# Patient Record
Sex: FEMALE | Race: WHITE | Employment: UNEMPLOYED
[De-identification: names, ages, dates, MRNs, and addresses within clinical notes are randomized per-mention and may not be internally consistent; named-entity substitution may affect disease eponyms.]

---
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
## 2022-01-10 ENCOUNTER — OFFICE VISIT (OUTPATIENT)
Dept: ORTHOPEDIC SURGERY | Age: 22
End: 2022-01-10

## 2022-01-10 VITALS — HEIGHT: 62 IN | BODY MASS INDEX: 23 KG/M2 | WEIGHT: 125 LBS | RESPIRATION RATE: 12 BRPM

## 2022-01-10 DIAGNOSIS — S92.901D CLOSED FRACTURE OF RIGHT FOOT WITH ROUTINE HEALING: ICD-10-CM

## 2022-01-10 DIAGNOSIS — S92.301D MULTIPLE CLOSED FRACTURES OF METATARSAL BONE OF RIGHT FOOT WITH ROUTINE HEALING, SUBSEQUENT ENCOUNTER: Primary | ICD-10-CM

## 2022-01-10 DIAGNOSIS — S92.902D CLOSED FRACTURE OF LEFT FOOT WITH ROUTINE HEALING: ICD-10-CM

## 2022-01-10 DIAGNOSIS — Z91.199 NONCOMPLIANCE: ICD-10-CM

## 2022-01-10 PROCEDURE — 99024 POSTOP FOLLOW-UP VISIT: CPT | Performed by: ORTHOPAEDIC SURGERY

## 2022-01-10 NOTE — PROGRESS NOTES
[facility]  [id number] [address] [place] [address] [address] [address]  Dept: [phone]    Ambulatory Orthopedic Postoperative Visit     Preoperative Diagnosis:   1. Left displaced intra-articular calcaneus fracture  2. Right midfoot/Lisfranc injury (fracture of the base of the second through fourth metatarsals, avulsion fracture of the medial cuneiform, bone contusions of the first metatarsal base and medial cuneiform)  3. Body mass index is 22.86 kg/m².     Postoperative Diagnosis:   1. Same as above     Procedures Performed:  (12/16/2021)  1. Left calcaneus fracture open reduction internal fixation, using small incision technique  2. Right foot open treatment of tarsometatarsal dislocation with internal fixation (first and second TMT joint)  3. Right foot open treatment of metatarsal fractures (second metatarsal fracture)  4. Right foot open treatment of tarsal dislocation (intercuneiform joint)  5. Right foot stress x-rays under fluoroscopy      SUBJECTIVE:     The patient returns post op from the above stated procedure. Reports doing well overall, reports improved pain, denies wound drainage/issues, fevers/chills/night sweats, calf swelling/pain, chest pain, shortness of breath.           OBJECTIVE:  Resp 12   Ht 5' 2\" (1.575 m)   Wt 125 lb (56.7 kg)   LMP  (LMP Unknown)   BMI 22.86 kg/m²    NAD, resting comfortably  RLE:   Incisions clean/dry/intact, no erythema/dehiscence/drainage  Sensation to light touch grossly intact throughout  Warm and well perfused  Grossly neurovascularly intact distally  No signs of infection  No calf swelling/tenderness      LLE:   Incisions clean/dry/intact, no erythema/dehiscence/drainage  Sensation to light touch grossly intact throughout  Warm and well perfused  Grossly neurovascularly intact distally  No signs of infection  No calf swelling/tenderness      RADIOLOGY: 1/10/2022 No new radiology images today. Prior images reviewed for reference. ASSESSMENT AND PLAN:     4 weeks s/p above, doing well overall. Her postoperative course has been complicated by not adhering to the recommended DVT prophylaxis regimen. She has a history of a left displaced intra-articular calcaneus fracture, and right midfoot injury with findings concerning for a Lisfranc injury (fractures at the base of the second through fourth metatarsals, avulsion fracture of the medial cuneiform, bony contusions of the first metatarsal base and medial cuneiform), sustained on 12/9/2021.      Notably, she has a complex past medical history.  She has a history of acute psychosis and a jump from a height. [x]  Sutures/staples were removed and steri-strips applied          Precautions:  nonweight bearing x 10 weeks anticipated on the Bilateral lower extremity             -             [x]? Physical Therapy/Home exercises    --nonweightbearing plus range of motion, we discussed that she will remove her cam boots 2-3 times per day for gentle range of motion     Immobilization:      []? Splint/Cast               [x]? CAM boot    --we discussed that she will sleep in the boots bilaterally                []?  Comfortable shoe    []? Other:                DVT ppx:   [x]? Early mobilization              [x]? Medication as prescribed (Eliquis)        --6 weeks of Eliquis 2.5 mg p.o. q. twice daily ordered today; we again discussed the risks of DVT/pulmonary embolus, including the fact that this can be fatal, and understanding was expressed by the patient (2 family members present as well)             []?  No chemical ppx needed; mechanical only             -     Pain control:  Medication as prescribed (dosing and quantity) indicated for acute postoperative pain control             -     Special concerns:       []?            [x]?   Avoid strenuous activity/pain provoking maneuvers and high-impact repetitive exercises      All questions were answered and the patient agrees with the above plan. The patient will return to clinic in 2 weeks with left calcaneus x-rays and right foot x-rays, simulated weightbearing bilaterally         No follow-ups on file. No orders of the defined types were placed in this encounter. No orders of the defined types were placed in this encounter. Danny Cleary MD  Orthopedic Surgery        Please excuse any typos/errors, as this note was created with the assistance of voice recognition software. While intending to generate a document that actually reflects the content of the visit, the document can still have some errors including those of syntax and sound-a-like substitutions which may escape proof reading. In such instances, actual meaning can be extrapolated by context.

## 2022-01-26 DIAGNOSIS — S92.902D CLOSED FRACTURE OF LEFT FOOT WITH ROUTINE HEALING: ICD-10-CM

## 2022-01-26 DIAGNOSIS — S92.301D MULTIPLE CLOSED FRACTURES OF METATARSAL BONE OF RIGHT FOOT WITH ROUTINE HEALING, SUBSEQUENT ENCOUNTER: Primary | ICD-10-CM

## 2022-02-07 ENCOUNTER — OFFICE VISIT (OUTPATIENT)
Dept: ORTHOPEDIC SURGERY | Age: 22
End: 2022-02-07

## 2022-02-07 VITALS — WEIGHT: 125 LBS | BODY MASS INDEX: 23 KG/M2 | HEIGHT: 62 IN | RESPIRATION RATE: 12 BRPM

## 2022-02-07 DIAGNOSIS — S92.301D MULTIPLE CLOSED FRACTURES OF METATARSAL BONE OF RIGHT FOOT WITH ROUTINE HEALING, SUBSEQUENT ENCOUNTER: ICD-10-CM

## 2022-02-07 DIAGNOSIS — S92.301D MULTIPLE CLOSED FRACTURES OF METATARSAL BONE OF RIGHT FOOT WITH ROUTINE HEALING, SUBSEQUENT ENCOUNTER: Primary | ICD-10-CM

## 2022-02-07 DIAGNOSIS — S92.902D CLOSED FRACTURE OF LEFT FOOT WITH ROUTINE HEALING: Primary | ICD-10-CM

## 2022-02-07 DIAGNOSIS — S92.901D CLOSED FRACTURE OF RIGHT FOOT WITH ROUTINE HEALING: ICD-10-CM

## 2022-02-07 PROCEDURE — 99024 POSTOP FOLLOW-UP VISIT: CPT | Performed by: ORTHOPAEDIC SURGERY

## 2022-02-07 NOTE — PROGRESS NOTES
tenderness      RADIOLOGY:   2/7/2022 FINDINGS:  Three views (AP, Oblique, Lateral) of the right foot and two views (Axial and Lateral) of the left calcaneus were obtained in the office today and reviewed, revealing well-seated intact hardware across the left calcaneus fracture, and well-seated intact hardware across the right midfoot. Interval healing without interval displacement. Limited studies. IMPRESSION: Status post left calcaneus fracture ORIF and right midfoot ORIF. Electronically signed by Andre Foster MD      ASSESSMENT AND PLAN:     7.5 weeks s/p above, doing well overall. Her postoperative course has been complicated by not adhering to the recommended DVT prophylaxis regimen. She has a history of a left displaced intra-articular calcaneus fracture, and right midfoot injury with findings concerning for a Lisfranc injury (fractures at the base of the second through fourth metatarsals, avulsion fracture of the medial cuneiform, bony contusions of the first metatarsal base and medial cuneiform), sustained on 12/9/2021.      Notably, she has a complex past medical history.  She has a history of acute psychosis and a jump from a height. Precautions:  nonweight bearing x 10 weeks anticipated on the Bilateral lower extremity             -We will advance the patient's weightbearing status on 2/24/2022, and the importance of strictly adhering to the recommendations was highlighted. The patient will begin weightbearing at 50% partial weightbearing for 2 weeks, progress to 75% partial weightbearing for 2 weeks, then progress to weightbearing as tolerated for 2 weeks. Weightbearing will only be performed while protected in the CAM boot. An educational handout was also provided explaining/reinforcing these details. [x]? Physical Therapy/Home exercises         Immobilization:      []? Splint/Cast               [x]?   CAM boot                 []?  Comfortable shoe []?  Other:                DVT ppx:   [x]? Early mobilization              [x]? Medication as prescribed (Eliquis)        --5 more weeks of Eliquis 2.5 mg p.o. q. twice daily ordered today; we have discussed the risks of DVT/pulmonary embolus     []? No chemical ppx needed; mechanical only             -     Pain control:  Medication as prescribed (dosing and quantity) indicated for acute postoperative pain control             -     Special concerns:       []?            [x]? Avoid strenuous activity/pain provoking maneuvers and high-impact repetitive exercises      All questions were answered and the patient agrees with the above plan. The patient will return to clinic in 4.5 weeks with repeat right foot and left calcaneus x-rays, simulated weightbearing         No follow-ups on file. No orders of the defined types were placed in this encounter. No orders of the defined types were placed in this encounter. Manda Theodore MD  Orthopedic Surgery        Please excuse any typos/errors, as this note was created with the assistance of voice recognition software. While intending to generate a document that actually reflects the content of the visit, the document can still have some errors including those of syntax and sound-a-like substitutions which may escape proof reading. In such instances, actual meaning can be extrapolated by context.

## 2022-03-10 DIAGNOSIS — S92.902D CLOSED FRACTURE OF LEFT FOOT WITH ROUTINE HEALING: ICD-10-CM

## 2022-03-10 DIAGNOSIS — S92.301D MULTIPLE CLOSED FRACTURES OF METATARSAL BONE OF RIGHT FOOT WITH ROUTINE HEALING, SUBSEQUENT ENCOUNTER: Primary | ICD-10-CM

## 2022-03-14 ENCOUNTER — OFFICE VISIT (OUTPATIENT)
Dept: ORTHOPEDIC SURGERY | Age: 22
End: 2022-03-14

## 2022-03-14 VITALS — BODY MASS INDEX: 23 KG/M2 | HEIGHT: 62 IN | WEIGHT: 125 LBS

## 2022-03-14 DIAGNOSIS — Z91.199 NONCOMPLIANCE: ICD-10-CM

## 2022-03-14 DIAGNOSIS — S92.301D MULTIPLE CLOSED FRACTURES OF METATARSAL BONE OF RIGHT FOOT WITH ROUTINE HEALING, SUBSEQUENT ENCOUNTER: Primary | ICD-10-CM

## 2022-03-14 PROCEDURE — 99024 POSTOP FOLLOW-UP VISIT: CPT | Performed by: ORTHOPAEDIC SURGERY

## 2022-03-14 NOTE — PROGRESS NOTES
[place] [address]  Dept: [phone]    Ambulatory Orthopedic Postoperative Visit     Preoperative Diagnosis:   1. Left displaced intra-articular calcaneus fracture  2. Right midfoot/Lisfranc injury (fracture of the base of the second through fourth metatarsals, avulsion fracture of the medial cuneiform, bone contusions of the first metatarsal base and medial cuneiform)  3. Body mass index is 22.86 kg/m².     Postoperative Diagnosis:   1. Same as above     Procedures Performed:  (12/16/2021)  1. Left calcaneus fracture open reduction internal fixation, using small incision technique  2. Right foot open treatment of tarsometatarsal dislocation with internal fixation (first and second TMT joint)  3. Right foot open treatment of metatarsal fractures (second metatarsal fracture)  4. Right foot open treatment of tarsal dislocation (intercuneiform joint)  5. Right foot stress x-rays under fluoroscopy      SUBJECTIVE:     The patient returns post op from the above stated procedure. Reports doing well overall, reports improved pain, denies wound drainage/issues, fevers/chills/night sweats, calf swelling/pain, chest pain, shortness of breath. Notably, a member of the office staff is present for the entire patient interaction, Rupert Castellani, [place]. She is here today with 2 family members, and they report that she has been fully weightbearing without her boots.        OBJECTIVE:  Ht 5' 2\" (1.575 m)   Wt 125 lb (56.7 kg)   HC 16 cm (6.3\")   BMI 22.86 kg/m²    NAD, resting comfortably  RLE:   Incisions clean/dry/intact, no erythema/dehiscence/drainage  Sensation to light touch grossly intact throughout  Warm and well perfused  Grossly neurovascularly intact distally  No signs of infection  No calf swelling/tenderness  No significant periincisional tenderness      LLE:   Incisions clean/dry/intact, no erythema/dehiscence/drainage  Sensation to light touch grossly intact throughout  Warm and well perfused  Grossly neurovascularly intact distally  No signs of infection  No calf swelling/tenderness  No significant periincisional tenderness  -Good subtalar joint range of motion, painless  -Prominence of the posterior aspect of the left heel, possibly related to hardware, minimal tenderness      RADIOLOGY:   3/14/2022 FINDINGS:  Three views (AP, Oblique, Lateral) of the right foot and two views (Axial and Lateral) of the left calcaneus were obtained in the office today and reviewed, revealing well-seated intact hardware across the left calcaneus fracture, and well-seated intact hardware across the right midfoot. Interval healing without interval displacement. Limited studies. IMPRESSION: Status post left calcaneus fracture ORIF and right midfoot ORIF. Electronically signed by Anna Galan MD      ASSESSMENT AND PLAN:     12 weeks s/p above, doing well overall. Her postoperative course has been complicated by not adhering to the recommended DVT prophylaxis regimen, as well as not strictly adhering to the weightbearing precaution. The patient has not been adhering to the recommended weightbearing restriction, and we have discussed this, as well as the importance of this. I have encouraged the patient to closely follow the recommendations. We discussed the risks of arthritis, and future surgery. She has a history of a left displaced intra-articular calcaneus fracture, and right midfoot injury with findings concerning for a Lisfranc injury (fractures at the base of the second through fourth metatarsals, avulsion fracture of the medial cuneiform, bony contusions of the first metatarsal base and medial cuneiform), sustained on 12/9/2021.      Notably, she has a complex past medical history.  She has a history of acute psychosis and a jump from a height.             Precautions:     -Advancing the patient's weightbearing status (begin/began on 2/24/2022), and the importance of strictly adhering to the recommendations has been highlighted. The patient will begin weightbearing at 50% partial weightbearing for 2 weeks, progress to 75% partial weightbearing for 2 weeks, then progress to weightbearing as tolerated for 2 weeks if comfortable. Weightbearing will only be performed while protected in the CAM boot. After this 6-week progression has been performed, the patient may then wean out of the cam boot over the next 2 weeks as tolerated, unless instructed otherwise. Another educational handout was also provided explaining/reinforcing these details. [x]? Physical Therapy/Home exercises         Immobilization:      []? Splint/Cast               [x]? CAM boot                 []?  Comfortable shoe    []? Other:                DVT ppx:   [x]? Early mobilization              []?  Medication as prescribed (Eliquis)           [x]? No chemical ppx needed; mechanical only             -     Pain control:  Medication as prescribed (dosing and quantity) indicated for acute postoperative pain control             -     Special concerns:       []?            [x]? Avoid strenuous activity/pain provoking maneuvers and high-impact repetitive exercises      All questions were answered and the patient agrees with the above plan. The patient will return to clinic in 3 months with repeat right foot and left calcaneus x-rays         No follow-ups on file. No orders of the defined types were placed in this encounter. No orders of the defined types were placed in this encounter. Juvenal Garcia MD  Orthopedic Surgery        Please excuse any typos/errors, as this note was created with the assistance of voice recognition software.  While intending to generate a document that actually reflects the content of the visit, the document can still have some errors including those of syntax and sound-a-like substitutions which may escape proof reading. In such instances, actual meaning can be extrapolated by context.

## 2022-03-29 ENCOUNTER — NURSE TRIAGE (OUTPATIENT)
Dept: OTHER | Facility: CLINIC | Age: 22
End: 2022-03-29

## 2022-03-29 ENCOUNTER — APPOINTMENT (OUTPATIENT)
Dept: GENERAL RADIOLOGY | Age: 22
End: 2022-03-29
Payer: COMMERCIAL

## 2022-03-29 ENCOUNTER — HOSPITAL ENCOUNTER (EMERGENCY)
Age: 22
Discharge: HOME | End: 2022-03-29
Attending: EMERGENCY MEDICINE
Payer: COMMERCIAL

## 2022-03-29 VITALS
OXYGEN SATURATION: 95 % | WEIGHT: 120 LBS | RESPIRATION RATE: 18 BRPM | BODY MASS INDEX: 22.08 KG/M2 | DIASTOLIC BLOOD PRESSURE: 66 MMHG | TEMPERATURE: 99.7 F | HEIGHT: 62 IN | SYSTOLIC BLOOD PRESSURE: 126 MMHG | HEART RATE: 112 BPM

## 2022-03-29 DIAGNOSIS — R11.2 NAUSEA AND VOMITING, INTRACTABILITY OF VOMITING NOT SPECIFIED, UNSPECIFIED VOMITING TYPE: Primary | ICD-10-CM

## 2022-03-29 LAB
ABSOLUTE EOS #: 0 K/UL (ref 0–0.4)
ABSOLUTE LYMPH #: 2.5 K/UL (ref 1.2–5.2)
ABSOLUTE MONO #: 0.9 K/UL (ref 0.1–1.3)
ALBUMIN SERPL-MCNC: 5.6 G/DL (ref 3.5–5.2)
ALP BLD-CCNC: 83 U/L (ref 35–104)
ALT SERPL-CCNC: 8 U/L (ref 5–33)
ANION GAP SERPL CALCULATED.3IONS-SCNC: 17 MMOL/L (ref 9–17)
AST SERPL-CCNC: 11 U/L
BACTERIA: NORMAL
BASOPHILS # BLD: 1 % (ref 0–2)
BASOPHILS ABSOLUTE: 0.1 K/UL (ref 0–0.2)
BILIRUB SERPL-MCNC: 0.67 MG/DL (ref 0.3–1.2)
BILIRUBIN URINE: NEGATIVE
BUN BLDV-MCNC: 14 MG/DL (ref 6–20)
CALCIUM SERPL-MCNC: 10.1 MG/DL (ref 8.6–10.4)
CASTS UA: NORMAL /LPF
CHLORIDE BLD-SCNC: 96 MMOL/L (ref 98–107)
CO2: 27 MMOL/L (ref 20–31)
COLOR: YELLOW
CREAT SERPL-MCNC: 0.59 MG/DL (ref 0.5–0.9)
D-DIMER QUANTITATIVE: 0.42 MG/L FEU (ref 0–0.59)
EOSINOPHILS RELATIVE PERCENT: 0 % (ref 0–4)
EPITHELIAL CELLS UA: NORMAL /HPF
GFR AFRICAN AMERICAN: >60 ML/MIN
GFR NON-AFRICAN AMERICAN: >60 ML/MIN
GFR SERPL CREATININE-BSD FRML MDRD: ABNORMAL ML/MIN/{1.73_M2}
GLUCOSE BLD-MCNC: 98 MG/DL (ref 70–99)
GLUCOSE URINE: NEGATIVE
HCG QUALITATIVE: NEGATIVE
HCT VFR BLD CALC: 42 % (ref 36–46)
HEMOGLOBIN: 14.3 G/DL (ref 12–16)
KETONES, URINE: ABNORMAL
LACTIC ACID: 1.1 MMOL/L (ref 0.5–2.2)
LEUKOCYTE ESTERASE, URINE: ABNORMAL
LIPASE: 34 U/L (ref 13–60)
LYMPHOCYTES # BLD: 22 % (ref 25–45)
MAGNESIUM: 2.3 MG/DL (ref 1.6–2.6)
MCH RBC QN AUTO: 29 PG (ref 26–34)
MCHC RBC AUTO-ENTMCNC: 34.1 G/DL (ref 31–37)
MCV RBC AUTO: 85 FL (ref 80–100)
MONOCYTES # BLD: 8 % (ref 2–8)
NITRITE, URINE: NEGATIVE
PDW BLD-RTO: 14 % (ref 11.5–14.9)
PH UA: 6 (ref 5–8)
PLATELET # BLD: 348 K/UL (ref 150–450)
PMV BLD AUTO: 7.7 FL (ref 6–12)
POTASSIUM SERPL-SCNC: 3.5 MMOL/L (ref 3.7–5.3)
PROTEIN UA: ABNORMAL
RBC # BLD: 4.94 M/UL (ref 4–5.2)
RBC UA: NORMAL /HPF
SEG NEUTROPHILS: 69 % (ref 34–64)
SEGMENTED NEUTROPHILS ABSOLUTE COUNT: 7.7 K/UL (ref 1.3–9.1)
SODIUM BLD-SCNC: 140 MMOL/L (ref 135–144)
SPECIFIC GRAVITY UA: 1.03 (ref 1–1.03)
TOTAL PROTEIN: 9.1 G/DL (ref 6.4–8.3)
TURBIDITY: CLEAR
URINE HGB: NEGATIVE
UROBILINOGEN, URINE: NORMAL
WBC # BLD: 11.1 K/UL (ref 4.5–13.5)
WBC UA: NORMAL /HPF

## 2022-03-29 PROCEDURE — 84703 CHORIONIC GONADOTROPIN ASSAY: CPT

## 2022-03-29 PROCEDURE — 83605 ASSAY OF LACTIC ACID: CPT

## 2022-03-29 PROCEDURE — 96374 THER/PROPH/DIAG INJ IV PUSH: CPT

## 2022-03-29 PROCEDURE — 36415 COLL VENOUS BLD VENIPUNCTURE: CPT

## 2022-03-29 PROCEDURE — 93005 ELECTROCARDIOGRAM TRACING: CPT | Performed by: STUDENT IN AN ORGANIZED HEALTH CARE EDUCATION/TRAINING PROGRAM

## 2022-03-29 PROCEDURE — 83735 ASSAY OF MAGNESIUM: CPT

## 2022-03-29 PROCEDURE — 83690 ASSAY OF LIPASE: CPT

## 2022-03-29 PROCEDURE — 85379 FIBRIN DEGRADATION QUANT: CPT

## 2022-03-29 PROCEDURE — 80053 COMPREHEN METABOLIC PANEL: CPT

## 2022-03-29 PROCEDURE — 81001 URINALYSIS AUTO W/SCOPE: CPT

## 2022-03-29 PROCEDURE — 71045 X-RAY EXAM CHEST 1 VIEW: CPT

## 2022-03-29 PROCEDURE — 2580000003 HC RX 258: Performed by: STUDENT IN AN ORGANIZED HEALTH CARE EDUCATION/TRAINING PROGRAM

## 2022-03-29 PROCEDURE — 85025 COMPLETE CBC W/AUTO DIFF WBC: CPT

## 2022-03-29 PROCEDURE — 6360000002 HC RX W HCPCS: Performed by: STUDENT IN AN ORGANIZED HEALTH CARE EDUCATION/TRAINING PROGRAM

## 2022-03-29 PROCEDURE — 99284 EMERGENCY DEPT VISIT MOD MDM: CPT

## 2022-03-29 RX ORDER — 0.9 % SODIUM CHLORIDE 0.9 %
1000 INTRAVENOUS SOLUTION INTRAVENOUS ONCE
Status: COMPLETED | OUTPATIENT
Start: 2022-03-29 | End: 2022-03-29

## 2022-03-29 RX ORDER — ONDANSETRON 4 MG/1
4 TABLET, ORALLY DISINTEGRATING ORAL EVERY 8 HOURS PRN
Qty: 10 TABLET | Refills: 0 | Status: SHIPPED | OUTPATIENT
Start: 2022-03-29 | End: 2022-04-08

## 2022-03-29 RX ORDER — ONDANSETRON 2 MG/ML
4 INJECTION INTRAMUSCULAR; INTRAVENOUS ONCE
Status: COMPLETED | OUTPATIENT
Start: 2022-03-29 | End: 2022-03-29

## 2022-03-29 RX ADMIN — SODIUM CHLORIDE 1000 ML: 9 INJECTION, SOLUTION INTRAVENOUS at 16:28

## 2022-03-29 RX ADMIN — ONDANSETRON 4 MG: 2 INJECTION INTRAMUSCULAR; INTRAVENOUS at 16:28

## 2022-03-29 ASSESSMENT — ENCOUNTER SYMPTOMS
NAUSEA: 1
SHORTNESS OF BREATH: 0
TROUBLE SWALLOWING: 0
PHOTOPHOBIA: 0
DIARRHEA: 0
SINUS PAIN: 0
CONSTIPATION: 0
EYE REDNESS: 0
VOMITING: 1
COLOR CHANGE: 0
COUGH: 0
BACK PAIN: 0
ABDOMINAL PAIN: 1
CHEST TIGHTNESS: 0
SINUS PRESSURE: 0
SORE THROAT: 0

## 2022-03-29 ASSESSMENT — PAIN SCALES - GENERAL: PAINLEVEL_OUTOF10: 8

## 2022-03-29 ASSESSMENT — PAIN DESCRIPTION - PAIN TYPE: TYPE: ACUTE PAIN

## 2022-03-29 ASSESSMENT — PAIN - FUNCTIONAL ASSESSMENT: PAIN_FUNCTIONAL_ASSESSMENT: 0-10

## 2022-03-29 ASSESSMENT — PAIN DESCRIPTION - LOCATION: LOCATION: ABDOMEN

## 2022-03-29 NOTE — ED PROVIDER NOTES
[facility] ED  Emergency Department Encounter  Emergency Medicine Resident     Pt Name: Abby Valladares  MRN: [id number]  [place] 2000  Date of evaluation: 3/29/22  PCP:  LYNNE Culver CNP    CHIEF COMPLAINT       Chief Complaint   Patient presents with    Abdominal Pain    Emesis       HISTORY OFPRESENT ILLNESS  (Location/Symptom, Timing/Onset, Context/Setting, Quality, Duration, Modifying Factors,Severity.)      Abby Valladares is a 23 y. o.yo female who presents with complaints of nausea and vomiting. Patient has very flat affect a difficult to obtain history from. She does state that for 2 weeks she has been throwing up and has mild abdominal pain. She states that she constantly feels nauseated nothing makes it better or worse. Patient was seen at urgent care earlier today states they were unable to do anything for her. Patient denies any concerns for pregnancy but is unsure if she is on birth control or not. Patient also states she has chest discomfort, denies any shortness of breath or difficulty breathing. Patient denies any urinary symptoms or vaginal discharge. Of note patient has bilateral walking boots on, states that she broke both of her feet in December    PAST MEDICAL / SURGICAL / SOCIAL / FAMILY HISTORY      has a past medical history of ADHD (attention deficit hyperactivity disorder), Anxiety, and Depression. has a past surgical history that includes Adenoidectomy; Tonsillectomy (2003); and Foot fracture surgery (Bilateral, 12/16/2021). Social History     Socioeconomic History    Marital status: Single     Spouse name: Not on file    Number of children: Not on file    Years of education: Not on file    Highest education level: Not on file   Occupational History    Not on file   Tobacco Use    Smoking status: Former Smoker    Smokeless tobacco: Never Used   Vaping Use    Vaping Use: Never used   Substance and Sexual Activity    Alcohol use:  Yes 2.5 MG TABS tablet Take 1 tablet by mouth 2 times daily 1/10/22 2/21/22  Estephania Roach MD   risperiDONE (RISPERDAL) 0.5 MG tablet Take 5 tablets by mouth 2 times daily 12/22/21   Momo Johnson MD   apixaban (ELIQUIS) 2.5 MG TABS tablet Take 1 tablet by mouth 2 times daily 12/15/21   Robson Alvarenga MD   ibuprofen (ADVIL;MOTRIN) 400 MG tablet Take 1 tablet by mouth every 6 hours as needed (Pain moderate (4-7), Pain severe (8-10)) 12/15/21   Robson Alvarenga MD   traZODone (DESYREL) 50 MG tablet Take 1 tablet by mouth nightly as needed for Sleep 12/15/21   Robson Alvarenga MD       REVIEW OFSYSTEMS    (2-9 systems for level 4, 10 or more for level 5)      Review of Systems   Constitutional: Negative for chills, diaphoresis, fatigue and fever. HENT: Negative for congestion, sinus pressure, sinus pain, sore throat and trouble swallowing. Eyes: Negative for photophobia, redness and visual disturbance. Respiratory: Negative for cough, chest tightness and shortness of breath. Cardiovascular: Positive for chest pain. Negative for palpitations and leg swelling. Gastrointestinal: Positive for abdominal pain, nausea and vomiting. Negative for constipation and diarrhea. Genitourinary: Negative for difficulty urinating, dysuria, flank pain, urgency, vaginal bleeding, vaginal discharge and vaginal pain. Musculoskeletal: Positive for gait problem. Negative for back pain, neck pain and neck stiffness. Skin: Negative for color change, pallor and rash. Neurological: Negative for dizziness, tremors, speech difficulty, weakness, light-headedness and headaches. PHYSICAL EXAM   (up to 7 for level 4, 8 or more forlevel 5)      INITIAL VITALS:   ED Triage Vitals [03/29/22 1506]   BP Temp Temp Source Pulse Resp SpO2 Height Weight   126/66 99.7 °F (37.6 °C) Oral 112 18 95 % 5' 2\" (1.575 m) 120 lb (54.4 kg)       Physical Exam  Constitutional:       General: She is not in acute distress. Appearance: She is not ill-appearing. HENT:      Head: Normocephalic and atraumatic. Right Ear: External ear normal.      Left Ear: External ear normal.      Nose: Nose normal. No rhinorrhea. Eyes:      Extraocular Movements: Extraocular movements intact. Pupils: Pupils are equal, round, and reactive to light. Cardiovascular:      Rate and Rhythm: Regular rhythm. Tachycardia present. Pulses: Normal pulses. Heart sounds: No murmur heard. Pulmonary:      Effort: Pulmonary effort is normal.      Breath sounds: Normal breath sounds. Abdominal:      General: Abdomen is flat. Bowel sounds are normal.      Palpations: Abdomen is soft. There is no shifting dullness or fluid wave. Tenderness: There is generalized abdominal tenderness. There is no guarding or rebound. Negative signs include Carmichael's sign. Musculoskeletal:         General: No swelling. Cervical back: Normal range of motion and neck supple. Comments: Bilateral walking boot in place    Skin:     General: Skin is warm and dry. Neurological:      General: No focal deficit present. Mental Status: She is alert and oriented to person, place, and time. Psychiatric:         Mood and Affect: Affect is flat. Behavior: Behavior is withdrawn.          DIFFERENTIAL  DIAGNOSIS     PLAN (LABS / IMAGING / EKG):  Orders Placed This Encounter   Procedures    XR CHEST PORTABLE    CBC with Auto Differential    Comprehensive Metabolic Panel w/ Reflex to MG    Lipase    Lactic Acid    HCG Qualitative, Serum    Urinalysis with Reflex to Culture    D-Dimer, Quantitative    Magnesium    Microscopic Urinalysis    EKG 12 Lead       MEDICATIONS ORDERED:  Orders Placed This Encounter   Medications    0.9 % sodium chloride bolus    ondansetron (ZOFRAN) injection 4 mg    ondansetron (ZOFRAN ODT) 4 MG disintegrating tablet     Sig: Place 1 tablet under the tongue every 8 hours as needed for Nausea     Dispense: 10 tablet     Refill:  0       DDX: Gastroenteritis, cyclic vomiting, urinary tract infection, pregnancy, psychosomatic, low concern for a PE but will rule out with a D-dimer given bilateral ankle fracture, GERD    Initial MDM/Plan: 23 y.o. female who presents with complaints of nausea and vomiting. Patient has extremely flat affect on exam, difficult to obtain history from. Aside from patient's mood physical exam is unremarkable. Abdomen is soft, subjective tenderness patient does not grimace on deep palpation. We will plan for lab work as well as chest x-ray EKG and a D-dimer. DIAGNOSTIC RESULTS / EMERGENCYDEPARTMENT COURSE / MDM     LABS:  Labs Reviewed   CBC WITH AUTO DIFFERENTIAL - Abnormal; Notable for the following components:       Result Value    Seg Neutrophils 69 (*)     Lymphocytes 22 (*)     All other components within normal limits   COMPREHENSIVE METABOLIC PANEL W/ REFLEX TO MG FOR LOW K - Abnormal; Notable for the following components:    Potassium 3.5 (*)     Chloride 96 (*)     Total Protein 9.1 (*)     Albumin 5.6 (*)     All other components within normal limits   URINALYSIS WITH REFLEX TO CULTURE - Abnormal; Notable for the following components:    Ketones, Urine 4+ (*)     Protein, UA 1+ (*)     Leukocyte Esterase, Urine TRACE (*)     All other components within normal limits   LIPASE   LACTIC ACID   HCG, SERUM, QUALITATIVE   D-DIMER, QUANTITATIVE   MAGNESIUM   MICROSCOPIC URINALYSIS         RADIOLOGY:  XR CHEST PORTABLE    Result Date: 3/29/2022  EXAMINATION: ONE XRAY VIEW OF THE CHEST 3/29/2022 4:03 pm COMPARISON: 12/09/2021 HISTORY: Reason for Exam: chest pain FINDINGS: The lungs are without acute focal process. No effusion or pneumothorax. The cardiomediastinal silhouette is normal.  The osseous structures are intact without acute process. Negative chest.         EKG  Normal sinus rhythm, ventricular rate 79 bpm, .   Normal EKG    All EKG's are interpreted by the Emergency Department Physicianwho either signs or Co-signs this chart in the absence of a cardiologist.    EMERGENCY DEPARTMENT COURSE:  ED Course as of 03/29/22 1837   Tue Mar 29, 2022   1626 EKG unremarkable  [CD]   1640 CXR wnl  [CD]   1704 D-Dimer, Quant: 0.42 [CD]   1704 Labs all wnl including d-dimer and LA [CD]   6663 Awaiting UA  [CD]   1817 Per nursing staff urinalysis was obtained and sent to lab. [CD]      ED Course User Index  [CD] Andrew Aguiar DO          PROCEDURES:  None    CONSULTS:  None    CRITICAL CARE:  Please see attending documentation    FINAL IMPRESSION      1.  Nausea and vomiting, intractability of vomiting not specified, unspecified vomiting type          DISPOSITION / PLAN     DISPOSITION    Discharge home    PATIENT REFERRED TO:  LYNNE Baldwin - CNP  [address] [place] [address]  [phone]    Call in 2 days  For ED follow up    [facility] ED  [place] [address]  [phone]  Go to   If symptoms worsen      DISCHARGE MEDICATIONS:  New Prescriptions    ONDANSETRON (ZOFRAN ODT) 4 MG DISINTEGRATING TABLET    Place 1 tablet under the tongue every 8 hours as needed for Nausea       Andrew Aguiar DO  Emergency Medicine Resident    (Please note that portions of this note were completed with a voice recognition program.Efforts were made to edit the dictations but occasionally words are mis-transcribed.)        Andrew Aguiar DO  Resident  03/29/22 4825

## 2022-03-29 NOTE — ED PROVIDER NOTES
[address] ED  eMERGENCY dEPARTMENT eNCOUnter   Attending Attestation     Pt Name: Naif Lim  MRN: [id number]  [place] 2000  Date of evaluation: 3/29/22    History, EXAM, MDM:    Naif Lim is a 23 y.o. female who presents with Abdominal Pain and Emesis    Pt presenting with c/o n/v about 10x per day. Symptoms for a month. Also having cp. She is in bilateral walking boots from a left calcaneous open reduction internal fixation and right foot fx open reduction internal fixation in December of 2021. EKG shows a sinus rhythm. HR is 70, , QRS 82, , no MIMI, No STD, No TWI, the axis is normal.  cxr shows no acute abnormalities. UA does not appear infected. D.dimer is not elevated, doubt PE. No renal failure. Mild hypokalemia at 3.5. No lipase elevation. No lactic acid elevation. Pt provided symptomatic treatment. Recommended close follow up with her pcp, return to ED if symptoms worsen, and warning precautions provided. Vitals:   Vitals:    03/29/22 1506   BP: 126/66   Pulse: 112   Resp: 18   Temp: 99.7 °F (37.6 °C)   TempSrc: Oral   SpO2: 95%   Weight: 120 lb (54.4 kg)   Height: 5' 2\" (1.575 m)     I performed a history and physical examination of the patient and discussed management with the resident. I reviewed the residents note and agree with the documented findings and plan of care. Any areas of disagreement are noted on the chart. I was personally present for the key portions of any procedures. I have documented in the chart those procedures where I was not present during the key portions. I have personally reviewed all images and agree with the resident's interpretation. I have reviewed the emergency nurses triage note. I agree with the chief complaint, past medical history, past surgical history, allergies, medications, social and family history as documented unless otherwise noted below.  Documentation of the HPI, Physical Exam and Medical Decision Making performed by medical students or scribes is based on my personal performance of the HPI, PE and MDM. For Phys Assistant/ Nurse Practitioner cases/documentation I have had a face to face evaluation of this patient and have completed at least one if not all key elements of the E/M (history, physical exam, and MDM). Additional findings are as noted.     Yessenia Victor MD  Attending Emergency  Physician                Yessenia Victor MD  03/29/22 1806

## 2022-03-29 NOTE — TELEPHONE ENCOUNTER
Received call from Cherie Sena at [facility] with nap- [employer]. Subjective: Caller states \"I have been throwing up non stop for 2 weeks. I have stomach pains and my hands go numb\"     Current Symptoms: vomiting, abdominal pain-all over    Patient was slurring her words and repeating herself. Onset: 2 week ago;    Associated Symptoms: NA    Pain Severity: 9/10; sharp; waxing and waning    Temperature: \"a little bit yeah\"    What has been tried: NA    LMP: 3 months ago Pregnant: No    Recommended disposition: Go to ED Now    Care advice provided, patient verbalizes understanding; denies any other questions or concerns; instructed to call back for any new or worsening symptoms. Patient/caller agrees to proceed to . Emergency Department     Attention Provider: Thank you for allowing me to participate in the care of your patient. The patient was connected to triage in response to information provided to the ECC/PSC. Please do not respond through this encounter as the response is not directed to a shared pool.       Reason for Disposition   Vomiting red blood or black (coffee ground) material    Protocols used: VOMITING-ADULT-[place]

## 2022-03-29 NOTE — ED TRIAGE NOTES
Mode of arrival (squad #, walk in, police, etc) : walk in        Chief complaint(s):abd pain with N/V        Arrival Note (brief scenario, treatment PTA, etc). : onset 1 month, sent by Dr for eval. Noting makes better/worse, pt denies any medication changes. Pt does have walking boots on both feet due to bilat broken feet. C= \"Have you ever felt that you should Cut down on your drinking? \"  no  A= \"Have people Annoyed you by criticizing your drinking? \" no  G= \"Have you ever felt bad or Guilty about your drinking? \"  no  E= \"Have you ever had a drink as an Eye-opener first thing in the morning to steady your nerves or to help a hangover? \"  no     Deferred []      Reason for deferring: na    *If yes to two or more: probable alcohol abuse. *

## 2022-03-30 LAB
EKG ATRIAL RATE: 79 BPM
EKG P AXIS: 19 DEGREES
EKG P-R INTERVAL: 126 MS
EKG Q-T INTERVAL: 382 MS
EKG QRS DURATION: 82 MS
EKG QTC CALCULATION (BAZETT): 438 MS
EKG R AXIS: 57 DEGREES
EKG T AXIS: 51 DEGREES
EKG VENTRICULAR RATE: 79 BPM

## 2022-03-30 PROCEDURE — 93010 ELECTROCARDIOGRAM REPORT: CPT | Performed by: INTERNAL MEDICINE

## 2022-04-05 RX ORDER — RISPERIDONE 2 MG/1
2 TABLET, FILM COATED ORAL 2 TIMES DAILY
Status: ON HOLD | COMMUNITY
Start: 2022-03-01 | End: 2022-04-14 | Stop reason: HOSPADM

## 2022-04-05 RX ORDER — HYDROXYZINE HYDROCHLORIDE 25 MG/1
25 TABLET, FILM COATED ORAL EVERY 8 HOURS PRN
Status: ON HOLD | COMMUNITY
Start: 2022-03-03 | End: 2022-04-14 | Stop reason: HOSPADM

## 2022-04-05 RX ORDER — ESCITALOPRAM OXALATE 5 MG/1
1 TABLET ORAL DAILY
COMMUNITY
Start: 2022-03-01 | End: 2022-04-08

## 2022-04-08 ENCOUNTER — OFFICE VISIT (OUTPATIENT)
Dept: INTERNAL MEDICINE CLINIC | Age: 22
End: 2022-04-08
Payer: COMMERCIAL

## 2022-04-08 ENCOUNTER — HOSPITAL ENCOUNTER (INPATIENT)
Age: 22
LOS: 6 days | Discharge: HOME | DRG: 885 | End: 2022-04-14
Attending: STUDENT IN AN ORGANIZED HEALTH CARE EDUCATION/TRAINING PROGRAM | Admitting: PSYCHIATRY & NEUROLOGY
Payer: COMMERCIAL

## 2022-04-08 VITALS
OXYGEN SATURATION: 98 % | SYSTOLIC BLOOD PRESSURE: 100 MMHG | DIASTOLIC BLOOD PRESSURE: 58 MMHG | HEIGHT: 62 IN | HEART RATE: 84 BPM | BODY MASS INDEX: 24.62 KG/M2 | WEIGHT: 133.8 LBS

## 2022-04-08 DIAGNOSIS — R45.851 SUICIDAL IDEATION: Primary | ICD-10-CM

## 2022-04-08 DIAGNOSIS — T14.91XA SUICIDE ATTEMPT (HCC): Primary | ICD-10-CM

## 2022-04-08 DIAGNOSIS — R11.2 NAUSEA AND VOMITING, INTRACTABILITY OF VOMITING NOT SPECIFIED, UNSPECIFIED VOMITING TYPE: ICD-10-CM

## 2022-04-08 DIAGNOSIS — F19.10 POLYSUBSTANCE ABUSE (HCC): ICD-10-CM

## 2022-04-08 DIAGNOSIS — F41.1 GENERALIZED ANXIETY DISORDER: ICD-10-CM

## 2022-04-08 PROBLEM — F33.3 MAJOR DEPRESSIVE DISORDER, RECURRENT, SEVERE WITH PSYCHOTIC FEATURES (HCC): Status: ACTIVE | Noted: 2022-04-08

## 2022-04-08 LAB
ABSOLUTE EOS #: 0.4 K/UL (ref 0–0.4)
ABSOLUTE LYMPH #: 3.1 K/UL (ref 1.2–5.2)
ABSOLUTE MONO #: 0.7 K/UL (ref 0.1–1.3)
ACETAMINOPHEN LEVEL: <5 UG/ML (ref 10–30)
ALBUMIN SERPL-MCNC: 4.6 G/DL (ref 3.5–5.2)
ALP BLD-CCNC: 78 U/L (ref 35–104)
ALT SERPL-CCNC: 9 U/L (ref 5–33)
ANION GAP SERPL CALCULATED.3IONS-SCNC: 12 MMOL/L (ref 9–17)
AST SERPL-CCNC: 11 U/L
BACTERIA: NORMAL
BASOPHILS # BLD: 1 % (ref 0–2)
BASOPHILS ABSOLUTE: 0.1 K/UL (ref 0–0.2)
BILIRUB SERPL-MCNC: 0.19 MG/DL (ref 0.3–1.2)
BILIRUBIN URINE: NEGATIVE
BUN BLDV-MCNC: 6 MG/DL (ref 6–20)
CALCIUM SERPL-MCNC: 9.2 MG/DL (ref 8.6–10.4)
CASTS UA: NORMAL /LPF
CHLORIDE BLD-SCNC: 101 MMOL/L (ref 98–107)
CO2: 25 MMOL/L (ref 20–31)
COLOR: YELLOW
CREAT SERPL-MCNC: 0.51 MG/DL (ref 0.5–0.9)
EOSINOPHILS RELATIVE PERCENT: 4 % (ref 0–4)
EPITHELIAL CELLS UA: NORMAL /HPF
ETHANOL PERCENT: <0.01 %
ETHANOL: <10 MG/DL
GFR AFRICAN AMERICAN: >60 ML/MIN
GFR NON-AFRICAN AMERICAN: >60 ML/MIN
GFR SERPL CREATININE-BSD FRML MDRD: ABNORMAL ML/MIN/{1.73_M2}
GLUCOSE BLD-MCNC: 74 MG/DL (ref 70–99)
GLUCOSE URINE: NEGATIVE
HCG QUALITATIVE: NEGATIVE
HCT VFR BLD CALC: 38 % (ref 36–46)
HEMOGLOBIN: 12.9 G/DL (ref 12–16)
KETONES, URINE: NEGATIVE
LEUKOCYTE ESTERASE, URINE: NEGATIVE
LIPASE: 23 U/L (ref 13–60)
LYMPHOCYTES # BLD: 35 % (ref 25–45)
MCH RBC QN AUTO: 29.4 PG (ref 26–34)
MCHC RBC AUTO-ENTMCNC: 34 G/DL (ref 31–37)
MCV RBC AUTO: 86.4 FL (ref 80–100)
MONOCYTES # BLD: 8 % (ref 2–8)
NITRITE, URINE: NEGATIVE
PDW BLD-RTO: 13.6 % (ref 11.5–14.9)
PH UA: 7 (ref 5–8)
PLATELET # BLD: 297 K/UL (ref 150–450)
PMV BLD AUTO: 7.4 FL (ref 6–12)
POTASSIUM SERPL-SCNC: 4.1 MMOL/L (ref 3.7–5.3)
PROTEIN UA: NEGATIVE
RBC # BLD: 4.4 M/UL (ref 4–5.2)
RBC UA: NORMAL /HPF
SALICYLATE LEVEL: <1 MG/DL (ref 3–10)
SARS-COV-2, RAPID: NOT DETECTED
SEG NEUTROPHILS: 52 % (ref 34–64)
SEGMENTED NEUTROPHILS ABSOLUTE COUNT: 4.6 K/UL (ref 1.3–9.1)
SODIUM BLD-SCNC: 138 MMOL/L (ref 135–144)
SPECIFIC GRAVITY UA: 1 (ref 1–1.03)
SPECIMEN DESCRIPTION: NORMAL
TOTAL PROTEIN: 7.1 G/DL (ref 6.4–8.3)
TURBIDITY: CLEAR
URINE HGB: NEGATIVE
UROBILINOGEN, URINE: NORMAL
WBC # BLD: 8.8 K/UL (ref 4.5–13.5)
WBC UA: NORMAL /HPF

## 2022-04-08 PROCEDURE — 83690 ASSAY OF LIPASE: CPT

## 2022-04-08 PROCEDURE — 1111F DSCHRG MED/CURRENT MED MERGE: CPT | Performed by: INTERNAL MEDICINE

## 2022-04-08 PROCEDURE — 87635 SARS-COV-2 COVID-19 AMP PRB: CPT

## 2022-04-08 PROCEDURE — 84703 CHORIONIC GONADOTROPIN ASSAY: CPT

## 2022-04-08 PROCEDURE — 99214 OFFICE O/P EST MOD 30 MIN: CPT | Performed by: INTERNAL MEDICINE

## 2022-04-08 PROCEDURE — 6370000000 HC RX 637 (ALT 250 FOR IP): Performed by: PSYCHIATRY & NEUROLOGY

## 2022-04-08 PROCEDURE — 99223 1ST HOSP IP/OBS HIGH 75: CPT | Performed by: PSYCHIATRY & NEUROLOGY

## 2022-04-08 PROCEDURE — 85025 COMPLETE CBC W/AUTO DIFF WBC: CPT

## 2022-04-08 PROCEDURE — 81001 URINALYSIS AUTO W/SCOPE: CPT

## 2022-04-08 PROCEDURE — 99285 EMERGENCY DEPT VISIT HI MDM: CPT

## 2022-04-08 PROCEDURE — 80053 COMPREHEN METABOLIC PANEL: CPT

## 2022-04-08 PROCEDURE — 36415 COLL VENOUS BLD VENIPUNCTURE: CPT

## 2022-04-08 PROCEDURE — 1240000000 HC EMOTIONAL WELLNESS R&B

## 2022-04-08 PROCEDURE — 2580000003 HC RX 258: Performed by: STUDENT IN AN ORGANIZED HEALTH CARE EDUCATION/TRAINING PROGRAM

## 2022-04-08 PROCEDURE — G0480 DRUG TEST DEF 1-7 CLASSES: HCPCS

## 2022-04-08 PROCEDURE — 80179 DRUG ASSAY SALICYLATE: CPT

## 2022-04-08 PROCEDURE — 80143 DRUG ASSAY ACETAMINOPHEN: CPT

## 2022-04-08 PROCEDURE — APPSS60 APP SPLIT SHARED TIME 46-60 MINUTES: Performed by: NURSE PRACTITIONER

## 2022-04-08 RX ORDER — HALOPERIDOL 5 MG/ML
5 INJECTION INTRAMUSCULAR EVERY 6 HOURS PRN
Status: DISCONTINUED | OUTPATIENT
Start: 2022-04-08 | End: 2022-04-14 | Stop reason: HOSPADM

## 2022-04-08 RX ORDER — MAGNESIUM HYDROXIDE/ALUMINUM HYDROXICE/SIMETHICONE 120; 1200; 1200 MG/30ML; MG/30ML; MG/30ML
30 SUSPENSION ORAL EVERY 6 HOURS PRN
Status: DISCONTINUED | OUTPATIENT
Start: 2022-04-08 | End: 2022-04-14 | Stop reason: HOSPADM

## 2022-04-08 RX ORDER — HYDROXYZINE 50 MG/1
50 TABLET, FILM COATED ORAL 3 TIMES DAILY PRN
Status: DISCONTINUED | OUTPATIENT
Start: 2022-04-08 | End: 2022-04-14 | Stop reason: HOSPADM

## 2022-04-08 RX ORDER — DIPHENHYDRAMINE HYDROCHLORIDE 50 MG/ML
50 INJECTION INTRAMUSCULAR; INTRAVENOUS EVERY 6 HOURS PRN
Status: DISCONTINUED | OUTPATIENT
Start: 2022-04-08 | End: 2022-04-14 | Stop reason: HOSPADM

## 2022-04-08 RX ORDER — TRAZODONE HYDROCHLORIDE 50 MG/1
50 TABLET ORAL NIGHTLY PRN
Status: DISCONTINUED | OUTPATIENT
Start: 2022-04-08 | End: 2022-04-14 | Stop reason: HOSPADM

## 2022-04-08 RX ORDER — IBUPROFEN 400 MG/1
400 TABLET ORAL EVERY 6 HOURS PRN
Status: DISCONTINUED | OUTPATIENT
Start: 2022-04-08 | End: 2022-04-14 | Stop reason: HOSPADM

## 2022-04-08 RX ORDER — POLYETHYLENE GLYCOL 3350 17 G/17G
17 POWDER, FOR SOLUTION ORAL DAILY PRN
Status: DISCONTINUED | OUTPATIENT
Start: 2022-04-08 | End: 2022-04-14 | Stop reason: HOSPADM

## 2022-04-08 RX ORDER — LORAZEPAM 2 MG/ML
2 INJECTION INTRAMUSCULAR EVERY 6 HOURS PRN
Status: DISCONTINUED | OUTPATIENT
Start: 2022-04-08 | End: 2022-04-14 | Stop reason: HOSPADM

## 2022-04-08 RX ORDER — ONDANSETRON 2 MG/ML
4 INJECTION INTRAMUSCULAR; INTRAVENOUS ONCE
Status: DISCONTINUED | OUTPATIENT
Start: 2022-04-08 | End: 2022-04-14 | Stop reason: HOSPADM

## 2022-04-08 RX ORDER — RISPERIDONE 2 MG/1
2 TABLET, FILM COATED ORAL 2 TIMES DAILY
Status: DISCONTINUED | OUTPATIENT
Start: 2022-04-08 | End: 2022-04-08

## 2022-04-08 RX ORDER — ACETAMINOPHEN 325 MG/1
650 TABLET ORAL EVERY 6 HOURS PRN
Status: DISCONTINUED | OUTPATIENT
Start: 2022-04-08 | End: 2022-04-14 | Stop reason: HOSPADM

## 2022-04-08 RX ORDER — 0.9 % SODIUM CHLORIDE 0.9 %
1000 INTRAVENOUS SOLUTION INTRAVENOUS ONCE
Status: COMPLETED | OUTPATIENT
Start: 2022-04-08 | End: 2022-04-08

## 2022-04-08 RX ORDER — PALIPERIDONE 3 MG/1
3 TABLET, EXTENDED RELEASE ORAL DAILY
Status: DISCONTINUED | OUTPATIENT
Start: 2022-04-08 | End: 2022-04-14 | Stop reason: HOSPADM

## 2022-04-08 RX ADMIN — PALIPERIDONE 3 MG: 3 TABLET, EXTENDED RELEASE ORAL at 18:39

## 2022-04-08 RX ADMIN — HYDROXYZINE HYDROCHLORIDE 50 MG: 50 TABLET, FILM COATED ORAL at 20:50

## 2022-04-08 RX ADMIN — NICOTINE POLACRILEX 2 MG: 2 GUM, CHEWING BUCCAL at 16:35

## 2022-04-08 RX ADMIN — SODIUM CHLORIDE 1000 ML: 9 INJECTION, SOLUTION INTRAVENOUS at 12:06

## 2022-04-08 RX ADMIN — TRAZODONE HYDROCHLORIDE 50 MG: 50 TABLET ORAL at 20:50

## 2022-04-08 RX ADMIN — HYDROXYZINE HYDROCHLORIDE 50 MG: 50 TABLET, FILM COATED ORAL at 17:15

## 2022-04-08 ASSESSMENT — SLEEP AND FATIGUE QUESTIONNAIRES
DO YOU HAVE DIFFICULTY SLEEPING: NO
DO YOU USE A SLEEP AID: NO
AVERAGE NUMBER OF SLEEP HOURS: 8

## 2022-04-08 ASSESSMENT — ENCOUNTER SYMPTOMS
VOMITING: 1
COLOR CHANGE: 0
NAUSEA: 1
RHINORRHEA: 0
COUGH: 0
PHOTOPHOBIA: 0
FACIAL SWELLING: 0
EYE ITCHING: 0
ABDOMINAL PAIN: 1
SHORTNESS OF BREATH: 0
DIARRHEA: 0

## 2022-04-08 ASSESSMENT — LIFESTYLE VARIABLES
HISTORY_ALCOHOL_USE: NO
HISTORY_ALCOHOL_USE: NO

## 2022-04-08 ASSESSMENT — PAIN SCALES - GENERAL
PAINLEVEL_OUTOF10: 0
PAINLEVEL_OUTOF10: 4

## 2022-04-08 ASSESSMENT — PAIN DESCRIPTION - DESCRIPTORS: DESCRIPTORS: SHOOTING
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
## 2022-04-08 NOTE — PROGRESS NOTES
Medication History completed:    New medications: none    Medications discontinued: trazodone, ondansetron, ibuprofen, escitalopram    Changes to dosing: none    Stated allergies: NKDA    Other pertinent information: Medications confirmed with Tami. The patient reports that she is not taking the escitalopram which was prescribed at the beginning of March.      Thank you,  Annita Funk, PharmD, BCPS  [phone]
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
## 2022-04-08 NOTE — PROGRESS NOTES
Visit Information    Have you changed or started any medications since your last visit including any over-the-counter medicines, vitamins, or herbal medicines? no   Are you having any side effects from any of your medications? -  no  Have you stopped taking any of your medications? Is so, why? -  no    Have you seen any other physician or provider since your last visit? Yes - Records Obtained  Have you had any other diagnostic tests since your last visit? Yes - Records Obtained  Have you been seen in the emergency room and/or had an admission to a hospital since we last saw you? Yes - Records Obtained  Have you had your routine dental cleaning in the past 6 months? no    Have you activated your [employer] account? If not, what are your barriers?  Yes     Patient Care Team:  Yoselin Reilly MD as PCP - General (Internal Medicine)  [facility], APRN - CNP as PCP - [facility] Provider    Medical History Review  Past Medical, Family, and Social History reviewed and does contribute to the patient presenting condition    Health Maintenance   Topic Date Due    Hepatitis C screen  Never done    COVID-19 Vaccine (1) Never done    HPV vaccine (1 - 2-dose series) Never done    HIV screen  Never done    Chlamydia screen  Never done    Depression Monitoring  02/12/2022    Flu vaccine (Season Ended) 09/01/2022    DTaP/Tdap/Td vaccine (7 - Td or Tdap) 11/27/2023    Hepatitis B vaccine  Completed    Hib vaccine  Completed    Varicella vaccine  Completed    Pneumococcal 0-64 years Vaccine  Completed    Hepatitis A vaccine  Aged Out    Meningococcal (ACWY) vaccine  Aged Out

## 2022-04-08 NOTE — BH NOTE
Patient given tobacco quitline number 24625268354 at this time, refusing to call at this time, states \" I just dont want to quit now\"- patient given information as to the dangers of long term tobacco use. Continue to reinforce the importance of tobacco cessation.

## 2022-04-08 NOTE — PROGRESS NOTES
Behavioral Services  Medicare Certification Upon Admission    I certify that this patient's inpatient psychiatric hospital admission is medically necessary for:    [x] (1) Treatment which could reasonably be expected to improve this patient's condition,       [x] (2) Or for diagnostic study;     AND     [x](2) The inpatient psychiatric services are provided while the individual is under the care of a physician and are included in the individualized plan of care.     Estimated length of stay/service 3-9 days    Plan for post-hospital care -outpatient care    Electronically signed by Dylan Narvaez MD on 4/8/2022 at 6:06 PM

## 2022-04-08 NOTE — ED NOTES
Dr Madelin Roberson to admit under acute psychosis and voluntary faxed to [facility]/\[facility]\""

## 2022-04-08 NOTE — BH NOTE
[facility]  Admission Note     Admission Type:   Admission Type: Voluntary    Reason for admission:  Reason for Admission: Acute Psychosis with suicidal plan    PATIENT STRENGTHS:  Strengths: No significant Physical Illness,Positive Support    Patient Strengths and Limitations:  Limitations: Difficult relationships / poor social skills,Difficulty problem solving/relies on others to help solve problems,Inappropriate/potentially harmful leisure interests,Apathetic / unmotivated,Multiple barriers to leisure interests    Addictive Behavior:   Addictive Behavior  In the past 3 months, have you felt or has someone told you that you have a problem with:  : None  Do you have a history of Chemical Use?: No  Do you have a history of Alcohol Use?: No  Do you have a history of Street Drug Abuse?: No  Histroy of Prescripton Drug Abuse?: No    Medical Problems:   Past Medical History:   Diagnosis Date    ADHD (attention deficit hyperactivity disorder)     Anxiety     Depression        Status EXAM:  Status and Exam  Normal: No  Facial Expression: Flat  Affect: Blunt  Level of Consciousness: Alert  Mood:Normal: No  Mood: Depressed,Anxious  Motor Activity:Normal: Yes  Interview Behavior: Cooperative  Preception: [place] to Person,[place] to Time,[place] to Place,[place] to Situation  Attention:Normal: Yes  Thought Content:Normal: Yes  Hallucinations:  Auditory (Comment) (hearing voices saying \"to kill herself\" and that Joann Mullins is worthless)  Delusions: No  Memory:Normal: Yes  Insight and Judgment: No  Insight and Judgment: Poor Judgment,Poor Insight  Present Suicidal Ideation: Yes  Present Homicidal Ideation: No    Tobacco Screening:  Practical Counseling, on admission, jonatan X, if applicable and completed (first 3 are required if patient doesn't refuse):            ( )  Recognizing danger situations (included triggers and roadblocks)                    ( )  Coping skills (new ways to manage stress, exercise, relaxation techniques, changing routine, distraction)                                                           ( )  Basic information about quitting (benefits of quitting, techniques in how to quit, available resources  ( ) Referral for counseling faxed to [facility]                    ( x) Patient refused counseling  ( ) Patient has not smoked in the last 30 days    Metabolic Screening:    No results found for: LABA1C    No results found for: CHOL  No results found for: TRIG  No results found for: HDL  No components found for: LDLCAL  No results found for: LABVLDL      There is no height or weight on file to calculate BMI. BP Readings from Last 2 Encounters:   04/08/22 104/65   04/08/22 (!) 100/58           Pt admitted with followings belongings:  Dental Appliances: None  Vision - Corrective Lenses: None  Hearing Aid: None  Jewelry: None  Body Piercings Removed: N/A  Clothing: Jacket / coat,Pants,Shirt,Undergarments (Comment) (1 pair underwear)  Were All Patient Medications Collected?: Not Applicable  Other Valuables: Purse,Other (Comment),Money (Comment) (two visa cards, $7.85, [place] ID)     Patient's home medications were verified. Patient oriented to surroundings and program expectations and copy of patient rights given. Received admission packet:  yes. Consents reviewed, signed yes. Refused : no. Patient verbalize understanding:  yes.   Patient education on precautions: yes                   Wm Mason RN
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
## 2022-04-09 PROCEDURE — 1240000000 HC EMOTIONAL WELLNESS R&B

## 2022-04-09 PROCEDURE — 6370000000 HC RX 637 (ALT 250 FOR IP): Performed by: PSYCHIATRY & NEUROLOGY

## 2022-04-09 PROCEDURE — 99232 SBSQ HOSP IP/OBS MODERATE 35: CPT | Performed by: NURSE PRACTITIONER

## 2022-04-09 RX ADMIN — NICOTINE POLACRILEX 2 MG: 2 GUM, CHEWING BUCCAL at 11:28

## 2022-04-09 RX ADMIN — PALIPERIDONE 3 MG: 3 TABLET, EXTENDED RELEASE ORAL at 08:11

## 2022-04-09 RX ADMIN — HYDROXYZINE HYDROCHLORIDE 50 MG: 50 TABLET, FILM COATED ORAL at 11:28

## 2022-04-09 NOTE — BH NOTE
LOS:  PT resting in her bed will continue to monitor. SURGICAL AREA AND NEEDED MORE SURGERY TO GET THEM REMOVED    Kidney stone     RIGHT KIDNEY    Lumbar herniated disc 1989    Microhematuria     Obesity     GUTIERREZ on CPAP     Pancreatitis     Pneumonia     Tinnitus     Use of cane as ambulatory aid     Uses brace     BACK BRACE AND LEFT HAND    Wears glasses     READING        Past Surgical History:     Past Surgical History:   Procedure Laterality Date    ABDOMEN SURGERY  11/18/2011    RIGHT COLECTOMY    APPENDECTOMY  2011    with colectomy    BACK SURGERY  2014    LUMBAR LAMINECTOMY    BACK SURGERY  03/01/2014    EVACUATION OF SPINAL HEMATOMA    CATARACT REMOVAL Bilateral 01/2018    CATARACTS WITH IOL PLACED    COLONOSCOPY      Removal of colon polyp    CORONARY ARTERY BYPASS GRAFT  09/28/2015    CABG BYPASS X 3    CYSTOSCOPY Right 08/23/2018    CYSTOSCOPY, INSERTION OCCLUSIVE BALLOON, RIGHT RETROGRADE PYELOGRAM     KNEE SURGERY Right 2007    TENDON REPAIR    NEPHROSTOMY Right 08/23/2018    HOLMIUM - NEPHROLITHOTOMY PERCUTANEOUS    NERVE BLOCK      STEROID INJECTIONS STARTING 2015 - 04/2018    LA CYSTOURETHROSCOPY Right 8/23/2018    CYSTOSCOPY, INSERTION OCCLUSIVE BALLOON, RIGHT RETROGRADE PYELOGRAM performed by Vitor Isaac MD at [facility] 89 TO 2 CM Right 8/23/2018    HOLMIUM - NEPHROLITHOTOMY PERCUTANEOUS, C-ARM, LITHOCLAST,  PT COMING FROM INTERV RADIOLOGY  *SHORT STAY performed by Vitor Isaac MD at [address] [place] [address]        Medications Prior to Admission:     Prior to Admission medications    Medication Sig Start Date End Date Taking?  Authorizing Provider   amLODIPine (NORVASC) 5 MG tablet TAKE 1 TABLET DAILY 7/24/18  Yes Quin Cordova MD   metoprolol succinate (TOPROL XL) 50 MG extended release tablet TAKE 1 TABLET DAILY 7/24/18  Yes Quin Cordova MD   spironolactone (ALDACTONE) 25 MG tablet TAKE 1 TABLET DAILY 7/24/18  Yes Quin Cordova MD montelukast (SINGULAIR) 10 MG tablet TAKE 1 TABLET DAILY 7/24/18  Yes Kelly Junior MD   atorvastatin (LIPITOR) 40 MG tablet TAKE 1 TABLET DAILY 7/24/18  Yes Kelly Junior MD   omeprazole (PRILOSEC) 40 MG delayed release capsule TAKE 1 CAPSULE DAILY 7/24/18  Yes Kelly Junior MD   losartan (COZAAR) 100 MG tablet TAKE 1 TABLET DAILY 7/24/18  Yes Kelly Junior MD   metFORMIN (GLUCOPHAGE) 500 MG tablet Take 2 tablets by mouth 2 times daily 1/29/18  Yes Kelly Junior MD   insulin lispro prot & lispro (HUMALOG MIX 50/50 KWIKPEN) (50-50) 100 UNIT per ML SUPN injection pen 90 u in am,60 at 6pm, 30 night  Patient taking differently: Inject into the skin 3 times daily 90 u in am,60u at 6pm, 30u night 1/29/18  Yes Kelly Junior MD   Respiratory Therapy Supplies MISC Cpap supplies- tubing, mask, filter 1/29/18  Yes Kelly Junior MD   Elastic Bandages & Supports (WRIST BRACE ULTRA-LITE) MISC Use as directed 1/29/18  Yes Kelly Junior MD   sildenafil (VIAGRA) 100 MG tablet Take 1 tablet by mouth as needed for Erectile Dysfunction 5/31/17  Yes Kelly Junior MD   diclofenac (VOLTAREN) 75 MG EC tablet TAKE 1 TABLET TWICE A DAY  Patient taking differently: TAKE 1 TABLET TWICE A DAY. PATIENT TO STOP TAKING 08/17/2018 FOR SURGERY. 7/24/18   Kelly Junior MD   Naproxen Sodium (ALEVE) 220 MG CAPS Take 2 capsules by mouth daily PATIENT TO STOP TAKING 08/17/2018 FOR SURGERY. Historical Provider, MD   aspirin 81 MG tablet Take 81 mg by mouth daily PATIENT TO STOP TAKING 08/17/2018 FOR SURGERY. Historical Provider, MD      Allergies:     Patient has no known allergies. Social History:     Tobacco:    reports that he has never smoked. He has never used smokeless tobacco.  Alcohol:      reports that he does not drink alcohol. Drug Use:  reports that he does not use drugs.     Family History:     Family History   Problem Relation Age of Onset    Diabetes Father     High Blood distress  Mental status: oriented to person, place, and time with normal affect  Head:  normocephalic, atraumatic. Eye: no icterus, redness, pupils equal and reactive, extraocular eye movements intact, conjunctiva clear  Ear: normal external ear, no discharge, hearing intact  Nose:  no drainage noted  Mouth: mucous membranes moist  Neck: supple, no carotid bruits, thyroid not palpable  Lungs: Bilateral equal air entry, clear to ausculation, no wheezing, rales or rhonchi, normal effort  Cardiovascular: normal rate, regular rhythm, no murmur, gallop, rub.   Abdomen: Soft, nontender, nondistended, normal bowel sounds, no hepatomegaly or splenomegaly  Neurologic: There are no new focal motor or sensory deficits, normal muscle tone and bulk, no abnormal sensation, normal speech, cranial nerves II through XII grossly intact  Skin: No gross lesions, rashes, bruising or bleeding on exposed skin area  Extremities:  peripheral pulses palpable, no pedal edema or calf pain with palpation  Psych: normal affect    Investigations:      Laboratory Testing:  Recent Results (from the past 24 hour(s))   POC Glucose Fingerstick    Collection Time: 08/24/18 12:20 PM   Result Value Ref Range    POC Glucose 298 (H) 75 - 110 mg/dL   POC Glucose Fingerstick    Collection Time: 08/24/18  5:17 PM   Result Value Ref Range    POC Glucose 291 (H) 75 - 110 mg/dL   EKG 12 Lead    Collection Time: 08/24/18  6:10 PM   Result Value Ref Range    Ventricular Rate 73 BPM    Atrial Rate 73 BPM    P-R Interval 184 ms    QRS Duration 126 ms    Q-T Interval 418 ms    QTc Calculation (Bazett) 460 ms    P Axis 27 degrees    R Axis -38 degrees    T Axis 65 degrees   POTASSIUM    Collection Time: 08/24/18  6:57 PM   Result Value Ref Range    Potassium 5.7 (H) 3.7 - 5.3 mmol/L   Troponin    Collection Time: 08/24/18  6:57 PM   Result Value Ref Range    Troponin T <0.03 <0.03 ng/mL    Troponin Interp         POC Glucose Fingerstick    Collection Time: 08/24/18 9:21 PM   Result Value Ref Range    POC Glucose 318 (H) 75 - 110 mg/dL   POC Glucose Fingerstick    Collection Time: 08/24/18 10:51 PM   Result Value Ref Range    POC Glucose 289 (H) 75 - 110 mg/dL   POTASSIUM    Collection Time: 08/25/18  1:51 AM   Result Value Ref Range    Potassium 5.6 (H) 3.7 - 5.3 mmol/L   CBC    Collection Time: 08/25/18  6:03 AM   Result Value Ref Range    WBC 14.5 (H) 3.5 - 11.3 k/uL    RBC 3.98 (L) 4.21 - 5.77 m/uL    Hemoglobin 11.9 (L) 13.0 - 17.0 g/dL    Hematocrit 38.0 (L) 40.7 - 50.3 %    MCV 95.5 82.6 - 102.9 fL    MCH 29.9 25.2 - 33.5 pg    MCHC 31.3 28.4 - 34.8 g/dL    RDW 14.0 11.8 - 14.4 %    Platelets 451 779 - 804 k/uL    MPV 10.8 8.1 - 13.5 fL    NRBC Automated 0.0 0.0 per 100 WBC   Basic Metabolic Panel    Collection Time: 08/25/18  6:03 AM   Result Value Ref Range    Glucose 363 (H) 70 - 99 mg/dL    BUN 37 (H) 8 - 23 mg/dL    CREATININE 1.64 (H) 0.70 - 1.20 mg/dL    Bun/Cre Ratio NOT REPORTED 9 - 20    Calcium 8.3 (L) 8.6 - 10.4 mg/dL    Sodium 131 (L) 135 - 144 mmol/L    Potassium 5.8 (H) 3.7 - 5.3 mmol/L    Chloride 101 98 - 107 mmol/L    CO2 15 (L) 20 - 31 mmol/L    Anion Gap 15 9 - 17 mmol/L    GFR Non-African American 41 (L) >60 mL/min    GFR African American 50 (L) >60 mL/min    GFR Comment          GFR Staging NOT REPORTED      Assessment :    Principal Problem:    Urolithiasis  Active Problems:    Obesity    GUTIERREZ (obstructive sleep apnea)    Lumbar herniated disc    Hyperkalemia    CKD (chronic kidney disease), stage II    Uncontrolled type 2 diabetes mellitus with diabetic neuropathic arthropathy, with long-term current use of insulin (HCC)    Diffuse large B-cell lymphoma of intra-abdominal lymph nodes (HCC)    Plan:     Principal Problem:    Urolithiasis, status post PCNL and PCNT removal now. Has antegrade stent in place. Hyperkalemia: Multifactorial; Likely from medications along with urinary retention on right side levy operatively. Expected improvement.  Held losartan and aldactone. Recheck today, insulin and dextrose PRN. Not safe for discharge yet until hyperkalemia resolves    CKD vs JANIE; Baseline creatinine seems to be around 1.4 to 1.7 last 2 years, also was taking NSAID, not sure how much NSAID induced damage. Stop at discharge. Discussed with patient at bedside    Obesity    GUTIERREZ (obstructive sleep apnea)    Lumbar herniated disc    Hyperkalemia    CKD (chronic kidney disease), stage II    Uncontrolled type 2 diabetes mellitus with diabetic neuropathic arthropathy, with long-term current use of insulin ([facility] [address]): Given one dose of lantus 80.  Add lispro 30 units three times daily withmeals    Diffuse large B-cell lymphoma of intra-abdominal lymph nodes (HCC)    Discussed with patient at bedside  Stop IV fluids    Consultations:   Carlita Blood HOSPITALIST      Jean-Paul Vences MD  8/25/2018  8:15 AM    Copy sent to Dr. Sonali Pollard MD

## 2022-04-09 NOTE — PLAN OF CARE
[facility]  Initial Interdisciplinary Treatment Plan NO      Original treatment plan Date & Time: 04/09/2022 0936    Admission Type:  Admission Type: Voluntary    Reason for admission:   Reason for Admission: Acute Psychosis with suicidal plan    Estimated Length of Stay:  5-7days  Estimated Discharge Date: to be determined by physician    PATIENT STRENGTHS:  Patient Strengths:Strengths: No significant Physical Illness,Positive Support  Patient Strengths and Limitations:Limitations: Difficult relationships / poor social skills,Difficulty problem solving/relies on others to help solve problems,Inappropriate/potentially harmful leisure interests,Apathetic / unmotivated,Multiple barriers to leisure interests  Addictive Behavior: Addictive Behavior  In the past 3 months, have you felt or has someone told you that you have a problem with:  : None  Do you have a history of Chemical Use?: No  Do you have a history of Alcohol Use?: No  Do you have a history of Street Drug Abuse?: Yes  Histroy of Prescripton Drug Abuse?: No  Medical Problems:  Past Medical History:   Diagnosis Date    ADHD (attention deficit hyperactivity disorder)     Anxiety     Depression      Status EXAM:Status and Exam  Normal: No  Facial Expression: Flat  Affect: Appropriate  Level of Consciousness: Alert  Mood:Normal: No  Mood: Depressed  Motor Activity:Normal: Yes  Interview Behavior: Cooperative  Preception: [place] to Person,[place] to Time,[place] to Place,[place] to Situation  Attention:Normal: Yes  Thought Processes: Circumstantial  Thought Content:Normal: Yes  Hallucinations:  Auditory (Comment)  Delusions: No  Memory:Normal: Yes  Memory: Poor Recent  Insight and Judgment: No  Insight and Judgment: Poor Judgment,Poor Insight  Present Suicidal Ideation: No  Present Homicidal Ideation: No    EDUCATION:   Learner Progress Toward Treatment Goals: reviewed group plans and strategies for care    Method:group therapy, medication compliance, individualized assessments and care planning    Outcome: needs reinforcement    PATIENT GOALS: to be discussed with patient within 72 hours    PLAN/TREATMENT RECOMMENDATIONS:     continue group therapy , medications compliance, goal setting, individualized assessments and care, continue to monitor pt on unit      SHORT-TERM GOALS:   Time frame for Short-Term Goals: 5-7 days    LONG-TERM GOALS:  Time frame for Long-Term Goals: 6 months  Members Present in Team Meeting: See Signature Sheet    Brandon Rod
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
## 2022-04-09 NOTE — PLAN OF CARE
Problem: Depressive Behavior With or Without Suicide Precautions:  Goal: Able to verbalize and/or display a decrease in depressive symptoms  Description: Able to verbalize and/or display a decrease in depressive symptoms  Note: PT rates depression and anxiety a 5 out of 10. PT denies any current paranoia or hallucinations. PT reports poor sleep prior to admission and is hoping for a good night sleep. PT appears to have rested well throughout the night. Problem: Depressive Behavior With or Without Suicide Precautions:  Goal: Ability to disclose and discuss suicidal ideas will improve  Description: Ability to disclose and discuss suicidal ideas will improve  Note: Pt admits to having thoughts of self harm. Agreeable to seeking out staff should feelings increase. Able to identify positive coping skills and plan for safety. Pt remains a line of sight for safety.

## 2022-04-09 NOTE — PLAN OF CARE
Problem: Pain:  Goal: Pain level will decrease  Description: Pain level will decrease  Outcome: Ongoing  Note: Patient denies pain upon assessment this shift. Will monitor. Problem: Pain:  Goal: Control of acute pain  Description: Control of acute pain  Outcome: Ongoing     Problem: Pain:  Goal: Control of chronic pain  Description: Control of chronic pain  4/9/2022 0601 by Rhoda Knight RN  Outcome: Completed     Problem: Depressive Behavior With or Without Suicide Precautions:  Goal: Able to verbalize acceptance of life and situations over which he or she has no control  Description: Able to verbalize acceptance of life and situations over which he or she has no control  Outcome: Ongoing     Problem: Depressive Behavior With or Without Suicide Precautions:  Goal: Able to verbalize and/or display a decrease in depressive symptoms  Description: Able to verbalize and/or display a decrease in depressive symptoms  4/9/2022 0934 by Peggy Hong RN  Outcome: Ongoing  Note: Patient alert and orient x 4. Speech clear. Patient presents this shift with flat affect and makes fair eye contact. Patient engages in 1:1 talk but answers with 1 to 2 word responses. Patient denies suicidal/homicidal/self harm ideations. Patient verbalizes improvement in auditory hallucinations. Patient is compliant with oral medication and in behavioral control. Patient is out in day room for intervals but isolates to room as well. Social with select peers. Patient reports sleep/appetite are fair. Support and reassurance provided. Safety maintained with every 15 minute checks and as needed.       Problem: Tobacco Use:  Goal: Inpatient tobacco use cessation counseling participation  Description: Inpatient tobacco use cessation counseling participation  Outcome: Ongoing  Note: Patient given tobacco quitline number 27420199229 at this time, refusing to call at this time, states \" I just dont want to quit now\"- patient given information as to the dangers of long term tobacco use. Continue to reinforce the importance of tobacco cessation.        Problem: Depressive Behavior With or Without Suicide Precautions:  Goal: Ability to disclose and discuss suicidal ideas will improve  Description: Ability to disclose and discuss suicidal ideas will improve  4/9/2022 0934 by Suzanne Hidalgo RN  Outcome: Ongoing

## 2022-04-09 NOTE — BH NOTE
Patient is complaining of feeling anxious and requested their 50mg oral of Atarax per orders. Patient is currently sitting in the day area, and showing no signs of distress. Safety checks are maintained every 15 minutes and at irregular intervals. 1159 Medication effective with patient watching television.

## 2022-04-10 PROCEDURE — 6370000000 HC RX 637 (ALT 250 FOR IP): Performed by: NURSE PRACTITIONER

## 2022-04-10 PROCEDURE — 99232 SBSQ HOSP IP/OBS MODERATE 35: CPT | Performed by: NURSE PRACTITIONER

## 2022-04-10 PROCEDURE — 6370000000 HC RX 637 (ALT 250 FOR IP): Performed by: PSYCHIATRY & NEUROLOGY

## 2022-04-10 PROCEDURE — 99223 1ST HOSP IP/OBS HIGH 75: CPT | Performed by: INTERNAL MEDICINE

## 2022-04-10 PROCEDURE — 1240000000 HC EMOTIONAL WELLNESS R&B

## 2022-04-10 RX ORDER — PROPRANOLOL HYDROCHLORIDE 20 MG/1
10 TABLET ORAL 3 TIMES DAILY
Status: DISCONTINUED | OUTPATIENT
Start: 2022-04-10 | End: 2022-04-11

## 2022-04-10 RX ADMIN — TRAZODONE HYDROCHLORIDE 50 MG: 50 TABLET ORAL at 21:09

## 2022-04-10 RX ADMIN — HYDROXYZINE HYDROCHLORIDE 50 MG: 50 TABLET, FILM COATED ORAL at 09:53

## 2022-04-10 RX ADMIN — HYDROXYZINE HYDROCHLORIDE 50 MG: 50 TABLET, FILM COATED ORAL at 13:40

## 2022-04-10 RX ADMIN — NICOTINE POLACRILEX 2 MG: 2 GUM, CHEWING BUCCAL at 19:30

## 2022-04-10 RX ADMIN — HYDROXYZINE HYDROCHLORIDE 50 MG: 50 TABLET, FILM COATED ORAL at 20:16

## 2022-04-10 RX ADMIN — PALIPERIDONE 3 MG: 3 TABLET, EXTENDED RELEASE ORAL at 08:39

## 2022-04-10 RX ADMIN — NICOTINE POLACRILEX 2 MG: 2 GUM, CHEWING BUCCAL at 08:42

## 2022-04-10 RX ADMIN — PROPRANOLOL HYDROCHLORIDE 10 MG: 20 TABLET ORAL at 21:09

## 2022-04-10 RX ADMIN — NICOTINE POLACRILEX 2 MG: 2 GUM, CHEWING BUCCAL at 13:25

## 2022-04-10 NOTE — H&P
[facility] Internal Medicine    CONSULTATION / HISTORY AND PHYSICAL EXAMINATION            Date:   4/10/2022  Patient name:  Linn Rodriguez  Date of admission:  4/8/2022 11:34 AM  MRN:   [id number]  Account:  [de-identified]  YOB: 2000  PCP:    Pietro Fernandez MD  Room:   0236/0236-01  Code Status:    Full Code    Physician Requesting Consult: Chidi Calderon MD    Reason for Consult:  medical management    Chief Complaint:     Chief Complaint   Patient presents with    Emesis    Suicidal    n v      History Obtained From:     Patient medical record nursing staff    History of Present Illness:     53-year-old lady admitted for depression and anxiety prior suicide attempt jump from a high rise bilateral foot fractures now able to walk  Chronic nausea vomiting but no significant weight loss patient denies any street drug abuse no fever chills no rash no joint pain has not had any GI work-up in her outpatient    Past Medical History:     Past Medical History:   Diagnosis Date    ADHD (attention deficit hyperactivity disorder)     Anxiety     Depression         Past Surgical History:     Past Surgical History:   Procedure Laterality Date    ADENOIDECTOMY      FOOT FRACTURE SURGERY Bilateral 12/16/2021    LEFT CALCANEOUS OPEN REDUCTION INTERNAL FIXATION AND RIGHT FOOT FRACTURES OPEN REDUCTION INTERNAL FIXATION performed by Armando Dominique MD at [facility]  2003        Medications Prior to Admission:     Prior to Admission medications    Medication Sig Start Date End Date Taking? Authorizing Provider   risperiDONE (RISPERDAL) 2 MG tablet Take 2 mg by mouth 2 times daily  3/1/22   Historical Provider, MD   hydrOXYzine (ATARAX) 25 MG tablet Take 25 mg by mouth every 8 hours as needed for Anxiety  3/3/22   Historical Provider, MD        Allergies:     Patient has no known allergies. Social History:     Tobacco:    reports that she has quit smoking.  She palpable  Lungs: Bilateral equal air entry, clear to ausculation, no wheezing, rales or rhonchi, normal effort  Cardiovascular: normal rate, regular rhythm, no murmur, gallop, rub. Abdomen: Soft, nontender, nondistended, normal bowel sounds, no hepatomegaly or splenomegaly  Neurologic: There are no new focal motor or sensory deficits, normal muscle tone and bulk, no abnormal sensation, normal speech, cranial nerves II through XII grossly intact  Skin: No gross lesions, rashes, bruising or bleeding on exposed skin area  Extremities:  peripheral pulses palpable, no pedal edema or calf pain with palpation  Psych: Flat affect    Investigations:      Laboratory Testing:  No results found for this or any previous visit (from the past 24 hour(s)). Consultations:   IP CONSULT TO INTERNAL MEDICINE  Assessment :      Primary Problem  Acute psychosis [facility])    Active Hospital Problems    Diagnosis Date Noted    [facility] [F23[facility] 12/09/2021       Plan:     Chronic intermittent nausea vomiting will need outpatient EGD and gastric emptying study  History of suicide attempt with jump from a high rise both foot fractures now healing able to walk without cane or support  Labs and vitals reviewed        Michele Le MD  4/10/2022  12:25 PM    Copy sent to Dr. Darlyn Ch MD    Please note that this chart was generated using voice recognition Dragon dictation software. Although every effort was made to ensure the accuracy of this automated transcription, some errors in transcription may have occurred.

## 2022-04-10 NOTE — PLAN OF CARE
5 [facility]  Day 3 Interdisciplinary Treatment Plan NOTE    Review Date & Time: $/10/2022                            1322    Admission Type:   Admission Type: Voluntary    Reason for admission:  Reason for Admission: Acute Psychosis with suicidal plan  Estimated Length of Stay: 5-7 days  Estimated Discharge Date Update: to be determined by physician    PATIENT STRENGTHS:  Patient Strengths Strengths: No significant Physical Illness,Positive Support  Patient Strengths and Limitations:Limitations: Difficult relationships / poor social skills,Difficulty problem solving/relies on others to help solve problems,Inappropriate/potentially harmful leisure interests,Apathetic / unmotivated,Multiple barriers to leisure interests  Addictive Behavior:Addictive Behavior  In the past 3 months, have you felt or has someone told you that you have a problem with:  : None  Do you have a history of Chemical Use?: No  Do you have a history of Alcohol Use?: No  Do you have a history of Street Drug Abuse?: Yes  Histroy of Prescripton Drug Abuse?: No  Medical Problems:  Past Medical History:   Diagnosis Date    ADHD (attention deficit hyperactivity disorder)     Anxiety     Depression        Risk:  Fall RiskTotal: 57  Alex Scale Alex Scale Score: 22  BVC    Change in scores no Changes to plan of Care no    Status EXAM:   Status and Exam  Normal: No  Facial Expression: Flat  Affect: Appropriate  Level of Consciousness: Alert  Mood:Normal: No  Mood: Depressed  Motor Activity:Normal: Yes  Interview Behavior: Cooperative  Preception: [place] to Person,[place] to Time,[place] to Place,[place] to Situation  Attention:Normal: Yes  Thought Processes: Circumstantial  Thought Content:Normal: No  Thought Content: Preoccupations  Hallucinations:  Auditory (Comment)  Delusions: No  Memory:Normal: Yes  Memory: Poor Recent  Insight and Judgment: No  Insight and Judgment: Poor Judgment,Poor Insight  Present Suicidal Ideation: No  Present Homicidal Ideation: No    Daily Assessment Last Entry:   Daily Sleep (WDL): Exceptions to WDL         Patient Currently in Pain: Denies  Daily Nutrition (WDL): Within Defined Limits    Patient Monitoring:  Frequency of Checks: 4 times per hour, close    Psychiatric Symptoms:   Depression Symptoms  Depression Symptoms: Isolative,Loss of interest  Anxiety Symptoms  Anxiety Symptoms: Generalized  Linda Symptoms  Linda Symptoms: No problems reported or observed. Psychosis Symptoms  Delusion Type: No problems reported or observed. Suicide Risk CSSR-S:  1) Within the past month, have you wished you were dead or wished you could go to sleep and not wake up? : Yes  2) Have you actually had any thoughts of killing yourself? : Yes  3) Have you been thinking about how you might kill yourself? : Yes  5) Have you started to work out or worked out the details of how to kill yourself? Do you intend to carry out this plan? : No  6) Have you ever done anything, started to do anything, or prepared to do anything to end your life?: No  Change in Result                no                    Change in Plan of care                  no      EDUCATION:   EDUCATION:   Learner Progress Toward Treatment Goals: Reviewed results and recommendations of this team, Reviewed group plan and strategies, Reviewed signs, symptoms and risk of self harm and violent behavior, Reviewed goals and plan of care    Method:small group, individual verbal education    Outcome:verbalized by patient, but needs reinforcement to obtain goals    PATIENT GOALS:  Short term:Pt declined to meet with team,pt focused on talking on phone.  Pt did not develop a short term goal.  Long term: Pt did not develop a long term goal.    PLAN/TREATMENT RECOMMENDATIONS UPDATE: continue with group therapies, increased socialization, continue planning for after discharge goals, continue with medication compliance    SHORT-TERM GOALS UPDATE:   Time frame for Short-Term Goals: 5-7 days    LONG-TERM GOALS UPDATE:   Time frame for Long-Term Goals: 6 months  Members Present in Team Meeting: See Signature Sheet    Ila Deutsch

## 2022-04-10 NOTE — PLAN OF CARE
Problem: Pain:  Goal: Pain level will decrease  Description: Pain level will decrease  Outcome: Ongoing     Problem: Depressive Behavior With or Without Suicide Precautions:  Goal: Able to verbalize acceptance of life and situations over which he or she has no control  Description: Able to verbalize acceptance of life and situations over which he or she has no control  4/9/2022 2342 by Sofya Buck LPN  Outcome: Ongoing     Problem: Depressive Behavior With or Without Suicide Precautions:  Goal: Able to verbalize and/or display a decrease in depressive symptoms  Description: Able to verbalize and/or display a decrease in depressive symptoms  4/9/2022 2342 by Sofya Buck LPN  Outcome: Ongoing     Problem: Depressive Behavior With or Without Suicide Precautions:  Goal: Ability to disclose and discuss suicidal ideas will improve  Description: Ability to disclose and discuss suicidal ideas will improve  4/9/2022 2342 by Sofya Buck LPN  Outcome: Ongoing  Note: Patient denies feelings of wanting to hurt self or others.

## 2022-04-10 NOTE — PROGRESS NOTES
Daily Progress Note  4/10/2022    Patient Name: Ace Niece: Depression with suicidal ideation and a plan with intent to stab self. Acute psychosis. SUBJECTIVE:     Patient seen face-to-face for follow-up assessment. She reports significant anxiety. States that she feels on edge, her hands are sweating, and her heart is racing. She has been utilizing hydroxyzine without benefit. Discussed risks versus benefits of propranolol as well as side effects. Patient is agreeable to start medication. Reviewed patient's vitals, heart rate is on the higher side of appropriate range. Blood pressure in within normal limits. Patient is paranoid and guarded. Reluctant to share any information as she feels it would keep her from being discharged. She makes poor eye contact with writer. Appears to be internally preoccupied. She is withdrawn and isolative. Remains bizarre and delusional.  Patient lacks insight. She is a significant risk to self. Has poor boundaries and understanding of safety. Would not be safe from self off unit at this time secondary to acute psychosis. She does report feeling confused. She has difficulty focusing and appears distracted. Denies perceptual disturbances, but does seem internally preoccupied. Patient reporting improvement in suicidal ideation, but there is concern that she is minimizing these thoughts in order to be discharged. She requires continued inpatient hospitalization for safety and stability.     Compliant with scheduled medications: [x] Yes    [] No     Received emergency medications in past 24 hrs: [] Yes   [x] No    Appetite:  [x] Normal/Adequate/Unchanged  [] Increased  [] Decreased      Sleep:       [] Normal/Adequate/Unchanged  [] Fair  [x] Poor      Group Attendance on Unit:   [] Yes  [] Selectively    [x] No    Medication Side Effects: Denies         Mental Status Exam  Level of consciousness: Alert and awake   Appearance: Appropriate attire for setting, standing, with poor grooming and hygiene, wearing bilateral boots secondary to foot fracture  Behavior/Motor: Approachable, restless  Attitude toward examiner: Cooperative, distracted, poor eye contact  Speech: Increased latency, normal volume, good articulation  Mood: Patient reports \"very anxious\"  Affect: Congruent, aloof, bizarre  Thought processes: illogical , disorganized  Thought content: Denies homicidal ideation  Suicidal Ideation: Reports improving suicidal ideation, unable to contract for safety off unit. Delusions: Paranoia  Perceptual Disturbance: Patient appears to be responding to internal stimuli. Denies auditory hallucinations. Denies visual hallucinations. Cognition: Oriented to self,, location, and time, and not situation memory: impaired   Insight & Judgement: Poor to absent    Data   height is 5' 2\" (1.575 m) and weight is 133 lb (60.3 kg). Her oral temperature is 97.6 °F (36.4 °C). Her blood pressure is 120/68 and her pulse is 96. Her respiration is 14 and oxygen saturation is 98%.    Labs:   Admission on 04/08/2022   Component Date Value Ref Range Status    WBC 04/08/2022 8.8  4.5 - 13.5 k/uL Final    RBC 04/08/2022 4.40  4.0 - 5.2 m/uL Final    Hemoglobin 04/08/2022 12.9  12.0 - 16.0 g/dL Final    Hematocrit 04/08/2022 38.0  36 - 46 % Final    MCV 04/08/2022 86.4  80 - 100 fL Final    MCH 04/08/2022 29.4  26 - 34 pg Final    MCHC 04/08/2022 34.0  31 - 37 g/dL Final    RDW 04/08/2022 13.6  11.5 - 14.9 % Final    Platelets 74/17/8471 297  150 - 450 k/uL Final    MPV 04/08/2022 7.4  6.0 - 12.0 fL Final    Seg Neutrophils 04/08/2022 52  34 - 64 % Final    Lymphocytes 04/08/2022 35  25 - 45 % Final    Monocytes 04/08/2022 8  2 - 8 % Final    Eosinophils % 04/08/2022 4  0 - 4 % Final    Basophils 04/08/2022 1  0 - 2 % Final    Segs Absolute 04/08/2022 4.60  1.3 - 9.1 k/uL Final    Absolute Lymph # 04/08/2022 3.10  1.2 - 5.2 k/uL Final    Absolute Mono # 04/08/2022 0.70  0.1 - 1.3 k/uL Final    Absolute Eos # 04/08/2022 0.40  0.0 - 0.4 k/uL Final    Basophils Absolute 04/08/2022 0.10  0.0 - 0.2 k/uL Final    Glucose 04/08/2022 74  70 - 99 mg/dL Final    BUN 04/08/2022 6  6 - 20 mg/dL Final    CREATININE 04/08/2022 0.51  0.50 - 0.90 mg/dL Final    Calcium 04/08/2022 9.2  8.6 - 10.4 mg/dL Final    Sodium 04/08/2022 138  135 - 144 mmol/L Final    Potassium 04/08/2022 4.1  3.7 - 5.3 mmol/L Final    Chloride 04/08/2022 101  98 - 107 mmol/L Final    CO2 04/08/2022 25  20 - 31 mmol/L Final    Anion Gap 04/08/2022 12  9 - 17 mmol/L Final    Alkaline Phosphatase 04/08/2022 78  35 - 104 U/L Final    ALT 04/08/2022 9  5 - 33 U/L Final    AST 04/08/2022 11  <32 U/L Final    Total Bilirubin 04/08/2022 0.19* 0.3 - 1.2 mg/dL Final    Total Protein 04/08/2022 7.1  6.4 - 8.3 g/dL Final    Albumin 04/08/2022 4.6  3.5 - 5.2 g/dL Final    GFR Non- 04/08/2022 >60  >60 mL/min Final    GFR  04/08/2022 >60  >60 mL/min Final    GFR Comment 04/08/2022        Final    Comment: Average GFR for 20-28 years old:   116 mL/min/1.73sq m  Chronic Kidney Disease:   <60 mL/min/1.73sq m  Kidney failure:   <15 mL/min/1.73sq m              eGFR calculated using average adult body mass.  Additional eGFR calculator available at:        P[employer]            Lipase 04/08/2022 23  13 - 60 U/L Final    Color, UA 04/08/2022 Yellow  Yellow Final    Turbidity UA 04/08/2022 Clear  Clear Final    Glucose, Ur 04/08/2022 NEGATIVE  NEGATIVE Final    Bilirubin Urine 04/08/2022 NEGATIVE  NEGATIVE Final    Ketones, Urine 04/08/2022 NEGATIVE  NEGATIVE Final    Specific Gravity, UA 04/08/2022 1.003  1.000 - 1.030 Final    Urine Hgb 04/08/2022 NEGATIVE  NEGATIVE Final    pH, UA 04/08/2022 7.0  5.0 - 8.0 Final    Protein, UA 04/08/2022 NEGATIVE  NEGATIVE Final    Urobilinogen, Urine 04/08/2022 Normal  Normal Final    Nitrite, Urine 04/08/2022 NEGATIVE  NEGATIVE Final    Leukocyte Esterase, Urine 04/08/2022 NEGATIVE  NEGATIVE Final    WBC, UA 04/08/2022 0 TO 2  /HPF Final    RBC, UA 04/08/2022 0 TO 2  /HPF Final    Casts UA 04/08/2022 0 TO 2  /LPF Final    Epithelial Cells UA 04/08/2022 0 TO 2  /HPF Final    Bacteria, UA 04/08/2022 None  None Final    hCG Qual 04/08/2022 NEGATIVE  NEGATIVE Final    Comment: Specimens with hCG levels near the threshold of the test (25 mIU/mL) may give a negative or   indeterminate result. In such cases, another test should be performed with a new specimen   in 48-72 hours. If early pregnancy is suspected clinically in this setting, correlation   with quantitative serum b-hCG level is suggested.  Ethanol 04/08/2022 <10  <10 mg/dL Final    Ethanol percent 04/08/2022 <0.010  % Final    Acetaminophen Level 04/08/2022 <5* 10 - 30 ug/mL Final    Salicylate Lvl 32/57/8202 <1* 3 - 10 mg/dL Final    Specimen Description 04/08/2022 . NASOPHARYNGEAL SWAB   Final    SARS-CoV-2, Rapid 04/08/2022 Not Detected  Not Detected Final    Comment:       Rapid NAAT:  The specimen is NEGATIVE for SARS-CoV-2, the novel coronavirus associated with   COVID-19. The ID NOW COVID-19 assay is designed to detect the virus that causes COVID-19 in patients   with signs and symptoms of infection who are suspected of COVID-19. An individual without symptoms of COVID-19 and who is not shedding SARS-CoV-2 virus would   expect to have a negative (not detected) result in this assay. Negative results should be treated as presumptive and, if inconsistent with clinical signs   and symptoms or necessary for patient management,  should be tested with an alternative molecular assay. Negative results do not preclude   SARS-CoV-2 infection and   should not be used as the sole basis for patient management decisions.          Fact sheet for Healthcare Providers: Migdalia  Fact sheet for Patients: Shavon.es          Methodology: Isothermal Nucleic Acid Amplification           Reviewed patient's current plan of care and vital signs with nursing staff. Labs reviewed: [x] Yes  Last EKG in EMR reviewed: [x] Yes    Medications  Current Facility-Administered Medications: propranolol (INDERAL) tablet 10 mg, 10 mg, Oral, TID  ondansetron (ZOFRAN) injection 4 mg, 4 mg, IntraVENous, Once  haloperidol lactate (HALDOL) injection 5 mg, 5 mg, IntraMUSCular, Q6H PRN **AND** LORazepam (ATIVAN) injection 2 mg, 2 mg, IntraMUSCular, Q6H PRN **AND** diphenhydrAMINE (BENADRYL) injection 50 mg, 50 mg, IntraMUSCular, Q6H PRN  acetaminophen (TYLENOL) tablet 650 mg, 650 mg, Oral, Q6H PRN  ibuprofen (ADVIL;MOTRIN) tablet 400 mg, 400 mg, Oral, Q6H PRN  hydrOXYzine (ATARAX) tablet 50 mg, 50 mg, Oral, TID PRN  traZODone (DESYREL) tablet 50 mg, 50 mg, Oral, Nightly PRN  polyethylene glycol (GLYCOLAX) packet 17 g, 17 g, Oral, Daily PRN  aluminum & magnesium hydroxide-simethicone (MAALOX) 200-200-20 MG/5ML suspension 30 mL, 30 mL, Oral, Q6H PRN  nicotine polacrilex (NICORETTE) gum 2 mg, 2 mg, Oral, Q2H PRN  paliperidone (INVEGA) extended release tablet 3 mg, 3 mg, Oral, Daily    ASSESSMENT  Acute psychosis (HCC)    Recreational/illicit substance use. No UDS obtained. PLAN  Patient symptoms are: Remain unstable  Medication changes: Start propranolol 10 mg po TID  Initial loading dose of Invega Sustenna 234 mg IM injection administered on 4/9/2022  Encourage participation in groups and milieu. Attempt to develop insight  Psycho-education conducted. Supportive Therapy conducted. Probable discharge has yet to be determined      Electronically signed by LYNNE Arndt CNP on 4/10/2022 at 3:05 PM    **This report has been created using voice recognition software. It may contain minor errors which are inherent in voice recognition technology. **

## 2022-04-10 NOTE — GROUP NOTE
Group Therapy Note    Date: 4/9/2022    Group Start Time: 1000  Group End Time: 1300  Group Topic: Psychotherapy    [facility]    Aparna Moreau    Brief 1:1      [address] [place] [address] C/D    Ro[id number] and 11/17    PT participated in 1:1 individual brief therapy and engages in conversation regarding discharge and safety planning, and short- and long-term goals. PT able to identify all goals as well as starting to plan for discharge.     Patient's Goal:  To actively participate in 1:1 meeting and to start discharge and safety planning as well as given a journal.    Discipline Responsible: /Counselor      Signature:  GANGA Chew, LSW

## 2022-04-10 NOTE — GROUP NOTE
Group Therapy Note    Date: 4/10/2022    Group Start Time: 1400  Group End Time: 5822  Group Topic: Cognitive Skills    ROLA Krueger        Group Therapy Note    Attendees: 8/16         Patient's Goal:  To increase social interaction and to practice problem solving,  and communication skills. Notes: Pt particpated fully in group. Pt was able to practice problem solving, and communication skills         Status After Intervention:  Improved     Participation Level:  Active Listener, sharing, appropriate     Participation Quality: Appropriate, Sharing, Attentive, supportive        Speech:  Normal        Thought Process/Content: Logical, linear r/t task at intervals, some thought blocking     Affective Functioning: Congruent     Mood: Euthymic     Level of consciousness:  Alert, and Attentive        Response to Learning:  Able to express current knowledge, able to verbalize/acknowledge new learning, able to verbalize insight, and Progressing to goal        Endings: None Reported     Modes of Intervention: Education, Support, Socialization, Exploration, Clarifying and Problem-solving        Discipline Responsible: Psychoeducational Specialist        Signature:  ROLA Vu

## 2022-04-10 NOTE — PLAN OF CARE
Problem: Pain:  Goal: Control of acute pain  Description: Control of acute pain  4/10/2022 1752 by Narendra Riley RN  Outcome: Ongoing  Patient denies any pain. Problem: Depressive Behavior With or Without Suicide Precautions:  Goal: Able to verbalize acceptance of life and situations over which he or she has no control  Description: Able to verbalize acceptance of life and situations over which he or she has no control  4/10/2022 1752 by Narendra Riley RN  Outcome: Ongoing  Patient was able to discuss different situations that may arise that cause depressive symptoms. These life stressors are tough and the patient feels they have no control over them. Patient verbalized ways to deal with these stressors. Problem: Depressive Behavior With or Without Suicide Precautions:  Goal: Able to verbalize and/or display a decrease in depressive symptoms  Description: Able to verbalize and/or display a decrease in depressive symptoms  4/10/2022 1752 by Narendra Riley RN  Outcome: Ongoing  Patient states that she feels slightly depressed today. Patient states that after sleep she feels much better though. Patient is out in dayroom socializing and not quite as isolative. Improvement in symptoms shown. Q15 minute checks in place for patient safety. Problem: Depressive Behavior With or Without Suicide Precautions:  Goal: Ability to disclose and discuss suicidal ideas will improve  Description: Ability to disclose and discuss suicidal ideas will improve  4/10/2022 1752 by Narendra Riley RN  Outcome: Ongoing  Patient denies any suicidal ideas at this time. Patient was recently suicidal but states that these thoughts have improved. Patient contracts for safety. If thoughts of suicidal arise, patient will contact a staff member immediately. Q15 minute checks in place for patient safety. Patient remains safe at this time.      Problem: Tobacco Use:  Goal: Inpatient tobacco use cessation counseling participation  Description: Inpatient tobacco use cessation counseling participation  4/10/2022 1752 by Deborah Howard RN  Outcome: Ongoing  Patient denies any tobacco cessation at this time.

## 2022-04-11 PROCEDURE — 99232 SBSQ HOSP IP/OBS MODERATE 35: CPT | Performed by: PSYCHIATRY & NEUROLOGY

## 2022-04-11 PROCEDURE — 1240000000 HC EMOTIONAL WELLNESS R&B

## 2022-04-11 PROCEDURE — APPSS30 APP SPLIT SHARED TIME 16-30 MINUTES

## 2022-04-11 PROCEDURE — 6370000000 HC RX 637 (ALT 250 FOR IP): Performed by: PSYCHIATRY & NEUROLOGY

## 2022-04-11 RX ORDER — CHLORPROMAZINE HYDROCHLORIDE 25 MG/1
25 TABLET, FILM COATED ORAL 3 TIMES DAILY PRN
Status: DISCONTINUED | OUTPATIENT
Start: 2022-04-11 | End: 2022-04-14 | Stop reason: HOSPADM

## 2022-04-11 RX ADMIN — TRAZODONE HYDROCHLORIDE 50 MG: 50 TABLET ORAL at 21:56

## 2022-04-11 RX ADMIN — HYDROXYZINE HYDROCHLORIDE 50 MG: 50 TABLET, FILM COATED ORAL at 21:55

## 2022-04-11 RX ADMIN — NICOTINE POLACRILEX 2 MG: 2 GUM, CHEWING BUCCAL at 14:09

## 2022-04-11 RX ADMIN — NICOTINE POLACRILEX 2 MG: 2 GUM, CHEWING BUCCAL at 17:22

## 2022-04-11 RX ADMIN — PALIPERIDONE 3 MG: 3 TABLET, EXTENDED RELEASE ORAL at 08:22

## 2022-04-11 RX ADMIN — NICOTINE POLACRILEX 2 MG: 2 GUM, CHEWING BUCCAL at 10:39

## 2022-04-11 RX ADMIN — HYDROXYZINE HYDROCHLORIDE 50 MG: 50 TABLET, FILM COATED ORAL at 11:41

## 2022-04-11 RX ADMIN — NICOTINE POLACRILEX 2 MG: 2 GUM, CHEWING BUCCAL at 08:23

## 2022-04-11 ASSESSMENT — PAIN SCALES - GENERAL: PAINLEVEL_OUTOF10: 0

## 2022-04-11 NOTE — PLAN OF CARE
Problem: Pain:  Goal: Control of acute pain  Description: Control of acute pain  Outcome: Ongoing  Note: Patient denies pain upon assessment. Will monitor. Problem: Depressive Behavior With or Without Suicide Precautions:  Goal: Able to verbalize acceptance of life and situations over which he or she has no control  Description: Able to verbalize acceptance of life and situations over which he or she has no control  4/11/2022 1012 by Praful Curiel RN  Outcome: Ongoing     Problem: Depressive Behavior With or Without Suicide Precautions:  Goal: Able to verbalize and/or display a decrease in depressive symptoms  Description: Able to verbalize and/or display a decrease in depressive symptoms  4/11/2022 1012 by Praful Curiel RN  Outcome: Ongoing  Note: Patient alert and orient x 4. Speech clear. Patient presents this shift with flat affect and maintains good eye contact during 1;1 talk. Patient answers appropriately but responds with 1-2 words. Patient denies suicidal/homicidal/self harm ideations. Patient verbalizes continues to have auditory hallucinations but they are improving/only occasional. Patient is compliant with medications and in behavioral control. Patient is out in day room for long intervals, attends groups, and is social with select peers. Patient reports sleep/appetite are good. Patient encouraged to use healthy coping skills to mange life/situations over which she has no control. Support provided. Safety maintained with every 15 minute checks and as needed.       Problem: Depressive Behavior With or Without Suicide Precautions:  Goal: Ability to disclose and discuss suicidal ideas will improve  Description: Ability to disclose and discuss suicidal ideas will improve  Outcome: Ongoing     Problem: Tobacco Use:  Goal: Inpatient tobacco use cessation counseling participation  Description: Inpatient tobacco use cessation counseling participation  4/11/2022 1012 by Praful Curiel RN  Outcome: Ongoing  Note: Patient accepting of nicotine gum per doctor order to manage cravings. Will monitor.

## 2022-04-11 NOTE — GROUP NOTE
Group Therapy Note    Date: 4/11/2022    Group Start Time: 0920  Group End Time: 1000  Group Topic: Group Therapy    [facility]    Benjamin Cabot, RN        Group Therapy Note    Attendees: 8         Patient's Goal:  Pt refused to state her goals    Notes:      Status After Intervention:  Unchanged    Participation Level: Minimal    Participation Quality: Resistant      Speech:  hesitant      Thought Process/Content: Linear      Affective Functioning: Blunted and Flat      Mood: depressed      Level of consciousness:  Alert and Inattentive      Response to Learning: Resistant      Endings: None Reported    Modes of Intervention: Problem-solving      Discipline Responsible: Registered Nurse      Signature:  Benjamin Cabot, RN

## 2022-04-11 NOTE — BH NOTE
PRN follow up:   Patient verbalizes decrease in anxiety. Patient sitting out in day room. No sign of distress observed. Safety maintained. Will monitor.

## 2022-04-11 NOTE — GROUP NOTE
Group Therapy Note    Date: 4/11/2022    Group Start Time: 1430  Group End Time: 0702  Group Topic: Cognitive Skills    ROLA Jaffe        Group Therapy Note    Attendees: 7/14         Patient's Goal:  To increase social interaction and to practice self expression, expressing feelings and exploring positive coping skills, and positive thinking,resources, activities, and communication skills. Notes: Pt particpated fully in group. Pt was able to practice self expression, expressing feelings and exploring positive coping skills, and positive thinking,resources, activities, and communication skills through creative expression . Pt shared individually and also engaged in group discussion with peers and RT. Pt was supportive of peers, accepting of support from peers and able to relate to some of peers' ideas and experiences. Status After Intervention:  Improved     Participation Level:  Active Listener, sharing, appropriate     Participation Quality: Appropriate, Sharing, Attentive, supportive        Speech:  Normal        Thought Process/Content: Logical, linear, somewhat concrete     Affective Functioning: Congruent     Mood: Euthymic     Level of consciousness:  Alert, and Attentive        Response to Learning:  Able to express current knowledge, able to verbalize/acknowledge new learning, able to verbalize insight, and Progressing to goal        Endings: None Reported     Modes of Intervention: Education, Support, Socialization, Exploration, Clarifying and Problem-solving        Discipline Responsible: Psychoeducational Specialist        Signature:  ROLA Painting

## 2022-04-11 NOTE — GROUP NOTE
Group Therapy Note    Date: 4/11/2022    Group Start Time: 1100  Group End Time: 1150  Group Topic: Cognitive Skills    [facility]    ROLA Claros        Group Therapy Note    Attendees: 7/18         Patient's Goal:  To increase social interaction and to practice decision making,  and communication skills. Notes: Pt particpated fully in group. Pt was able to practice decision making, and communication skills -pt needed prompts x2 d/t preoccupied/staring into space briefly. Pt redirected back to task easily        Status After Intervention:  Improved     Participation Level:  Active Listener, sharing, appropriate     Participation Quality: Appropriate, Sharing, Attentive, supportive        Speech:  Normal        Thought Process/Content: Logical, linear - 2 brief intervals of staring into space/off task, redirected to task with minimal prompt     Affective Functioning: Blunted, brightens     Mood: Euthymic     Level of consciousness:  Alert, and Attentive        Response to Learning:  Able to express current knowledge, able to verbalize/acknowledge new learning, and Progressing to goal        Endings: None Reported     Modes of Intervention: Education, Support, Socialization, Exploration, Clarifying and Problem-solving        Discipline Responsible: Psychoeducational Specialist        Signature:  ROLA Hung

## 2022-04-11 NOTE — GROUP NOTE
Group Therapy Note    Date: 4/11/2022    Group Start Time: 1000  Group End Time: 1030  Group Topic: Psychotherapy    DELLA Allen        Group Therapy Note         Patient refused to attend psychotherapy group after encouragement from staff. 1:1 talk time offered but refused. Signature:   Dionicio Allen

## 2022-04-11 NOTE — BH NOTE
Patient refused scheduled propranolol stating \" I just want to take the Invega. It's working for me. \" Patient provided with medication education. Patient verbalized understanding but continued to decline medication at this time. Will monitor. Safety maintained.

## 2022-04-11 NOTE — PLAN OF CARE
Problem: Depressive Behavior With or Without Suicide Precautions:  Goal: Able to verbalize acceptance of life and situations over which he or she has no control  Description: Able to verbalize acceptance of life and situations over which he or she has no control  4/10/2022 2256 by Diane Quinn RN  Outcome: Ongoing     Problem: Depressive Behavior With or Without Suicide Precautions:  Goal: Able to verbalize and/or display a decrease in depressive symptoms  Description: Able to verbalize and/or display a decrease in depressive symptoms  4/10/2022 2256 by Diane Quinn RN  Outcome: Ongoing  Note: Patient is flat in appearance. She is medication compliant. She is selectively social in day room and uses phone to call supports. Patient is heard yelling out in room, patient reports that she \"just wants the voices go to away\". Patient reports anxiety and requested as needed atarax throughout the day to help with symptoms. She denies suicidal ideation and thoughts of self harm. Staff maintains Q 15 minute safety checks. Problem: Tobacco Use:  Goal: Inpatient tobacco use cessation counseling participation  Description: Inpatient tobacco use cessation counseling participation  4/10/2022 2256 by Diane Quinn RN  Outcome: Ongoing  Note: Patient given tobacco quitline number 28086144340 at this time, refusing to call at this time, states \" I just dont want to quit now\"- patient given information as to the dangers of long term tobacco use. Continue to reinforce the importance of tobacco cessation.

## 2022-04-11 NOTE — PROGRESS NOTES
Daily Progress Note  4/11/2022    Patient Name: Zonia Hahn: Depression with suicidal ideation and a plan with intent to stab self. Acute psychosis. SUBJECTIVE:     Patient seen face-to-face for follow-up assessment. Patient has been compliant with scheduled medications at this time and has not required emergency medications in the past 24 hours. Patient has been requesting as needed Atarax on the unit for anxiety reports that she does not want to try propanolol any longer and would like it discontinued. Patient states her anxiety is being adequately managed with as needed medication. Patient states that she is continuing to experience anxiety and depression however they have improved some today. Patient reports improvement in suicidal ideation however is unable to contract for safety outside of the hospital at this time. Patient continues to endorse auditory hallucinations and visual hallucinations. Patient states that they are distressing at times however have been improving. Patient denies medication side effects. Appetite:  [x] Normal/Adequate/Unchanged  [] Increased  [] Decreased      Sleep:       [x] Normal/Adequate/Unchanged  [] Fair  [] Poor      Group Attendance on Unit:   [] Yes  [x] Selectively    [] No    Medication Side Effects: Denies         Mental Status Exam  Level of consciousness: Alert and awake   Appearance: Appropriate attire for setting, standing, with poor grooming and hygiene, wearing bilateral boots secondary to foot fracture  Behavior/Motor: Approachable, restless  Attitude toward examiner: Cooperative, fair attention, good eye contact  Speech:  normal volume, rate, good articulation  Mood: Patient reports \"okay\"  Affect: Congruent, anxious at times  Thought processes: Coherent, goal-directed  Thought content: Denies homicidal ideation  Suicidal Ideation: Reports improving suicidal ideation, unable to contract for safety off unit.   Delusions: Paranoia  Perceptual Disturbance: Patient does not appear to be responding to internal stimuli. Denies auditory hallucinations. Denies visual hallucinations. Cognition: Oriented to self,, location, and time, and situation  memory: impaired   Insight & Judgement: Fair    Data   height is 5' 2\" (1.575 m) and weight is 133 lb (60.3 kg). Her temperature is 97.2 °F (36.2 °C). Her blood pressure is 96/58 (abnormal) and her pulse is 78. Her respiration is 17 and oxygen saturation is 99%.    Labs:   Admission on 04/08/2022   Component Date Value Ref Range Status    WBC 04/08/2022 8.8  4.5 - 13.5 k/uL Final    RBC 04/08/2022 4.40  4.0 - 5.2 m/uL Final    Hemoglobin 04/08/2022 12.9  12.0 - 16.0 g/dL Final    Hematocrit 04/08/2022 38.0  36 - 46 % Final    MCV 04/08/2022 86.4  80 - 100 fL Final    MCH 04/08/2022 29.4  26 - 34 pg Final    MCHC 04/08/2022 34.0  31 - 37 g/dL Final    RDW 04/08/2022 13.6  11.5 - 14.9 % Final    Platelets 25/64/5320 297  150 - 450 k/uL Final    MPV 04/08/2022 7.4  6.0 - 12.0 fL Final    Seg Neutrophils 04/08/2022 52  34 - 64 % Final    Lymphocytes 04/08/2022 35  25 - 45 % Final    Monocytes 04/08/2022 8  2 - 8 % Final    Eosinophils % 04/08/2022 4  0 - 4 % Final    Basophils 04/08/2022 1  0 - 2 % Final    Segs Absolute 04/08/2022 4.60  1.3 - 9.1 k/uL Final    Absolute Lymph # 04/08/2022 3.10  1.2 - 5.2 k/uL Final    Absolute Mono # 04/08/2022 0.70  0.1 - 1.3 k/uL Final    Absolute Eos # 04/08/2022 0.40  0.0 - 0.4 k/uL Final    Basophils Absolute 04/08/2022 0.10  0.0 - 0.2 k/uL Final    Glucose 04/08/2022 74  70 - 99 mg/dL Final    BUN 04/08/2022 6  6 - 20 mg/dL Final    CREATININE 04/08/2022 0.51  0.50 - 0.90 mg/dL Final    Calcium 04/08/2022 9.2  8.6 - 10.4 mg/dL Final    Sodium 04/08/2022 138  135 - 144 mmol/L Final    Potassium 04/08/2022 4.1  3.7 - 5.3 mmol/L Final    Chloride 04/08/2022 101  98 - 107 mmol/L Final    CO2 04/08/2022 25  20 - 31 mmol/L Final    Anion Gap 04/08/2022 12  9 - 17 mmol/L Final    Alkaline Phosphatase 04/08/2022 78  35 - 104 U/L Final    ALT 04/08/2022 9  5 - 33 U/L Final    AST 04/08/2022 11  <32 U/L Final    Total Bilirubin 04/08/2022 0.19* 0.3 - 1.2 mg/dL Final    Total Protein 04/08/2022 7.1  6.4 - 8.3 g/dL Final    Albumin 04/08/2022 4.6  3.5 - 5.2 g/dL Final    GFR Non- 04/08/2022 >60  >60 mL/min Final    GFR  04/08/2022 >60  >60 mL/min Final    GFR Comment 04/08/2022        Final    Comment: Average GFR for 20-28 years old:   116 mL/min/1.73sq m  Chronic Kidney Disease:   <60 mL/min/1.73sq m  Kidney failure:   <15 mL/min/1.73sq m              eGFR calculated using average adult body mass. Additional eGFR calculator available at:        [employer]            Lipase 04/08/2022 23  13 - 60 U/L Final    Color, UA 04/08/2022 Yellow  Yellow Final    Turbidity UA 04/08/2022 Clear  Clear Final    Glucose, Ur 04/08/2022 NEGATIVE  NEGATIVE Final    Bilirubin Urine 04/08/2022 NEGATIVE  NEGATIVE Final    Ketones, Urine 04/08/2022 NEGATIVE  NEGATIVE Final    Specific Gravity, UA 04/08/2022 1.003  1.000 - 1.030 Final    Urine Hgb 04/08/2022 NEGATIVE  NEGATIVE Final    pH, UA 04/08/2022 7.0  5.0 - 8.0 Final    Protein, UA 04/08/2022 NEGATIVE  NEGATIVE Final    Urobilinogen, Urine 04/08/2022 Normal  Normal Final    Nitrite, Urine 04/08/2022 NEGATIVE  NEGATIVE Final    Leukocyte Esterase, Urine 04/08/2022 NEGATIVE  NEGATIVE Final    WBC, UA 04/08/2022 0 TO 2  /HPF Final    RBC, UA 04/08/2022 0 TO 2  /HPF Final    Casts UA 04/08/2022 0 TO 2  /LPF Final    Epithelial Cells UA 04/08/2022 0 TO 2  /HPF Final    Bacteria, UA 04/08/2022 None  None Final    hCG Qual 04/08/2022 NEGATIVE  NEGATIVE Final    Comment: Specimens with hCG levels near the threshold of the test (25 mIU/mL) may give a negative or   indeterminate result.   In such cases, another test should be performed with a new specimen   in 48-72 hours. If early pregnancy is suspected clinically in this setting, correlation   with quantitative serum b-hCG level is suggested.  Ethanol 04/08/2022 <10  <10 mg/dL Final    Ethanol percent 04/08/2022 <0.010  % Final    Acetaminophen Level 04/08/2022 <5* 10 - 30 ug/mL Final    Salicylate Lvl 47/41/8105 <1* 3 - 10 mg/dL Final    Specimen Description 04/08/2022 . NASOPHARYNGEAL SWAB   Final    SARS-CoV-2, Rapid 04/08/2022 Not Detected  Not Detected Final    Comment:       Rapid NAAT:  The specimen is NEGATIVE for SARS-CoV-2, the novel coronavirus associated with   COVID-19. The ID NOW COVID-19 assay is designed to detect the virus that causes COVID-19 in patients   with signs and symptoms of infection who are suspected of COVID-19. An individual without symptoms of COVID-19 and who is not shedding SARS-CoV-2 virus would   expect to have a negative (not detected) result in this assay. Negative results should be treated as presumptive and, if inconsistent with clinical signs   and symptoms or necessary for patient management,  should be tested with an alternative molecular assay. Negative results do not preclude   SARS-CoV-2 infection and   should not be used as the sole basis for patient management decisions. Fact sheet for Healthcare Providers: [employer]ati  Fact sheet for Patients: Migdalia          Methodology: Isothermal Nucleic Acid Amplification           Reviewed patient's current plan of care and vital signs with nursing staff.     Labs reviewed: [x] Yes  Last EKG in EMR reviewed: [x] Yes    Medications  Current Facility-Administered Medications: propranolol (INDERAL) tablet 10 mg, 10 mg, Oral, TID  ondansetron (ZOFRAN) injection 4 mg, 4 mg, IntraVENous, Once  haloperidol lactate (HALDOL) injection 5 mg, 5 mg, IntraMUSCular, Q6H PRN **AND** LORazepam (ATIVAN) injection 2 mg, 2 mg, IntraMUSCular, Q6H PRN **AND** diphenhydrAMINE (BENADRYL) injection 50 mg, 50 mg, IntraMUSCular, Q6H PRN  acetaminophen (TYLENOL) tablet 650 mg, 650 mg, Oral, Q6H PRN  ibuprofen (ADVIL;MOTRIN) tablet 400 mg, 400 mg, Oral, Q6H PRN  hydrOXYzine (ATARAX) tablet 50 mg, 50 mg, Oral, TID PRN  traZODone (DESYREL) tablet 50 mg, 50 mg, Oral, Nightly PRN  polyethylene glycol (GLYCOLAX) packet 17 g, 17 g, Oral, Daily PRN  aluminum & magnesium hydroxide-simethicone (MAALOX) 200-200-20 MG/5ML suspension 30 mL, 30 mL, Oral, Q6H PRN  nicotine polacrilex (NICORETTE) gum 2 mg, 2 mg, Oral, Q2H PRN  paliperidone (INVEGA) extended release tablet 3 mg, 3 mg, Oral, Daily    ASSESSMENT  Acute psychosis ([address]    Recreational/illicit substance use. No UDS obtained. HANDOFF  Patient symptoms are:  Modest improvement  Medications as determined by attending physician     Encourage participation in groups and milieu. Probable discharge is to be determined by MD    Electronically signed by LYNNE Trejo CNP on 4/11/2022 at 3:58 PM    **This report has been created using voice recognition software. It may contain minor errors which are inherent in voice recognition technology. **    I independently saw and evaluated the patient. I reviewed the nurse practioner's documentation above. Any additional comments or changes to the    documentation are stated below otherwise agree with assessment. The patient was seen face-to-face. Ceci Hyde is reporting high levels of anxiety.  The patient does not find hydroxyzine effective.  We will start her on chlorpromazine on a as needed basis.  She continues to minimize a suicide attempt and minimizes symptoms. Ceci Hyde is discharged focused.  I have encouraged her to continue taking her medications which seem to be helping. PLAN  Propranolol dc'd  PRN Chlorpromazine ordered   Attempt to develop insight  Psycho-education conducted. Supportive Therapy conducted.   Probable discharge is 3-5

## 2022-04-11 NOTE — BH NOTE
PRN Note:  Patient verbalized feeling anxious. Patient offered 1:1 talk from staff, quiet time in room, and oral PRN Atarax 50 mg. Patient accepting of 1:1 talk with staff and oral PRN Atarax 50 mg to manage anxiety. Support provided. Safety maintained.

## 2022-04-12 PROCEDURE — 99232 SBSQ HOSP IP/OBS MODERATE 35: CPT | Performed by: PSYCHIATRY & NEUROLOGY

## 2022-04-12 PROCEDURE — 99232 SBSQ HOSP IP/OBS MODERATE 35: CPT | Performed by: INTERNAL MEDICINE

## 2022-04-12 PROCEDURE — 6370000000 HC RX 637 (ALT 250 FOR IP): Performed by: PSYCHIATRY & NEUROLOGY

## 2022-04-12 PROCEDURE — 1240000000 HC EMOTIONAL WELLNESS R&B

## 2022-04-12 PROCEDURE — APPSS30 APP SPLIT SHARED TIME 16-30 MINUTES

## 2022-04-12 RX ADMIN — NICOTINE POLACRILEX 2 MG: 2 GUM, CHEWING BUCCAL at 08:15

## 2022-04-12 RX ADMIN — PALIPERIDONE 3 MG: 3 TABLET, EXTENDED RELEASE ORAL at 08:14

## 2022-04-12 RX ADMIN — NICOTINE POLACRILEX 2 MG: 2 GUM, CHEWING BUCCAL at 12:12

## 2022-04-12 RX ADMIN — IBUPROFEN 400 MG: 400 TABLET ORAL at 11:24

## 2022-04-12 RX ADMIN — HYDROXYZINE HYDROCHLORIDE 50 MG: 50 TABLET, FILM COATED ORAL at 22:06

## 2022-04-12 RX ADMIN — TRAZODONE HYDROCHLORIDE 50 MG: 50 TABLET ORAL at 22:06

## 2022-04-12 RX ADMIN — HYDROXYZINE HYDROCHLORIDE 50 MG: 50 TABLET, FILM COATED ORAL at 08:14

## 2022-04-12 ASSESSMENT — PAIN SCALES - GENERAL: PAINLEVEL_OUTOF10: 4

## 2022-04-12 ASSESSMENT — PAIN DESCRIPTION - LOCATION: LOCATION: LEG

## 2022-04-12 ASSESSMENT — PAIN DESCRIPTION - ORIENTATION: ORIENTATION: RIGHT;LEFT

## 2022-04-12 ASSESSMENT — PAIN DESCRIPTION - PAIN TYPE: TYPE: CHRONIC PAIN

## 2022-04-12 NOTE — BH NOTE
PRN Follow Up Note:  Patient verbalizes decrease in anxiety. Patient sitting out in day room. No sign of distress observed. Will monitor. Safety maintained.

## 2022-04-12 NOTE — PLAN OF CARE
Problem: Depressive Behavior With or Without Suicide Precautions:  Goal: Able to verbalize acceptance of life and situations over which he or she has no control  Description: Able to verbalize acceptance of life and situations over which he or she has no control  4/11/2022 2045 by Gerry Verdugo RN  Outcome: Ongoing     Problem: Depressive Behavior With or Without Suicide Precautions:  Goal: Able to verbalize and/or display a decrease in depressive symptoms  Description: Able to verbalize and/or display a decrease in depressive symptoms  4/11/2022 2045 by Gerry Verdugo RN  Outcome: Ongoing  Note: Patient it out in day room social with select peers. She reports continued anxiety and auditory hallucinations. She denies suicidal ideation and thoughts of self harm. Staff maintains q 15 minute safety checks. Problem: Tobacco Use:  Goal: Inpatient tobacco use cessation counseling participation  Description: Inpatient tobacco use cessation counseling participation  4/11/2022 2045 by Gerry Verdugo RN  Outcome: Ongoing  Note: Patient given tobacco quitline number 94180479094 at this time, refusing to call at this time, states \" I just dont want to quit now\"- patient given information as to the dangers of long term tobacco use. Continue to reinforce the importance of tobacco cessation.

## 2022-04-12 NOTE — PLAN OF CARE
Problem: Pain:  Goal: Control of acute pain  Description: Control of acute pain  Note: Patient verbalized pain upon assessment to bilateral lower extremities. Patient wears walking boots for support and accepting of PRN Tylenol per doctor order to manage pain. Will monitor. Problem: Depressive Behavior With or Without Suicide Precautions:  Goal: Able to verbalize and/or display a decrease in depressive symptoms  Description: Able to verbalize and/or display a decrease in depressive symptoms  Note: Patient alert and orient x 4. Speech clear. Patient presents this shift with flat affect and makes good eye contact. Patient reports improvement in mood and denies hallucinations upon assessment. Patient denies suicidal/homicidal/self harm ideations. Patient is compliant with medication and in behavioral control. Patient is out in day room for long intervals, attends groups, and is social with peers. Patient reports sleep was good this past night, feels rested this morning. Appetite is good. Support provided. Safety maintained with every 15 minute checks and as needed. Problem: Tobacco Use:  Goal: Inpatient tobacco use cessation counseling participation  Description: Inpatient tobacco use cessation counseling participation  Note: Patient given tobacco quitline number 00884953757 at this time, refusing to call at this time, states \" I just dont want to quit now\"- patient given information as to the dangers of long term tobacco use. Continue to reinforce the importance of tobacco cessation. Insertion of Cordis catheter  08/08/2017    Active  AGAINES

## 2022-04-12 NOTE — GROUP NOTE
Group Therapy Note    Date: 4/12/2022    Group Start Time: 0900  Group End Time: 0930  Group Topic: Group Documentation    DELLA Nicole        Group Therapy Note    Attendees: 5/20         Patient's Goal: Talk to the doctor about d/c and manage pain in feet  Notes:  Goal setting group    Status After Intervention:  Improved    Participation Level:  Active Listener and Interactive    Participation Quality: Attentive and Sharing      Speech:  normal      Thought Process/Content: Logical      Affective Functioning: Congruent      Mood: anxious      Level of consciousness:  Alert, Oriented x4 and Attentive      Response to Learning: Able to verbalize current knowledge/experience      Endings: None Reported    Modes of Intervention: Education, Support, Exploration and Problem-solving      Discipline Responsible: [address] [employer]      Signature:  Karan Nicole

## 2022-04-12 NOTE — GROUP NOTE
Group Therapy Note    Date: 4/12/2022    Group Start Time: 1000  Group End Time: 1030  Group Topic: Psychotherapy    [facility]    Gen Tolentino        Group Therapy Note    Attendees: 5/20         Patient's Goal:  PT will demonstrate increased interpersonal interaction and a clear understanding on multiple types of coping skills relating to the here-and-now therapeutic practice. Notes:  Patient is making progress, AEB participating in group discussion, actively listening, and supporting other group members. PT participates in group and encourages others to participate     Status After Intervention:  Improved    Participation Level: Active Listener and Interactive    Participation Quality: Appropriate, Attentive and Sharing      Speech:  normal      Thought Process/Content: Logical      Affective Functioning: Flat      Mood: depressed      Level of consciousness:  Alert, Oriented x4 and Attentive      Response to Learning: Able to verbalize/acknowledge new learning and Progressing to goal      Endings: None Reported    Modes of Intervention: Support, Socialization, Exploration, Clarifying and Problem-solving      Discipline Responsible: /Counselor      Signature:   Gen Tolentino

## 2022-04-12 NOTE — GROUP NOTE
Group Therapy Note    Date: 4/12/2022    Group Start Time: 1500  Group End Time: 3968  Group Topic: Cognitive Skills    DELLA BHI D    ROLA Shi        Group Therapy Note    Attendees: 7/20         Patient's Goal:  To increase social interaction and to practice decision making,  and communication skills. Notes: Pt participated fully in group. Pt was able to practice decision making, and communication skills         Status After Intervention:  Improved     Participation Level:  Active Listener, sharing, appropriate     Participation Quality: Appropriate, Sharing, Attentive, supportive        Speech:  Normal        Thought Process/Content: Logical ,some thought blocking     Affective Functioning: Blunted      Mood: Euthymic, slightly lethargic but able to perform task     Level of consciousness:  Alert, and Attentive        Response to Learning:  Able to express current knowledge, able to verbalize/acknowledge new learning, and Progressing to goal        Endings: None Reported     Modes of Intervention: Education, Support, Socialization, Exploration, Clarifying and Problem-solving        Discipline Responsible: Psychoeducational Specialist        Signature:  ROLA Powers
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
## 2022-04-12 NOTE — H&P
[facility] Internal Medicine    CONSULTATION / HISTORY AND PHYSICAL EXAMINATION            Date:   4/12/2022  Patient name:  Mike Smith  Date of admission:  4/8/2022 11:34 AM  MRN:   [id number]  Account:  [de-identified]  YOB: 2000  PCP:    Mercedes Banegas MD  Room:   0222/9793-01  Code Status:    Full Code    Physician Requesting Consult: Zayda Anthony MD    Reason for Consult:  medical management    Chief Complaint:     Chief Complaint   Patient presents with    Emesis    Suicidal    n v      History Obtained From:     Patient medical record nursing staff    History of Present Illness:     53-year-old lady admitted for depression and anxiety prior suicide attempt jump from a high rise bilateral foot fractures now able to walk  Chronic nausea vomiting but no significant weight loss patient denies any street drug abuse no fever chills no rash no joint pain has not had any GI work-up in her outpatient    Past Medical History:     Past Medical History:   Diagnosis Date    ADHD (attention deficit hyperactivity disorder)     Anxiety     Depression         Past Surgical History:     Past Surgical History:   Procedure Laterality Date    ADENOIDECTOMY      FOOT FRACTURE SURGERY Bilateral 12/16/2021    LEFT CALCANEOUS OPEN REDUCTION INTERNAL FIXATION AND RIGHT FOOT FRACTURES OPEN REDUCTION INTERNAL FIXATION performed by Ethan Rico MD at [facility]  2003        Medications Prior to Admission:     Prior to Admission medications    Medication Sig Start Date End Date Taking? Authorizing Provider   risperiDONE (RISPERDAL) 2 MG tablet Take 2 mg by mouth 2 times daily  3/1/22   Historical Provider, MD   hydrOXYzine (ATARAX) 25 MG tablet Take 25 mg by mouth every 8 hours as needed for Anxiety  3/3/22   Historical Provider, MD        Allergies:     Patient has no known allergies. Social History:     Tobacco:    reports that she has quit smoking.  She has never used smokeless tobacco.  Alcohol:      reports current alcohol use. Drug Use:  reports previous drug use. Family History:     History reviewed. No pertinent family history. Review of Systems:     Positive and Negative as described in HPI. CONSTITUTIONAL:  negative for fevers, chills, sweats, fatigue, weight loss  HEENT:  negative for vision, hearing changes, runny nose, throat pain  RESPIRATORY:  negative for shortness of breath, cough, congestion, wheezing. CARDIOVASCULAR:  negative for chest pain, palpitations. GASTROINTESTINAL: Chronic intermittent nausea vomiting  GENITOURINARY:  negative for difficulty of urination, burning with urination, frequency   INTEGUMENT:  negative for rash, skin lesions, easy bruising   HEMATOLOGIC/LYMPHATIC:  negative for swelling/edema   ALLERGIC/IMMUNOLOGIC:  negative for urticaria , itching  ENDOCRINE:  negative increase in drinking, increase in urination, hot or cold intolerance  MUSCULOSKELETAL:  negative joint pains, muscle aches, swelling of joints  NEUROLOGICAL:  negative for headaches, dizziness, lightheadedness, numbness, pain, tingling extremities    Physical Exam:     /61   Pulse 82   Temp 97.4 °F (36.3 °C) (Oral)   Resp 14   Ht 5' 2\" (1.575 m)   Wt 133 lb (60.3 kg)   SpO2 99%   BMI 24.33 kg/m²   Temp (24hrs), Av.4 °F (36.3 °C), Min:97.3 °F (36.3 °C), Max:97.4 °F (36.3 °C)    No results for input(s): POCGLU in the last 72 hours. No intake or output data in the 24 hours ending 22 1803    General Appearance:  alert, well appearing, and in no acute distress  Mental status: oriented to person, place, and time with normal affect  Head:  normocephalic, atraumatic.   Eye: no icterus, redness, pupils equal and reactive, extraocular eye movements intact, conjunctiva clear  Ear: normal external ear, no discharge, hearing intact  Nose:  no drainage noted  Mouth: mucous membranes moist  Neck: supple, no carotid bruits, thyroid not palpable  Lungs: Bilateral equal air entry, clear to ausculation, no wheezing, rales or rhonchi, normal effort  Cardiovascular: normal rate, regular rhythm, no murmur, gallop, rub. Abdomen: Soft, nontender, nondistended, normal bowel sounds, no hepatomegaly or splenomegaly  Neurologic: There are no new focal motor or sensory deficits, normal muscle tone and bulk, no abnormal sensation, normal speech, cranial nerves II through XII grossly intact  Skin: No gross lesions, rashes, bruising or bleeding on exposed skin area  Extremities:  peripheral pulses palpable, no pedal edema or calf pain with palpation  Psych: Flat affect    Investigations:      Laboratory Testing:  No results found for this or any previous visit (from the past 24 hour(s)). Consultations:   IP CONSULT TO INTERNAL MEDICINE  Assessment :      Primary Problem  Acute psychosis [facility])    Active Hospital Problems    Diagnosis Date Noted    Suicidal ideation [R45.851]     Acute psychosis ([facility] [address] [F23] 12/09/2021       Plan:     Chronic intermittent nausea vomiting will need outpatient EGD and gastric emptying study  History of suicide attempt with jump from a high rise both foot fractures now healing able to walk without cane or support  Labs and vitals reviewed      4/12/22    Vitals:    04/10/22 1952 04/11/22 0800 04/11/22 2015 04/12/22 0730   BP: 132/84 (!) 96/58 (!) 133/93 114/61   Pulse: 88 78 110 82   Resp: 14 17 14 14   Temp: 97.8 °F (36.6 °C) 97.2 °F (36.2 °C) 97.3 °F (36.3 °C) 97.4 °F (36.3 °C)   TempSrc: Temporal  Oral Oral   SpO2:  99%     Weight:       Height:     ·     ·    .     URINE ANALYSIS: No results found for: LABURIN     CBC:  Lab Results   Component Value Date    WBC 8.8 04/08/2022    HGB 12.9 04/08/2022     04/08/2022        BMP:    Lab Results   Component Value Date     04/08/2022    K 4.1 04/08/2022     04/08/2022    CO2 25 04/08/2022    BUN 6 04/08/2022    CREATININE 0.51 04/08/2022    GLUCOSE 74 04/08/2022      LIVER PROFILE:  Lab Results   Component Value Date    ALT 9 04/08/2022    AST 11 04/08/2022    PROT 7.1 04/08/2022    BILITOT 0.19 04/08/2022    LABALBU 4.6 04/08/2022      1.     2. Will monitor   3. stable            Anuja Thomas MD  4/12/2022  6:03 PM    Copy sent to Dr. Tigre Haddad MD    Please note that this chart was generated using voice recognition Dragon dictation software. Although every effort was made to ensure the accuracy of this automated transcription, some errors in transcription may have occurred.

## 2022-04-12 NOTE — PROGRESS NOTES
Daily Progress Note  4/12/2022    Patient Name: Ishmael Sers: Depression with suicidal ideation and a plan with intent to stab self. Acute psychosis. SUBJECTIVE:     Patient seen face-to-face for follow-up assessment. Patient has been compliant with scheduled medications at this time and has not required emergency medications in the past 24 hours. When approached for interview patient presents as bizarre with blunted affect and responding to internal stimuli. Patient's voice is monotone and her behavior towards staff is consistent and minimizing. Patient continues to endorse depression and anxiety and relates this to \"thoughts in my head\". Patient reports that she is currently feeling suicidal and is unsure why. Patient continues to minimize suicide attempt prior to admission. Patient denies auditory and visual hallucinations however reports that she is feeling paranoid and began looking around the room. Patient states that she will not discuss reasons for paranoia with staff. Patient denies medication side effects at this time. Nursing staff reports that patient was stating she needs Percocet earlier in the day. Nursing staff reports patient continues to present as bizarre on unit. Patient is unable to meaningfully contract for safety outside hospital at this time, and would benefit from further inpatient hospitalization for stabilization and safety.         Appetite:  [x] Normal/Adequate/Unchanged  [] Increased  [] Decreased      Sleep:       [x] Normal/Adequate/Unchanged  [] Fair  [] Poor      Group Attendance on Unit:   [x] Yes  [] Selectively    [] No    Medication Side Effects: Denies         Mental Status Exam  Level of consciousness: Alert and awake   Appearance: Appropriate attire for setting, seated in chair, with fair  grooming and hygiene, wearing bilateral boots secondary to foot fracture  Behavior/Motor: Approachable, restless, distant  Attitude toward examiner: Cooperative, fair attention, fair eye contact,  Speech:  normal volume, rate, good articulation  Mood: Patient reports \"okay\"  Affect: Blunted, anxious  Thought processes: Coherent, minimizing  Thought content: Denies homicidal ideation  Suicidal Ideation: Endorses suicidal ideation, unable to contract for safety off unit. Delusions: Paranoia  Perceptual Disturbance: Patient does not appear to be responding to internal stimuli. Denies auditory hallucinations. Denies visual hallucinations. Cognition: Oriented to self,, location, and time, and situation  memory: Intact  Insight & Judgement: Poor    Data   height is 5' 2\" (1.575 m) and weight is 133 lb (60.3 kg). Her oral temperature is 97.4 °F (36.3 °C). Her blood pressure is 114/61 and her pulse is 82. Her respiration is 14 and oxygen saturation is 99%.    Labs:   Admission on 04/08/2022   Component Date Value Ref Range Status    WBC 04/08/2022 8.8  4.5 - 13.5 k/uL Final    RBC 04/08/2022 4.40  4.0 - 5.2 m/uL Final    Hemoglobin 04/08/2022 12.9  12.0 - 16.0 g/dL Final    Hematocrit 04/08/2022 38.0  36 - 46 % Final    MCV 04/08/2022 86.4  80 - 100 fL Final    MCH 04/08/2022 29.4  26 - 34 pg Final    MCHC 04/08/2022 34.0  31 - 37 g/dL Final    RDW 04/08/2022 13.6  11.5 - 14.9 % Final    Platelets 09/14/1626 297  150 - 450 k/uL Final    MPV 04/08/2022 7.4  6.0 - 12.0 fL Final    Seg Neutrophils 04/08/2022 52  34 - 64 % Final    Lymphocytes 04/08/2022 35  25 - 45 % Final    Monocytes 04/08/2022 8  2 - 8 % Final    Eosinophils % 04/08/2022 4  0 - 4 % Final    Basophils 04/08/2022 1  0 - 2 % Final    Segs Absolute 04/08/2022 4.60  1.3 - 9.1 k/uL Final    Absolute Lymph # 04/08/2022 3.10  1.2 - 5.2 k/uL Final    Absolute Mono # 04/08/2022 0.70  0.1 - 1.3 k/uL Final    Absolute Eos # 04/08/2022 0.40  0.0 - 0.4 k/uL Final    Basophils Absolute 04/08/2022 0.10  0.0 - 0.2 k/uL Final    Glucose 04/08/2022 74  70 - 99 mg/dL Final    BUN 04/08/2022 6  6 - 20 mg/dL Final    CREATININE 04/08/2022 0.51  0.50 - 0.90 mg/dL Final    Calcium 04/08/2022 9.2  8.6 - 10.4 mg/dL Final    Sodium 04/08/2022 138  135 - 144 mmol/L Final    Potassium 04/08/2022 4.1  3.7 - 5.3 mmol/L Final    Chloride 04/08/2022 101  98 - 107 mmol/L Final    CO2 04/08/2022 25  20 - 31 mmol/L Final    Anion Gap 04/08/2022 12  9 - 17 mmol/L Final    Alkaline Phosphatase 04/08/2022 78  35 - 104 U/L Final    ALT 04/08/2022 9  5 - 33 U/L Final    AST 04/08/2022 11  <32 U/L Final    Total Bilirubin 04/08/2022 0.19* 0.3 - 1.2 mg/dL Final    Total Protein 04/08/2022 7.1  6.4 - 8.3 g/dL Final    Albumin 04/08/2022 4.6  3.5 - 5.2 g/dL Final    GFR Non- 04/08/2022 >60  >60 mL/min Final    GFR  04/08/2022 >60  >60 mL/min Final    GFR Comment 04/08/2022        Final    Comment: Average GFR for 20-28 years old:   116 mL/min/1.73sq m  Chronic Kidney Disease:   <60 mL/min/1.73sq m  Kidney failure:   <15 mL/min/1.73sq m              eGFR calculated using average adult body mass.  Additional eGFR calculator available at:        [employer]            Lipase 04/08/2022 23  13 - 60 U/L Final    Color, UA 04/08/2022 Yellow  Yellow Final    Turbidity UA 04/08/2022 Clear  Clear Final    Glucose, Ur 04/08/2022 NEGATIVE  NEGATIVE Final    Bilirubin Urine 04/08/2022 NEGATIVE  NEGATIVE Final    Ketones, Urine 04/08/2022 NEGATIVE  NEGATIVE Final    Specific Gravity, UA 04/08/2022 1.003  1.000 - 1.030 Final    Urine Hgb 04/08/2022 NEGATIVE  NEGATIVE Final    pH, UA 04/08/2022 7.0  5.0 - 8.0 Final    Protein, UA 04/08/2022 NEGATIVE  NEGATIVE Final    Urobilinogen, Urine 04/08/2022 Normal  Normal Final    Nitrite, Urine 04/08/2022 NEGATIVE  NEGATIVE Final    Leukocyte Esterase, Urine 04/08/2022 NEGATIVE  NEGATIVE Final    WBC, UA 04/08/2022 0 TO 2  /HPF Final    RBC, UA 04/08/2022 0 TO 2  /HPF Final    Casts UA 04/08/2022 0 TO 2  /LPF Final    Epithelial Cells UA 04/08/2022 0 TO 2  /HPF Final    Bacteria, UA 04/08/2022 None  None Final    hCG Qual 04/08/2022 NEGATIVE  NEGATIVE Final    Comment: Specimens with hCG levels near the threshold of the test (25 mIU/mL) may give a negative or   indeterminate result. In such cases, another test should be performed with a new specimen   in 48-72 hours. If early pregnancy is suspected clinically in this setting, correlation   with quantitative serum b-hCG level is suggested.  Ethanol 04/08/2022 <10  <10 mg/dL Final    Ethanol percent 04/08/2022 <0.010  % Final    Acetaminophen Level 04/08/2022 <5* 10 - 30 ug/mL Final    Salicylate Lvl 97/85/1649 <1* 3 - 10 mg/dL Final    Specimen Description 04/08/2022 . NASOPHARYNGEAL SWAB   Final    SARS-CoV-2, Rapid 04/08/2022 Not Detected  Not Detected Final    Comment:       Rapid NAAT:  The specimen is NEGATIVE for SARS-CoV-2, the novel coronavirus associated with   COVID-19. The ID NOW COVID-19 assay is designed to detect the virus that causes COVID-19 in patients   with signs and symptoms of infection who are suspected of COVID-19. An individual without symptoms of COVID-19 and who is not shedding SARS-CoV-2 virus would   expect to have a negative (not detected) result in this assay. Negative results should be treated as presumptive and, if inconsistent with clinical signs   and symptoms or necessary for patient management,  should be tested with an alternative molecular assay. Negative results do not preclude   SARS-CoV-2 infection and   should not be used as the sole basis for patient management decisions. Fact sheet for Healthcare Providers: [employer]  Fact sheet for Patients: BuildHer.es          Methodology: Isothermal Nucleic Acid Amplification           Reviewed patient's current plan of care and vital signs with nursing staff.     Labs reviewed: [x] Yes  Last EKG in EMR reviewed: [x] Yes    Medications  Current Facility-Administered Medications: paliperidone palmitate ER (INVEGA SUSTENNA) IM injection 156 mg, 156 mg, IntraMUSCular, Once  chlorproMAZINE (THORAZINE) tablet 25 mg, 25 mg, Oral, TID PRN  ondansetron (ZOFRAN) injection 4 mg, 4 mg, IntraVENous, Once  haloperidol lactate (HALDOL) injection 5 mg, 5 mg, IntraMUSCular, Q6H PRN **AND** LORazepam (ATIVAN) injection 2 mg, 2 mg, IntraMUSCular, Q6H PRN **AND** diphenhydrAMINE (BENADRYL) injection 50 mg, 50 mg, IntraMUSCular, Q6H PRN  acetaminophen (TYLENOL) tablet 650 mg, 650 mg, Oral, Q6H PRN  ibuprofen (ADVIL;MOTRIN) tablet 400 mg, 400 mg, Oral, Q6H PRN  hydrOXYzine (ATARAX) tablet 50 mg, 50 mg, Oral, TID PRN  traZODone (DESYREL) tablet 50 mg, 50 mg, Oral, Nightly PRN  polyethylene glycol (GLYCOLAX) packet 17 g, 17 g, Oral, Daily PRN  aluminum & magnesium hydroxide-simethicone (MAALOX) 200-200-20 MG/5ML suspension 30 mL, 30 mL, Oral, Q6H PRN  nicotine polacrilex (NICORETTE) gum 2 mg, 2 mg, Oral, Q2H PRN  paliperidone (INVEGA) extended release tablet 3 mg, 3 mg, Oral, Daily    ASSESSMENT  Acute psychosis ([facility] [address]    Recreational/illicit substance use. No UDS obtained. HANDOFF  Patient symptoms are:  Remains unstable  Medications as determined by attending physician  Encourage participation in groups and milieu. Probable discharge is to be determined by MD    Electronically signed by LYNNE Stevens CNP on 4/12/2022 at 4:17 PM    **This report has been created using voice recognition software. It may contain minor errors which are inherent in voice recognition technology. **    I independently saw and evaluated the patient. I reviewed the  documentation above. Any additional comments or changes to the midlevel provider's documentation are stated below otherwise agree with assessment. The patient is discharged focused. She minimizes all symptoms. Her mood is euthymic.   She is indifferent about her foot injuries and denies any suicidal ideation. The patient firmly refuses a long-acting injection and states she will continue taking her medication and have follow-up care. PLAN  Medications as noted above  Attempt to develop insight  Psycho-education conducted. Supportive Therapy conducted.   Probable discharge is 2-6 days  Follow-up daily while on inpatient unit    Electronically signed by Zaira Grey MD on 4/12/22 at 7:22 PM EDT

## 2022-04-12 NOTE — BH NOTE
PRN Note:  Patient verbalized feeling anxious. Patient offered 1:1 talk, quiet time in room, and oral PRN Atarax 50 mg to mange anxiety. Patient accepting of 1:1 talk with writer and oral PRN Atarax 50 mg. Support provided. Safety maintained.

## 2022-04-12 NOTE — PLAN OF CARE
Problem: Depressive Behavior With or Without Suicide Precautions:  Goal: Able to verbalize and/or display a decrease in depressive symptoms  Description: Able to verbalize and/or display a decrease in depressive symptoms  4/11/2022 2055 by Ezra Street  Outcome: Ongoing     Problem: Depressive Behavior With or Without Suicide Precautions:  Goal: Ability to disclose and discuss suicidal ideas will improve  Description: Ability to disclose and discuss suicidal ideas will improve  4/11/2022 2055 by Ezra Street  Outcome: Ongoing   Patient denies suicidal ideations this evening. Patient also denies any hallucinations and denies depression and anxiety. Patient has been calm, cooperative at this time. Q 15 minutes safety checks maintained.

## 2022-04-13 PROCEDURE — 1240000000 HC EMOTIONAL WELLNESS R&B

## 2022-04-13 PROCEDURE — 6370000000 HC RX 637 (ALT 250 FOR IP): Performed by: PSYCHIATRY & NEUROLOGY

## 2022-04-13 PROCEDURE — 99231 SBSQ HOSP IP/OBS SF/LOW 25: CPT | Performed by: INTERNAL MEDICINE

## 2022-04-13 PROCEDURE — 99232 SBSQ HOSP IP/OBS MODERATE 35: CPT | Performed by: PSYCHIATRY & NEUROLOGY

## 2022-04-13 PROCEDURE — APPSS30 APP SPLIT SHARED TIME 16-30 MINUTES

## 2022-04-13 RX ADMIN — NICOTINE POLACRILEX 2 MG: 2 GUM, CHEWING BUCCAL at 15:30

## 2022-04-13 RX ADMIN — NICOTINE POLACRILEX 2 MG: 2 GUM, CHEWING BUCCAL at 08:52

## 2022-04-13 RX ADMIN — ACETAMINOPHEN 650 MG: 325 TABLET ORAL at 19:08

## 2022-04-13 RX ADMIN — PALIPERIDONE 3 MG: 3 TABLET, EXTENDED RELEASE ORAL at 08:51

## 2022-04-13 RX ADMIN — TRAZODONE HYDROCHLORIDE 50 MG: 50 TABLET ORAL at 20:59

## 2022-04-13 RX ADMIN — NICOTINE POLACRILEX 2 MG: 2 GUM, CHEWING BUCCAL at 19:08

## 2022-04-13 RX ADMIN — HYDROXYZINE HYDROCHLORIDE 50 MG: 50 TABLET, FILM COATED ORAL at 20:59

## 2022-04-13 ASSESSMENT — PAIN SCALES - GENERAL
PAINLEVEL_OUTOF10: 2
PAINLEVEL_OUTOF10: 5

## 2022-04-13 NOTE — PLAN OF CARE
Problem: Pain:  Goal: Control of acute pain  Description: Control of acute pain  4/12/2022 2211 by Cachorro Rodriguez RN  Outcome: Ongoing  Note: Patient denies pain on assessment. Problem: Depressive Behavior With or Without Suicide Precautions:  Goal: Able to verbalize and/or display a decrease in depressive symptoms  Description: Able to verbalize and/or display a decrease in depressive symptoms  4/12/2022 2211 by Cachorro Rodriguez RN  Outcome: Ongoing  Note: Patient is out in day room social with select peers and calling supports on phone. Patient reports anxiety and requested as needed atarax as it has been helpful. Patient also used trazodone for nightly for sleep. Staff maintains Q15 minute safety checks. Problem: Tobacco Use:  Goal: Inpatient tobacco use cessation counseling participation  Description: Inpatient tobacco use cessation counseling participation  4/12/2022 2211 by Cachorro Rodriguez RN  Outcome: Ongoing  Note: Patient given tobacco quitline number 08733300625 at this time, refusing to call at this time, states \" I just dont want to quit now\"- patient given information as to the dangers of long term tobacco use. Continue to reinforce the importance of tobacco cessation.

## 2022-04-13 NOTE — PLAN OF CARE
Problem: Depressive Behavior With or Without Suicide Precautions:  Goal: Ability to disclose and discuss suicidal ideas will improve  Description: Ability to disclose and discuss suicidal ideas will improve  Outcome: Ongoing  Note: Patient denies thoughts of self harm or hallucinations. Out for meals and watching tv in dayroom.   Behavior controlled

## 2022-04-13 NOTE — GROUP NOTE
Group Therapy Note    Date: 4/13/2022    Group Start Time: 1000  Group End Time: 1030  Group Topic: Psychotherapy    [facility]    Carlos Ross        Group Therapy Note    Attendees: 10/23         Patient's Goal:  PT will demonstrate increased interpersonal interaction and a clear understanding on multiple types of coping skills relating to the here-and-now therapeutic practice. Notes:  Patient is making progress, AEB participating in group discussion, actively listening, and supporting other group members. PT participates in group and encourages others to participate     Status After Intervention:  Improved    Participation Level: Active Listener and Interactive    Participation Quality: Appropriate, Attentive and Sharing      Speech:  normal      Thought Process/Content: Logical      Affective Functioning: Flat      Mood: anxious      Level of consciousness:  Alert, Oriented x4 and Attentive      Response to Learning: Able to verbalize/acknowledge new learning and Progressing to goal      Endings: None Reported    Modes of Intervention: Support, Socialization, Exploration, Clarifying and Problem-solving      Discipline Responsible: /Counselor      Signature:   Carlos Ross

## 2022-04-13 NOTE — PROGRESS NOTES
[facility] Internal Medicine    CONSULTATION / HISTORY AND PHYSICAL EXAMINATION            Date:   4/13/2022  Patient name:  Smita Butler  Date of admission:  4/8/2022 11:34 AM  MRN:   [id number]  Account:  [de-identified]  YOB: 2000  PCP:    Alejandra Medina MD  Room:   8917/1784-73  Code Status:    Full Code    Physician Requesting Consult: Lisette Zabala MD    Reason for Consult:  medical management    Chief Complaint:     Chief Complaint   Patient presents with    Emesis    Suicidal    n v      History Obtained From:     Patient medical record nursing staff    History of Present Illness:     63-year-old lady admitted for depression and anxiety prior suicide attempt jump from a high rise bilateral foot fractures now able to walk  Chronic nausea vomiting but no significant weight loss patient denies any street drug abuse no fever chills no rash no joint pain has not had any GI work-up in her outpatient    Past Medical History:     Past Medical History:   Diagnosis Date    ADHD (attention deficit hyperactivity disorder)     Anxiety     Depression         Past Surgical History:     Past Surgical History:   Procedure Laterality Date    ADENOIDECTOMY      FOOT FRACTURE SURGERY Bilateral 12/16/2021    LEFT CALCANEOUS OPEN REDUCTION INTERNAL FIXATION AND RIGHT FOOT FRACTURES OPEN REDUCTION INTERNAL FIXATION performed by Malia Allison MD at [address] [place] [address]  2003        Medications Prior to Admission:     Prior to Admission medications    Medication Sig Start Date End Date Taking? Authorizing Provider   risperiDONE (RISPERDAL) 2 MG tablet Take 2 mg by mouth 2 times daily  3/1/22   Historical Provider, MD   hydrOXYzine (ATARAX) 25 MG tablet Take 25 mg by mouth every 8 hours as needed for Anxiety  3/3/22   Historical Provider, MD        Allergies:     Patient has no known allergies. Social History:     Tobacco:    reports that she has quit smoking.  She has never used smokeless tobacco.  Alcohol:      reports current alcohol use. Drug Use:  reports previous drug use. Family History:     History reviewed. No pertinent family history. Review of Systems:     Positive and Negative as described in HPI. CONSTITUTIONAL:  negative for fevers, chills, sweats, fatigue, weight loss  HEENT:  negative for vision, hearing changes, runny nose, throat pain  RESPIRATORY:  negative for shortness of breath, cough, congestion, wheezing. CARDIOVASCULAR:  negative for chest pain, palpitations. GASTROINTESTINAL: Chronic intermittent nausea vomiting  GENITOURINARY:  negative for difficulty of urination, burning with urination, frequency   INTEGUMENT:  negative for rash, skin lesions, easy bruising   HEMATOLOGIC/LYMPHATIC:  negative for swelling/edema   ALLERGIC/IMMUNOLOGIC:  negative for urticaria , itching  ENDOCRINE:  negative increase in drinking, increase in urination, hot or cold intolerance  MUSCULOSKELETAL:  negative joint pains, muscle aches, swelling of joints  NEUROLOGICAL:  negative for headaches, dizziness, lightheadedness, numbness, pain, tingling extremities    Physical Exam:     /73   Pulse 98   Temp 98.2 °F (36.8 °C)   Resp 14   Ht 5' 2\" (1.575 m)   Wt 133 lb (60.3 kg)   SpO2 99%   BMI 24.33 kg/m²   Temp (24hrs), Av.2 °F (36.8 °C), Min:98.2 °F (36.8 °C), Max:98.2 °F (36.8 °C)    No results for input(s): POCGLU in the last 72 hours. No intake or output data in the 24 hours ending 22 1625    General Appearance:  alert, well appearing, and in no acute distress  Mental status: oriented to person, place, and time with normal affect  Head:  normocephalic, atraumatic.   Eye: no icterus, redness, pupils equal and reactive, extraocular eye movements intact, conjunctiva clear  Ear: normal external ear, no discharge, hearing intact  Nose:  no drainage noted  Mouth: mucous membranes moist  Neck: supple, no carotid bruits, thyroid not palpable  Lungs: Bilateral equal air entry, clear to ausculation, no wheezing, rales or rhonchi, normal effort  Cardiovascular: normal rate, regular rhythm, no murmur, gallop, rub. Abdomen: Soft, nontender, nondistended, normal bowel sounds, no hepatomegaly or splenomegaly  Neurologic: There are no new focal motor or sensory deficits, normal muscle tone and bulk, no abnormal sensation, normal speech, cranial nerves II through XII grossly intact  Skin: No gross lesions, rashes, bruising or bleeding on exposed skin area  Extremities:  peripheral pulses palpable, no pedal edema or calf pain with palpation  Psych: Flat affect    Investigations:      Laboratory Testing:  No results found for this or any previous visit (from the past 24 hour(s)). Consultations:   IP CONSULT TO INTERNAL MEDICINE  Assessment :      Primary Problem  Acute psychosis [facility])    Active Hospital Problems    Diagnosis Date Noted    Suicidal ideation [R45.851]     Acute psychosis ([facility] [place] [address] [F23] 12/09/2021       Plan:     Chronic intermittent nausea vomiting will need outpatient EGD and gastric emptying study  History of suicide attempt with jump from a high rise both foot fractures now healing able to walk without cane or support  Labs and vitals reviewed      4/12/22    Vitals:    04/11/22 0800 04/11/22 2015 04/12/22 0730 04/12/22 2107   BP: (!) 96/58 (!) 133/93 114/61 127/73   Pulse: 78 110 82 98   Resp: 17 14 14 14   Temp: 97.2 °F (36.2 °C) 97.3 °F (36.3 °C) 97.4 °F (36.3 °C) 98.2 °F (36.8 °C)   TempSrc:  Oral Oral    SpO2: 99%      Weight:       Height:         ·    .     URINE ANALYSIS: No results found for: LABURIN     CBC:  Lab Results   Component Value Date    WBC 8.8 04/08/2022    HGB 12.9 04/08/2022     04/08/2022        BMP:    Lab Results   Component Value Date     04/08/2022    K 4.1 04/08/2022     04/08/2022    CO2 25 04/08/2022    BUN 6 04/08/2022    CREATININE 0.51 04/08/2022    GLUCOSE 74 04/08/2022      LIVER PROFILE:  Lab Results   Component Value Date    ALT 9 04/08/2022    AST 11 04/08/2022    PROT 7.1 04/08/2022    BILITOT 0.19 04/08/2022    LABALBU 4.6 04/08/2022      1.     2. Will monitor   3. stable          4/13/22    Will sign off . Thanks . · Please call again , if neeeded . 4. Trena Calderón MD  4/13/2022  4:25 PM    Copy sent to Dr. Dawson Younger MD    Please note that this chart was generated using voice recognition Dragon dictation software. Although every effort was made to ensure the accuracy of this automated transcription, some errors in transcription may have occurred.

## 2022-04-13 NOTE — PROGRESS NOTES
Pharmacy Med Education Group Note    Date: 4/13/22  Start Time: 1430  End Time: 4299    Number Participants in Group:  7    Goal:  Patient will demonstrate an understanding of the medications intended purpose and possible adverse effects  Topic: [place] for Pharmacy Med Ed Group    Discipline Responsible:     OT  AT  [facility].  RT     X Other       Participation Level:     None  Minimal      X Active Listener    X Interactive    Monopolizing         Participation Quality:    X Appropriate  Inappropriate     X       Attentive        Intrusive          Sharing        Resistant          Supportive        Lethargic       Affective:     X Congruent  Incongruent  Blunted  Flat    Constricted  Anxious  Elated  Angry    Labile  Depressed  Other         Cognitive:    X Alert  Oriented PPTP     Concentration   X G  F  P   Attention Span   X G  F  P   Short-Term Memory   X G  F  P   Long-Term Memory  G  F  P   ProblemSolving/  Decision Making  G  F  P   Ability to Process  Information   X G  F  P      Contributing Factors             Delusional             Hallucinating             Flight of Ideas             Other:       Modes of Intervention:    X Education   X Support  Exploration    Clarifying  Problem Solving  Confrontation    Socialization  Limit Setting  Reality Testing    Activity  Movement  Media    Other:            Response to Learning:    X Able to verbalize current knowledge/experience    Able to verbalize/acknowledge new learning    Able to retain information    Capable of insight    Able to change behavior    Progressing to goal    Other:        Comments:     Lissett Stafford RPH,PharmD,  4/13/2022, 4:04 PM

## 2022-04-13 NOTE — FLOWSHEET NOTE
*Patient participated in [facility]     *Patient stayed after class and talked for a little while       04/13/22 1426   Encounter Summary   Services provided to: Patient   Referral/Consult From: [facility]   (4/13/22)   Complexity of Encounter Moderate   Length of Encounter 45 minutes   Spiritual Assessment Completed Yes   Spiritual/Adventist   Type Spiritual support   Assessment Calm; Approachable   Intervention Active listening;Prayer   Outcome Receptive;Engaged in conversation

## 2022-04-13 NOTE — PROGRESS NOTES
Daily Progress Note  4/13/2022    Patient Name: Rodolfo Quigley: Depression with suicidal ideation and a plan with intent to stab self. Acute psychosis. SUBJECTIVE:     Patient seen face-to-face for follow-up assessment. Patient has been compliant with scheduled medications by mouth however refused long-acting injection. Patient has not required emergency medications in the past 24 hours. When approached for interview patient continues to present as flat and monotone. Patient continues to endorse significant depression and anxiety at this time. Patient continues to minimize suicidal ideations prior to admission. Patient reports current improvement in suicidal ideation however is unable to discuss any coping skills and is not able to contract for safety outside the hospital.  When discussing auditory hallucinations patient initially denied. Later in interview patient reported that she is hearing voices currently of negative insults. Patient states she always hears voices and previously has not been telling the truth to staff. Patient denies command auditory hallucinations and visual hallucinations. When discussing medication side effects patient denies. Patient reports that she does not want long-acting injection of Invega and reports Larissa Ditto is not helpful. Patient states she wants Resporal however will refuse long-acting injection of Risperdal as well.         Appetite:  [x] Normal/Adequate/Unchanged  [] Increased  [] Decreased      Sleep:       [x] Normal/Adequate/Unchanged  [] Fair  [] Poor      Group Attendance on Unit:   [x] Yes  [] Selectively    [] No    Medication Side Effects: Denies         Mental Status Exam  Level of consciousness: Alert and awake   Appearance: Appropriate attire for setting, seated in chair, with fair  grooming and hygiene, wearing bilateral boots secondary to foot fracture  Behavior/Motor: Approachable, restless, distant  Attitude toward examiner: Cooperative, fair attention, fair eye contact,  Speech:  normal volume, monotone  Mood: Patient reports \"okay\"  Affect: Blunted, anxious  Thought processes: Coherent, minimizing  Thought content: Denies homicidal ideation  Suicidal Ideation: Reports improvement in suicidal ideation, unable to contract for safety off unit. Delusions: Paranoia  Perceptual Disturbance: Patient does not appear to be responding to internal stimuli. Endorses auditory hallucinations. Denies visual hallucinations. Cognition: Oriented to self,, location, and time, and situation  memory: Intact  Insight & Judgement: Poor    Data   height is 5' 2\" (1.575 m) and weight is 133 lb (60.3 kg). Her temperature is 98.2 °F (36.8 °C). Her blood pressure is 127/73 and her pulse is 98. Her respiration is 14 and oxygen saturation is 99%.    Labs:   Admission on 04/08/2022   Component Date Value Ref Range Status    WBC 04/08/2022 8.8  4.5 - 13.5 k/uL Final    RBC 04/08/2022 4.40  4.0 - 5.2 m/uL Final    Hemoglobin 04/08/2022 12.9  12.0 - 16.0 g/dL Final    Hematocrit 04/08/2022 38.0  36 - 46 % Final    MCV 04/08/2022 86.4  80 - 100 fL Final    MCH 04/08/2022 29.4  26 - 34 pg Final    MCHC 04/08/2022 34.0  31 - 37 g/dL Final    RDW 04/08/2022 13.6  11.5 - 14.9 % Final    Platelets 66/01/3641 297  150 - 450 k/uL Final    MPV 04/08/2022 7.4  6.0 - 12.0 fL Final    Seg Neutrophils 04/08/2022 52  34 - 64 % Final    Lymphocytes 04/08/2022 35  25 - 45 % Final    Monocytes 04/08/2022 8  2 - 8 % Final    Eosinophils % 04/08/2022 4  0 - 4 % Final    Basophils 04/08/2022 1  0 - 2 % Final    Segs Absolute 04/08/2022 4.60  1.3 - 9.1 k/uL Final    Absolute Lymph # 04/08/2022 3.10  1.2 - 5.2 k/uL Final    Absolute Mono # 04/08/2022 0.70  0.1 - 1.3 k/uL Final    Absolute Eos # 04/08/2022 0.40  0.0 - 0.4 k/uL Final    Basophils Absolute 04/08/2022 0.10  0.0 - 0.2 k/uL Final    Glucose 04/08/2022 74  70 - 99 mg/dL Final    BUN 04/08/2022 6  6 - 20 mg/dL Final    CREATININE 04/08/2022 0.51  0.50 - 0.90 mg/dL Final    Calcium 04/08/2022 9.2  8.6 - 10.4 mg/dL Final    Sodium 04/08/2022 138  135 - 144 mmol/L Final    Potassium 04/08/2022 4.1  3.7 - 5.3 mmol/L Final    Chloride 04/08/2022 101  98 - 107 mmol/L Final    CO2 04/08/2022 25  20 - 31 mmol/L Final    Anion Gap 04/08/2022 12  9 - 17 mmol/L Final    Alkaline Phosphatase 04/08/2022 78  35 - 104 U/L Final    ALT 04/08/2022 9  5 - 33 U/L Final    AST 04/08/2022 11  <32 U/L Final    Total Bilirubin 04/08/2022 0.19* 0.3 - 1.2 mg/dL Final    Total Protein 04/08/2022 7.1  6.4 - 8.3 g/dL Final    Albumin 04/08/2022 4.6  3.5 - 5.2 g/dL Final    GFR Non- 04/08/2022 >60  >60 mL/min Final    GFR  04/08/2022 >60  >60 mL/min Final    GFR Comment 04/08/2022        Final    Comment: Average GFR for 20-28 years old:   116 mL/min/1.73sq m  Chronic Kidney Disease:   <60 mL/min/1.73sq m  Kidney failure:   <15 mL/min/1.73sq m              eGFR calculated using average adult body mass.  Additional eGFR calculator available at:        [employer]            Lipase 04/08/2022 23  13 - 60 U/L Final    Color, UA 04/08/2022 Yellow  Yellow Final    Turbidity UA 04/08/2022 Clear  Clear Final    Glucose, Ur 04/08/2022 NEGATIVE  NEGATIVE Final    Bilirubin Urine 04/08/2022 NEGATIVE  NEGATIVE Final    Ketones, Urine 04/08/2022 NEGATIVE  NEGATIVE Final    Specific Gravity, UA 04/08/2022 1.003  1.000 - 1.030 Final    Urine Hgb 04/08/2022 NEGATIVE  NEGATIVE Final    pH, UA 04/08/2022 7.0  5.0 - 8.0 Final    Protein, UA 04/08/2022 NEGATIVE  NEGATIVE Final    Urobilinogen, Urine 04/08/2022 Normal  Normal Final    Nitrite, Urine 04/08/2022 NEGATIVE  NEGATIVE Final    Leukocyte Esterase, Urine 04/08/2022 NEGATIVE  NEGATIVE Final    WBC, UA 04/08/2022 0 TO 2  /HPF Final    RBC, UA 04/08/2022 0 TO 2  /HPF Final    Casts UA 04/08/2022 0 TO 2  /LPF Final    Epithelial Cells UA 04/08/2022 0 TO 2  /HPF Final    Bacteria, UA 04/08/2022 None  None Final    hCG Qual 04/08/2022 NEGATIVE  NEGATIVE Final    Comment: Specimens with hCG levels near the threshold of the test (25 mIU/mL) may give a negative or   indeterminate result. In such cases, another test should be performed with a new specimen   in 48-72 hours. If early pregnancy is suspected clinically in this setting, correlation   with quantitative serum b-hCG level is suggested.  Ethanol 04/08/2022 <10  <10 mg/dL Final    Ethanol percent 04/08/2022 <0.010  % Final    Acetaminophen Level 04/08/2022 <5* 10 - 30 ug/mL Final    Salicylate Lvl 97/23/1774 <1* 3 - 10 mg/dL Final    Specimen Description 04/08/2022 . NASOPHARYNGEAL SWAB   Final    SARS-CoV-2, Rapid 04/08/2022 Not Detected  Not Detected Final    Comment:       Rapid NAAT:  The specimen is NEGATIVE for SARS-CoV-2, the novel coronavirus associated with   COVID-19. The ID NOW COVID-19 assay is designed to detect the virus that causes COVID-19 in patients   with signs and symptoms of infection who are suspected of COVID-19. An individual without symptoms of COVID-19 and who is not shedding SARS-CoV-2 virus would   expect to have a negative (not detected) result in this assay. Negative results should be treated as presumptive and, if inconsistent with clinical signs   and symptoms or necessary for patient management,  should be tested with an alternative molecular assay. Negative results do not preclude   SARS-CoV-2 infection and   should not be used as the sole basis for patient management decisions. Fact sheet for Healthcare Providers: [employer]  Fact sheet for Patients: Mig[employer]alia          Methodology: Isothermal Nucleic Acid Amplification           Reviewed patient's current plan of care and vital signs with nursing staff.     Labs reviewed: [x] Yes  Last EKG in EMR reviewed: [x] Yes    Medications  Current Facility-Administered Medications: paliperidone palmitate ER (INVEGA SUSTENNA) IM injection 156 mg, 156 mg, IntraMUSCular, Once  chlorproMAZINE (THORAZINE) tablet 25 mg, 25 mg, Oral, TID PRN  ondansetron (ZOFRAN) injection 4 mg, 4 mg, IntraVENous, Once  haloperidol lactate (HALDOL) injection 5 mg, 5 mg, IntraMUSCular, Q6H PRN **AND** LORazepam (ATIVAN) injection 2 mg, 2 mg, IntraMUSCular, Q6H PRN **AND** diphenhydrAMINE (BENADRYL) injection 50 mg, 50 mg, IntraMUSCular, Q6H PRN  acetaminophen (TYLENOL) tablet 650 mg, 650 mg, Oral, Q6H PRN  ibuprofen (ADVIL;MOTRIN) tablet 400 mg, 400 mg, Oral, Q6H PRN  hydrOXYzine (ATARAX) tablet 50 mg, 50 mg, Oral, TID PRN  traZODone (DESYREL) tablet 50 mg, 50 mg, Oral, Nightly PRN  polyethylene glycol (GLYCOLAX) packet 17 g, 17 g, Oral, Daily PRN  aluminum & magnesium hydroxide-simethicone (MAALOX) 200-200-20 MG/5ML suspension 30 mL, 30 mL, Oral, Q6H PRN  nicotine polacrilex (NICORETTE) gum 2 mg, 2 mg, Oral, Q2H PRN  paliperidone (INVEGA) extended release tablet 3 mg, 3 mg, Oral, Daily    ASSESSMENT  Acute psychosis ([facility] [address]    Recreational/illicit substance use. No UDS obtained. HANDOFF  Patient symptoms are:  Remains unstable  Medications as determined by attending physician  MD advise: Consider switching to oral Risperdal  Encourage participation in groups and milieu. Probable discharge is to be determined by MD    Electronically signed by LYNNE Crum CNP on 4/13/2022 at 3:52 PM    **This report has been created using voice recognition software. It may contain minor errors which are inherent in voice recognition technology. **  I independently saw and evaluated the patient. I reviewed the nurse practioner's documentation above. Any additional comments or changes to the    documentation are stated below otherwise agree with assessment.       The patient reports an improvement in her mood with the medication. Ally Carlin continues to refuse a long-acting injection.  She has been interacting appropriately with peers and has been participating in groups.  She is denying any suicidal thoughts.  If she continues to keep up her improvement, she could be discharged tomorrow                PLAN  Medications as noted above  Attempt to develop insight  Psycho-education conducted. Supportive Therapy conducted.   Probable discharge is 1-2 days  Follow-up daily while on inpatient unit    Electronically signed by Cristobal Elliott MD on 4/13/22 at 8:40 PM EDT

## 2022-04-13 NOTE — PLAN OF CARE
Problem: Pain:  Goal: Control of acute pain  Description: Control of acute pain  Outcome: Ongoing  Pt. Relates to some pain in her feet but gets around with walking boots very well. Problem: Depressive Behavior With or Without Suicide Precautions:  Goal: Able to verbalize acceptance of life and situations over which he or she has no control  Description: Able to verbalize acceptance of life and situations over which he or she has no control  Outcome: Ongoing  Pt. Relates to some depression and anxiety but denies suicidal thoughts. Out and social with select peers. Denies hallucinations. Appropriate self care; Showered. Problem: Depressive Behavior With or Without Suicide Precautions:  Goal: Able to verbalize and/or display a decrease in depressive symptoms  Description: Able to verbalize and/or display a decrease in depressive symptoms  Outcome: Ongoing  Pt. Attending groups and active in milieu. On phone often. Medication compliant. Pt. Remains safe on the unit. Q 15 minute checks for safety maintained. Problem: Depressive Behavior With or Without Suicide Precautions:  Goal: Ability to disclose and discuss suicidal ideas will improve  Description: Ability to disclose and discuss suicidal ideas will improve  4/13/2022 Angela Cummings Child  Outcome: Ongoing  Denies suicidal or homicidal thoughts. Pt. Remains safe on the unit. Q 15 minute checks for safety maintained.

## 2022-04-13 NOTE — GROUP NOTE
Group Therapy Note    Date: 4/13/2022    Group Start Time: 1100  Group End Time: 2849  Group Topic: Cognitive Skills    [facility]    ROLA Welsh        Group Therapy Note    Attendees: 8/20         Patient's Goal:  To increase social interaction and to practice problem solving,  and communication skills. Notes: Pt participated fully in group. Pt was able to practice problem solving, and communication skills         Status After Intervention:  Improved     Participation Level:  Active Listener, sharing, appropriate     Participation Quality: Appropriate, Sharing, Attentive, supportive        Speech:  Normal        Thought Process/Content: Logical ,linear     Affective Functioning: Blunted      Mood: Euthymic     Level of consciousness:  Alert, and Attentive        Response to Learning:  Able to express current knowledge, able to verbalize/acknowledge new learning, and Progressing to goal        Endings: None Reported     Modes of Intervention: Education, Support, Socialization, Exploration, Clarifying and Problem-solving        Discipline Responsible: Psychoeducational Specialist        Signature:  ROLA March

## 2022-04-14 VITALS
OXYGEN SATURATION: 99 % | BODY MASS INDEX: 24.48 KG/M2 | HEIGHT: 62 IN | HEART RATE: 91 BPM | SYSTOLIC BLOOD PRESSURE: 122 MMHG | DIASTOLIC BLOOD PRESSURE: 77 MMHG | RESPIRATION RATE: 14 BRPM | TEMPERATURE: 98.1 F | WEIGHT: 133 LBS

## 2022-04-14 PROCEDURE — 6370000000 HC RX 637 (ALT 250 FOR IP): Performed by: PSYCHIATRY & NEUROLOGY

## 2022-04-14 PROCEDURE — 99239 HOSP IP/OBS DSCHRG MGMT >30: CPT | Performed by: PSYCHIATRY & NEUROLOGY

## 2022-04-14 RX ORDER — PALIPERIDONE 3 MG/1
3 TABLET, EXTENDED RELEASE ORAL DAILY
Qty: 30 TABLET | Refills: 3 | Status: SHIPPED | OUTPATIENT
Start: 2022-04-15 | End: 2022-07-07

## 2022-04-14 RX ADMIN — NICOTINE POLACRILEX 2 MG: 2 GUM, CHEWING BUCCAL at 10:10

## 2022-04-14 RX ADMIN — PALIPERIDONE 3 MG: 3 TABLET, EXTENDED RELEASE ORAL at 08:41

## 2022-04-14 NOTE — PROGRESS NOTES
CLINICAL PHARMACY NOTE: MEDS TO BEDS    Total # of Prescriptions Filled: 1   The following medications were delivered to the patient:  · Paliperidone ER 3mg    Additional Documentation:  Delivered medications to nurses station

## 2022-04-14 NOTE — GROUP NOTE
Group Therapy Note    Date: 4/14/2022    Group Start Time: 1000  Group End Time: 5776  Group Topic: Group Therapy    DELLA Haq     Brief 1:1      [facility]    [id number] and 8/21    PT participated in 1:1 individual brief therapy and engages in conversation regarding discharge and safety planning, and short- and long-term goals. PT able to identify all goals as well as starting to plan for discharge.     Patient's Goal:  To actively participate in 1:1 meeting and to start discharge and safety planning as well as given a journal.  Discipline Responsible: /Counselor      Signature:  Ashley Santos MSW, LSW
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
## 2022-04-14 NOTE — GROUP NOTE
Group Therapy Note    Date: 4/14/2022    Group Start Time: 0900  Group End Time: 0930  Group Topic: Group Documentation    [facility]  [address], RN        Group Therapy Note    Attendees:          Patient's Goal:  Setting a goal for the day    Notes:  Goals group    Status After Intervention:  Unchanged    Participation Level: Minimal    Participation Quality: Appropriate      Speech:  normal      Thought Process/Content: Logical      Affective Functioning: Congruent      Mood: depressed      Level of consciousness:  Alert      Response to Learning: Able to retain information      Endings: None Reported    Modes of Intervention: Education      Discipline Responsible: Registered Nurse      Signature:  Brit Koch RN

## 2022-04-14 NOTE — GROUP NOTE
Group Therapy Note    Date: 4/13/2022    Group Start Time: 2030  Group End Time: 2045  Group Topic: Wrap-Up    [facility]    Mona Agarwal RN        Group Therapy Note    Attendees: 10/19        Status After Intervention:  Improved    Participation Level:  Active Listener    Participation Quality: Appropriate      Speech:  normal      Thought Process/Content: Logical      Affective Functioning: Congruent      Level of consciousness:  Alert      Response to Learning: Able to verbalize current knowledge/experience      Endings: None Reported    Modes of Intervention: Education      Discipline Respons[facility]: [facility]      Signature:  Mona Agarwal RN

## 2022-04-14 NOTE — BH NOTE
Patient given tobacco quitline number 40932884687 at this time, refusing to call at this time, states \" I just dont want to quit now\"- patient given information as to the dangers of long term tobacco use. Continue to reinforce the importance of tobacco cessation.

## 2022-04-14 NOTE — CARE COORDINATION
Name: Erin Newberry    : 2000    Discharge Date: 22    Primary Auth/Cert #: [id number]    Destination: Private residence    Discharge Medications:      Medication List      START taking these medications    paliperidone 3 MG extended release tablet  Commonly known as: INVEGA  Take 1 tablet by mouth daily  Start taking on: April 15, 2022  Notes to patient: Clear thoughts        STOP taking these medications    hydrOXYzine 25 MG tablet  Commonly known as: ATARAX     risperiDONE 2 MG tablet  Commonly known as: RISPERDAL           Where to Get Your Medications      These medications [facility] to [facility]  [address] [facility] [address], [address] [facility] [address]    Phone: [phone]   · paliperidone 3 MG extended release tablet         Follow Up Appointment: Discharge to home:  [address]  Go to  Pt will have a ride home at discharge     [address]  Phone: [phone]  Fax: [phone]    On 2022  You have a scheduled appointment on Monday May 2nd at at 10:40 am with Elizabeth Farmer at the [facility] office

## 2022-04-14 NOTE — PLAN OF CARE
Problem: Depressive Behavior With or Without Suicide Precautions:  Goal: Able to verbalize acceptance of life and situations over which he or she has no control  Description: Able to verbalize acceptance of life and situations over which he or she has no control  4/13/2022 2247 by Enid Calles RN  Outcome: Ongoing     Problem: Depressive Behavior With or Without Suicide Precautions:  Goal: Able to verbalize and/or display a decrease in depressive symptoms  Description: Able to verbalize and/or display a decrease in depressive symptoms  4/13/2022 2247 by Enid Calles RN  Outcome: Ongoing     Problem: Depressive Behavior With or Without Suicide Precautions:  Goal: Ability to disclose and discuss suicidal ideas will improve  Description: Ability to disclose and discuss suicidal ideas will improve  4/13/2022 2247 by Enid Calles RN  Outcome: Ongoing  Note:  Pt denies suicidal ideations at this time. Pt agreed to seek staff at anytime he/she felt like any urges to harm self would arise. Safety checks maintained [id number].

## 2022-04-14 NOTE — GROUP NOTE
Group Therapy Note    Date: 4/14/2022    Group Start Time: 1100  Group End Time: 1161  Group Topic: Cognitive Skills    DELLA Navarro, CTRS        Group Therapy Note    Attendees: 8/21         Pt did not participate in Cognitive Skills Group at 1100am when encouraged by RT due to resting in room. Pt was offered talk time as an alternative to group but declined.        Discipline Responsible: Psychoeducational Specialist      Signature:  Blossom Blakely

## 2022-04-14 NOTE — PLAN OF CARE
Problem: Depressive Behavior With or Without Suicide Precautions:  Goal: Ability to disclose and discuss suicidal ideas will improve  Description: Ability to disclose and discuss suicidal ideas will improve  4/14/2022 1047 by Yung Horton RN  Outcome: Ongoing  Note: Patient denies thoughts of self harm or hallucinations. Refused select morning medications. Behavior controlled.

## 2022-04-14 NOTE — BH NOTE
[facility]  Discharge Note    Pt discharged with followings belongings:   Dental Appliances: None  Vision - Corrective Lenses: None  Hearing Aid: None  Jewelry: None  Body Piercings Removed: N/A  Clothing: Jacket / coat,Pants,Shirt,Undergarments (Comment) (1 pair underwear)  Were All Patient Medications Collected?: Not Applicable  Other Valuables: Purse,Other (Comment),Money (Comment) (two visa cards, $7.85, [place] ID)   Valuables sent home with  patient.  Patient education on aftercare instructions: yes  Information faxed to [facility]  by staff at 2:55 PM .Patient verbalize understanding of AVS:  Yes     Status EXAM upon discharge:  Status and Exam  Normal: No  Facial Expression: Flat  Affect: Appropriate  Level of Consciousness: Alert  Mood:Normal: No  Mood: Anxious,Depressed  Motor Activity:Normal: Yes  Interview Behavior: Cooperative  Preception: Guys to Person,Guys to Time,Guys to Place,Guys to Situation  Attention:Normal: Yes  Attention: Distractible  Thought Processes: Circumstantial  Thought Content:Normal: No  Thought Content: Preoccupations  Hallucinations: None  Delusions: No  Memory:Normal: Yes  Memory: Poor Recent,Poor Remote  Insight and Judgment: No  Insight and Judgment: Poor Judgment,Poor Insight  Present Suicidal Ideation: No  Present Homicidal Ideation: No      Metabolic Screening:    No results found for: LABA1C    No results found for: CHOL  No results found for: TRIG  No results found for: HDL  No components found for: LDLCAL  No results found for: LABVLDL  Discharged to home address, picked up by boyfriend  William Adan LPN

## 2022-05-09 ENCOUNTER — APPOINTMENT (OUTPATIENT)
Dept: PHYSICAL THERAPY | Age: 22
End: 2022-05-09
Payer: COMMERCIAL

## 2022-05-13 DIAGNOSIS — S92.902D CLOSED FRACTURE OF LEFT FOOT WITH ROUTINE HEALING: ICD-10-CM

## 2022-05-13 DIAGNOSIS — S92.301D MULTIPLE CLOSED FRACTURES OF METATARSAL BONE OF RIGHT FOOT WITH ROUTINE HEALING, SUBSEQUENT ENCOUNTER: Primary | ICD-10-CM

## 2022-05-16 ENCOUNTER — OFFICE VISIT (OUTPATIENT)
Dept: ORTHOPEDIC SURGERY | Age: 22
End: 2022-05-16
Payer: COMMERCIAL

## 2022-05-16 VITALS — HEIGHT: 62 IN | WEIGHT: 133 LBS | RESPIRATION RATE: 16 BRPM | BODY MASS INDEX: 24.48 KG/M2

## 2022-05-16 DIAGNOSIS — S92.301D MULTIPLE CLOSED FRACTURES OF METATARSAL BONE OF RIGHT FOOT WITH ROUTINE HEALING, SUBSEQUENT ENCOUNTER: Primary | ICD-10-CM

## 2022-05-16 DIAGNOSIS — S92.901D CLOSED FRACTURE OF RIGHT FOOT WITH ROUTINE HEALING: ICD-10-CM

## 2022-05-16 DIAGNOSIS — S92.002D CLOSED DISPLACED FRACTURE OF LEFT CALCANEUS WITH ROUTINE HEALING, UNSPECIFIED PORTION OF CALCANEUS, SUBSEQUENT ENCOUNTER: ICD-10-CM

## 2022-05-16 DIAGNOSIS — Z96.9 RETAINED ORTHOPEDIC HARDWARE: ICD-10-CM

## 2022-05-16 DIAGNOSIS — S93.324D DISLOCATION OF TARSOMETATARSAL JOINT OF RIGHT FOOT, SUBSEQUENT ENCOUNTER: ICD-10-CM

## 2022-05-16 DIAGNOSIS — S92.002D CLOSED DISPLACED FRACTURE OF LEFT CALCANEUS WITH ROUTINE HEALING, UNSPECIFIED PORTION OF CALCANEUS, SUBSEQUENT ENCOUNTER: Primary | ICD-10-CM

## 2022-05-16 PROCEDURE — 1036F TOBACCO NON-USER: CPT | Performed by: ORTHOPAEDIC SURGERY

## 2022-05-16 PROCEDURE — 99213 OFFICE O/P EST LOW 20 MIN: CPT | Performed by: ORTHOPAEDIC SURGERY

## 2022-05-16 PROCEDURE — G8420 CALC BMI NORM PARAMETERS: HCPCS | Performed by: ORTHOPAEDIC SURGERY

## 2022-05-16 PROCEDURE — G8427 DOCREV CUR MEDS BY ELIG CLIN: HCPCS | Performed by: ORTHOPAEDIC SURGERY

## 2022-05-16 NOTE — PROGRESS NOTES
Roxy Fernandes [facility] [address].  [address] [facility] [address]  Dept: [phone]    Ambulatory Orthopedic Follow Up Visit    Preoperative Diagnosis:   1. Left displaced intra-articular calcaneus fracture  2. Right midfoot/Lisfranc injury (fracture of the base of the second through fourth metatarsals, avulsion fracture of the medial cuneiform, bone contusions of the first metatarsal base and medial cuneiform)  3. Body mass index is 22.86 kg/m².     Postoperative Diagnosis:   1. Same as above     Procedures Performed:  (12/16/2021)  1. Left calcaneus fracture open reduction internal fixation, using small incision technique  2. Right foot open treatment of tarsometatarsal dislocation with internal fixation (first and second TMT joint)  3. Right foot open treatment of metatarsal fractures (second metatarsal fracture)  4. Right foot open treatment of tarsal dislocation (intercuneiform joint)  5. Right foot stress x-rays under fluoroscopy      CHIEF COMPLAINT:    Chief Complaint   Patient presents with    Foot Pain     Bilateral         HISTORY OF PRESENT ILLNESS:       The patient is a 23 y.o. female who is being seen for evaluation of the above. Since being seen last, the patient is doing better. At today's visit, she is using cam boots bilaterally. History is obtained today from:   [x]  the patient     []  EMR     [x]  one family member/friend    []  multiple family members/friends    []  other:      Notably, a member of the office staff is present for the entire patient interaction, Armando Tubbs RN. At today's visit, the the patient is here with her grandmother, and they report that she has not yet started physical therapy. She reports that she gets some mild pain in the left plantar heel, but denies any pain in the left posterior heel and lateral hindfoot; she reports that she never gets any pain in the right foot.   Since her last visit, she was hospitalized again due to mental health issues. REVIEW OF SYSTEMS:  Musculoskeletal: See HPI for pertinent positives     Past Medical History:    She  has a past medical history of ADHD (attention deficit hyperactivity disorder), Anxiety, and Depression. Past Surgical History:    She  has a past surgical history that includes Adenoidectomy; Tonsillectomy (2003); and Foot fracture surgery (Bilateral, 12/16/2021). Current Medications:     Current Outpatient Medications:     paliperidone (INVEGA) 3 MG extended release tablet, Take 1 tablet by mouth daily, Disp: 30 tablet, Rfl: 3     Allergies:    Patient has no known allergies. Family History:  family history is not on file.     Social History:   Social History     Occupational History    Not on file   Tobacco Use    Smoking status: Former Smoker    Smokeless tobacco: Never Used   Vaping Use    Vaping Use: Never used   Substance and Sexual Activity    Alcohol use: Yes     Comment: socially    Drug use: Not Currently     Comment: previous marijuan use    Sexual activity: Yes       OBJECTIVE:  Resp 16   Ht 5' 2\" (1.575 m)   Wt 133 lb (60.3 kg)   BMI 24.33 kg/m²    Psych: awake, alert  Cardio:  well perfused extremities, no cyanosis  Resp:  normal respiratory effort  Musculoskeletal:    RLE:   Incisions clean/dry/intact, no erythema/dehiscence/drainage  Sensation to light touch grossly intact throughout  Warm and well perfused  Grossly neurovascularly intact distally  No signs of infection  No calf swelling/tenderness  No tenderness throughout the midfoot        LLE:   Incisions clean/dry/intact, no erythema/dehiscence/drainage  Sensation to light touch grossly intact throughout  Warm and well perfused  Grossly neurovascularly intact distally  No signs of infection  No calf swelling/tenderness  -Tenderness: Plantar heel--minimal  -No tenderness of the sinus Tarsi, or posterior heel  -Good subtalar joint range of motion, painless  -Prominence of the posterior aspect of the left heel, possibly related to hardware, no tenderness      RADIOLOGY:   5/16/2022 FINDINGS:  Three views (AP, Oblique, Lateral) of the right foot and two views (Axial and Lateral) of the left calcaneus were obtained in the office today and reviewed, revealing well-seated intact hardware across the left calcaneus fracture, and well-seated intact hardware across the right midfoot. Interval healing without definitive interval displacement. On the left, there is questionable interval displacement of the posterior facet, but this may also be secondary to the rotation.     IMPRESSION: Status post left calcaneus fracture ORIF and right midfoot ORIF.     Electronically signed by Bertha Quiroga MD      ASSESSMENT AND PLAN:  Body mass index is 24.33 kg/m². She is status post the above, doing well overall from an orthopedic surgical perspective. Her postoperative course was complicated by not adhering to the recommended DVT prophylaxis regimen, as well as not adhering to the weightbearing precaution. At the 6-month jonatan, she has not been to physical therapy, but does appear to be doing well clinically. We discussed that she may have potential intra-articular screw penetration in the subtalar joint on the left, possibly secondary to posterior facet subsequent displacement after surgery; at this time, she has no pain in the subtalar joint region and we discussed potentially taking out her hardware in the future.       She has a history of a left displaced intra-articular calcaneus fracture, and right midfoot injury with findings concerning for a Lisfranc injury (fractures at the base of the second through fourth metatarsals, avulsion fracture of the medial cuneiform, bony contusions of the first metatarsal base and medial cuneiform), sustained on 12/9/2021.      Notably, she has a complex past medical history.  She has a history of acute psychosis and a jump from a height. We had a discussion today about the likely diagnosis and its natural history, physical exam and imaging findings, as well as various treatment options in detail. Surgically, we discussed a possible future removal of hardware, depending on her clinical course. At this time, she is asymptomatic on the right, and does have plantar heel pain on the left, but no pain in the sinus Tarsi/subtalar joint, and painless subtalar joint range of motion on the left. We have discussed the risks of arthritis and possible future surgery, and they have expressed verbal understanding. Orders/referrals were placed as below at today's visit. We discussed she has no specific restrictions. She may continue to advance her activity as tolerated. No immobilization is needed for stability at this time, and I encouraged her to wean out of the cam boots. She may continue weight bearing as tolerated. The patient was referred to physical therapy (she has not yet been). All questions were answered and the above plan was agreed upon. The patient will return to clinic in 6 months with repeat right foot and left calcaneus x-rays (3 views--lateral, axial, Broden's view). At her next visit, we may consider possible removal of her hardware.          At the patient's next visit, depending on how the patient is doing and/or new imaging/labs results, we may consider the following options:    []  Orthotic (OTC)     []  Orthotic (custom)          []  Rocker bottom shoes     []  Brace (OTC)        []  Brace (custom)             []  CAM boot        []  Night splint         []  Heel cups        []  Strap      []  Toe sleeves/splints    []  PT:                     []  Wean out of immobilization   []  Advance activity       []  Topical               []  NSAIDs          []  Maryjane         []  Referral:         []  Stress xrays       []  CT         []  MRI        []  Injection:         []  Consider OR      []  Pick OR date    No follow-ups on file. No orders of the defined types were placed in this encounter. No orders of the defined types were placed in this encounter. Guillermo Cuadra MD  Orthopedic Surgery        Please excuse any typos/errors, as this note was created with the assistance of voice recognition software. While intending to generate a document that actually reflects the content of the visit, the document can still have some errors including those of syntax and sound-a-like substitutions which may escape proof reading. In such instances, actual meaning can be extrapolated by context.

## 2022-05-27 ENCOUNTER — HOSPITAL ENCOUNTER (OUTPATIENT)
Dept: PHYSICAL THERAPY | Age: 22
Setting detail: THERAPIES SERIES
Discharge: HOME | End: 2022-05-27
Payer: COMMERCIAL

## 2022-05-27 PROCEDURE — 97110 THERAPEUTIC EXERCISES: CPT

## 2022-05-27 PROCEDURE — 97162 PT EVAL MOD COMPLEX 30 MIN: CPT

## 2022-05-27 NOTE — PROGRESS NOTES
[facility] AT [facility]  [address]         Phone: [phone]       Fax: [phone]    Physical Therapy Lower Extremity Evaluation    Date:  2022  Patient: Cristal Ba  : 2000  MRN: [id number]  Physician: Jami Burger     Insurance:  [facility] secondary  Medical Diagnosis: (R) foot metatarsal fracture S92.301D; (L) calcaneus fracture S92.002D  Rehab Codes: (R) foot pain M79.671; (L) foot pain M79.672  Onset date: referral 22  Next Dr's appt.: PRN  Visit Count:    Cancel/No Show: 0/0    Subjective:  Patient presents to therapy with complaints of (R) midfoot pain and (L) heel pain. Patient noted original injury in 2021, noted she jumped off a roof at that time. This resulted in [facility] fracture of the (R) midfoot and then (L) calcaneal fracture. Per most recent MD visit, she has no weightbearing restrictions and is currently ambulating without walking boot at this time. Currently notes improved overall function, but is still limited with full ability to do SL heel raise on the (L), notes difficulty with stepping down with the (L) foot with (R) midfoot pain, is limited with (R) DF ROM, and was limited with (L) tandem balance testing with clinical balance testing today. Patient was given exercises to work on ROM limitations, and plan progression of resisted exercises, balance, and stairs in the clinic with future therapy appointments.    CC:  Complaints of (R) midfoot pain, (L) calcaneus pain  HPI: injury in [address] of , referral 22    PMHx: [] Unremarkable [] Diabetes [] HTN  [] Pacemaker   [] MI/Heart Problems [] Cancer [] Arthritis [] Other:              [x] Refer to full medical chart  In EPIC     Comorbidities:   [] Obesity [] Dialysis  [] Other:   [] Asthma/COPD [] Dementia [] Other:   [] Stroke [] Sleep apnea [] Other:   [] Vascular disease [] Rheumatic disease [] Other:     Tests: [] X-Ray: [] MRI:  [] Other:     Medications: [x] Refer to full medical record [] None [] Other:  Allergies:      [x] Refer to full medical record [] None [] Other:    Function:  Hand Dominance  [] Right  [] Left  Working:  [] Normal Duty  [] Light Duty  [] Off D/T Condition  [] Retired    [] Not Employed    []  Disability  [] Other:           Return to work:   Job/ADL Description: not working    Previously (I) with all functional activity, currently limited as listed below    ADL/IADL Previous level of function Current level of function Who currently assists the patient with task   Bathing  [] Independent  [] Assist [] Independent  [] Assist    Dress/grooming [] Independent  [] Assist [] Independent  [] Assist    Transfer/mobility [] Independent  [] Assist [] Independent  [] Assist    Feeding [] Independent  [] Assist [] Independent  [] Assist    Toileting [] Independent  [] Assist [] Independent  [] Assist    Driving [] Independent  [] Assist [] Independent  [] Assist    Housekeeping [] Independent  [] Assist [x] Independent  [] Assist Limited with squatting/kneeling   Grocery shop/meal prep [] Independent  [] Assist [x] Independent  [] Assist Difficulty prolonged walking     Gait Prior level of function Current level of function    [] Independent  [] Assist [x] Independent  [] Assist   Device: [] Independent [] Independent    [] Straight Cane [] Quad cane [] Straight Cane [] Quad cane    [] Standard walker [] Rolling walker   [] 4 wheeled walker [] Standard walker [] Rolling walker   [] 4 wheeled walker    [] Wheelchair [] Wheelchair     Pain:  [x] Yes  [] No Location: (R) midfoot; (L) calcaneus Pain Rating: (0-10 scale) (R) 0 at rest, up to 2-3/10; (L) Achilles 0 at rest, up to 2-3/10  Pain altered Tx:  [] Yes  [] No  Action:  Symptoms:  [] Improving [] Worsening [] Same  Better:  [] AM    [] PM    [] Sit    [] Rise/Sit    []Stand    [] Walk    [] Lying    [] Other:  Worse: [] AM    [] PM    [] Sit    [] Rise/Sit []Stand    [] Walk    [] Lying    [] Bend                             [] Valsalva    [] Other:  Sleep: [] OK    [] Disturbed    Objective:  AROM:  DF (R) neutral, (L) 10 degrees  PF 65 degrees (B)  INV 25 degrees (B)  EV 15 degrees (B)    Strength   DF, INV, EV, PF 4+/5 with mild (R) midfoot discomfort with testing    (B) heel raise- Able to do  SL heel raise (R) able, (L) unable    Stairs- able to do 8\" stairs consecutively for ascending stairs, able to descend 8\" stairs consecutively but difficulty with stepdown with (L) with (R) midfoot pain    Tandem balance  (L) behind (R) 5 seconds  (R) behind (L) 8 seconds    OBSERVATION No Deficit Deficit Not Tested Comments   Posture       Forward Head [] [] []    Rounded Shoulders [] [] []    Kyphosis [] [] []    Lordosis [] [] []    Lateral Shift [] [] []    Scoliosis [] [] []    Iliac Crest [] [] []    PSIS [] [] []    ASIS [] [] []    Genu Valgus [] [] []    Genu Varus [] [] []    Genu Recurvatum [] [] []    Pronation [] [] []    Supination [] [] []    Leg Length Discrp [] [] []    Slumped Sitting [] [] []    Palpation [] [] []    Sensation [] [] []    Edema [] [] []    Neurological [] [] []    Patellar Mobility [] [] []    Patellar Orientation [] [] []    Gait [] [] [] Analysis: Mild decrease of WB on (L) side with early heel off         FUNCTION Normal Difficult Unable   Sitting [x] [] []   Standing [x] [] []   Ambulation [x] [] []   Groom/Dress [x] [] []   Lift/Carry [] [x] []   Stairs [] [x] []   Bending [] [x] []   Squat [] [x] []   Kneel [] [x] []         Comments:    Assessment:  Patient limited with full ankle ROM (R), limited with full tolerance of stairs, balance, functional strength testing with inability to do (L) SL heel raise. Symptoms are consistent with limitations following foot fractures (B). [] Patient would continue to benefit from skilled physical therapy services in order to: improve and normalize mobility, ambulation, function.     Problems: [x] ? Pain:     [x] ? ROM:    [x] ? Strength:    [x] ? Function:    [x] ? Balance      STG: (to be met in 6 treatments)  1. ? Pain: Improved pain 0-1/10  2. ? ROM: (R) DF to 5 degrees  3. ? Strength: Able to do SL heel raise and 6\" stepdown without pain  4. Independent with Home Exercise Programs    LTG: (to be met in 12 treatments)  5. ? Pain: Improved pain 0/10  6. ? ROM: (R) DF to 10 degrees  7. ? Strength: Able to do SL heel raise and 8\" stepdown without pain, tandem balance 10 seconds (B)  8. Independent with Home Exercise Programs                 Patient goals: improving running tolerance      Functional Assessment Used: optimal testing 9/3 50% limited for stairs, walking long distances, squatting  Current Status: Score  Goal Status: Score    Evaluation Complexity:  History (Personal factors, comorbidities) [] 0 [x] 1-2 [] 3+   Exam (limitations, restrictions) [] 1-2 [x] 3 [] 4+   Clinical presentation (progression) [] Stable [x] Evolving  [] Unstable   Decision Making [] Low [x] Moderate [] High    [] Low Complexity [x] Moderate Complexity [] High Complexity       Rehab Potential:  [] Good  [x] Fair  [] Poor   Suggested Professional Referral:  [x] No  [] Yes:  Barriers to Goal Achievement[de-identified]  [x] No  [] Yes:  Domestic Concerns:  [x] No  [] Yes:    Pt. Education:  [x] Plans/Goals, Risks/Benefits discussed  [x] Home exercise program    Method of Education: [x] Verbal  [] Demo  [x] Written  Comprehension of Education:  [x] Verbalizes understanding. [] Demonstrates understanding. [] Needs Review. [] Demonstrates/verbalizes understanding of HEP/Ed previously given.     Treatment Plan:  [x] Therapeutic Exercise   24043  [] Iontophoresis: 4 mg/mL Dexamethasone Sodium Phosphate  mAmin  66129   [] Therapeutic Activity  55072 [x] Vasopneumatic cold with compression  81505 PRN   [] Gait Training   46713 [] Ultrasound   38413   [x] Neuromuscular Re-education  22302 [] Electrical Stimulation Unattended  74264

## 2022-06-01 ENCOUNTER — APPOINTMENT (OUTPATIENT)
Dept: PHYSICAL THERAPY | Age: 22
End: 2022-06-01
Payer: COMMERCIAL

## 2022-06-02 ENCOUNTER — HOSPITAL ENCOUNTER (OUTPATIENT)
Dept: PHYSICAL THERAPY | Age: 22
Setting detail: THERAPIES SERIES
Discharge: HOME | End: 2022-06-02
Payer: COMMERCIAL

## 2022-06-02 PROCEDURE — 97016 VASOPNEUMATIC DEVICE THERAPY: CPT

## 2022-06-02 PROCEDURE — 97110 THERAPEUTIC EXERCISES: CPT

## 2022-06-02 NOTE — PROGRESS NOTES
[address] [facility]   [address]   Phone: [phone]   Fax: [phone]    Physical Therapy Daily Treatment Note      Date:  2022  Patient Name:  Cristal Ba    :  2000  MRN: [id number]  Physician: Shakira Soria:  [facility] secondary  Medical Diagnosis: (R) foot metatarsal fracture S92.301D; (L) calcaneus fracture S92.002D                   Rehab Codes: (R) foot pain M79.671; (L) foot pain M79.672  Onset date: referral 22             Next 's appt.: PRN  Visit Count:                                 Cancel/No Show: 0/0    Subjective:    Patient arrives stating she has been stretching a lot since evaluation and it seems to help but she still has pain. Pain:  [x] Yes  [] No Location:B feet/ankles Pain Rating: (0-10 scale) 4/10  Pain altered Tx:  [] No  [] Yes  Action:  Comments:    Objective:  Modalities: vaso compression 34* low pressure 10' B feet/ankles in long sitting   Precautions:  Exercises:  Exercise Reps/ Time Weight/ Level Completed  Today Comments   schwinn bike  5'  x Warm up    SB calf stretch  30\"x3  x    Heel/toe raises  10x2  x    Eccentric heel raise  15x  x 2 up, 1 down    BAPS PF/DF/IV/EV/CW/CCW 15x ea  lvl 2  x    Fitter board fwd/back/ 20x taps ea  x    Fitter board balance  30\"x2   x    Tandem balance        3 way hip  15x Red  x    Step ups fwd/lat  10x 8' x             Specific Instructions for next treatment: add tandem stance, squats, eccentric heel taps on step, AROM resisted       Assessment:  : Patient able to complete all standing exercises with minimal increase pain symptoms. Multiple cues required for proper technique, and postural awareness. Patient tends to rely on visual feedback with B feet placement so required cueing to correct. Ended with vasocompression for pain and edema control.  Patient education provided to continue with at home stretching exercises along with ice application to avoid increase swelling and pain. [x] Progressing toward goals. [] No change. [] Other:    [] Patient would continue to benefit from skilled physical therapy services in order to:  improve and normalize mobility, ambulation, function. STG: (to be met in 6 treatments)  1. ? Pain: Improved pain 0-1/10  2. ? ROM: (R) DF to 5 degrees  3. ? Strength: Able to do SL heel raise and 6\" stepdown without pain  4. Independent with Home Exercise Programs     LTG: (to be met in 12 treatments)  5. ? Pain: Improved pain 0/10  6. ? ROM: (R) DF to 10 degrees  7. ? Strength: Able to do SL heel raise and 8\" stepdown without pain, tandem balance 10 seconds (B)  8. Independent with Home Exercise Programs                 Patient goals: improving running tolerance    Pt. Education:  [x] Yes  [] No  [] Reviewed Prior HEP/Ed  Method of Education: [x] Verbal  [] Demo  [] Written  Comprehension of Education:  [x] Verbalizes understanding. [] Demonstrates understanding. [] Needs review. [] Demonstrates/verbalizes HEP/Ed previously given. Plan: [x] Continue per plan of care.    [] Other:      Treatment Charges: Mins Units   []  Modalities     []  Ther Exercise 35 2   []  Manual Therapy     []  Ther Activities     []  Aquatics     []  Neuromuscular     [] Vasocompression 10 1   [] Gait Training     [] Dry needling        [] 1 or 2 muscles        [] 3 or more muscles     []  Other     Total Treatment time 45 3     Time In:800am           Time Out: 845am    Electronically signed by:  Joshua Ernandez PTA

## 2022-06-06 ENCOUNTER — HOSPITAL ENCOUNTER (OUTPATIENT)
Dept: PHYSICAL THERAPY | Age: 22
Setting detail: THERAPIES SERIES
Discharge: HOME | End: 2022-06-06
Payer: COMMERCIAL

## 2022-06-06 PROCEDURE — 97110 THERAPEUTIC EXERCISES: CPT

## 2022-06-06 PROCEDURE — 97140 MANUAL THERAPY 1/> REGIONS: CPT

## 2022-06-08 ENCOUNTER — HOSPITAL ENCOUNTER (OUTPATIENT)
Dept: PHYSICAL THERAPY | Age: 22
Setting detail: THERAPIES SERIES
Discharge: HOME | End: 2022-06-08
Payer: COMMERCIAL

## 2022-06-08 PROCEDURE — 97140 MANUAL THERAPY 1/> REGIONS: CPT

## 2022-06-08 PROCEDURE — 97110 THERAPEUTIC EXERCISES: CPT

## 2022-06-08 PROCEDURE — 97112 NEUROMUSCULAR REEDUCATION: CPT

## 2022-06-08 PROCEDURE — 97016 VASOPNEUMATIC DEVICE THERAPY: CPT

## 2022-06-08 NOTE — PROGRESS NOTES
[facility]   [address]   Phone: [phone]   Fax: [phone]    Physical Therapy Daily Treatment Note      Date:  2022  Patient Name:  Venkatesh Chavez    :  2000  MRN: [id number]  Physician: Rosalee Hopkins                                    Insurance:  [employer] secondary  Medical Diagnosis: (R) foot metatarsal fracture S92.301D; (L) calcaneus fracture S92.002D                   Rehab Codes: (R) foot pain M79.671; (L) foot pain M79.672  Onset date: referral 22             Next Dr's appt.: PRN  Visit Count:                                 Cancel/No Show: 0/0    Subjective:      Pain:  [x] Yes  [] No Location:B feet/ankles  Pain Rating: (0-10 scale) 5/10  Pain altered Tx:  [] No  [] Yes  Action:  Comments: Patient reports today that she feels slight improvement in overall function of feet/ankles. Reported that pain is still relatively the same. Objective:  Modalities: vaso compression 34* low pressure 10' B feet/ankles in long sitting   Precautions:  Exercises:  Exercise Reps/ Time Weight/ Level Completed  Today Comments   Manual: Grade III A/P R ankle talocrural mobs in combination with DF/PF PROM/stretching 10 min total  x           Long Sitting Gastroc Stretch 3x30  x RLE   4-Way Ankle 20x ea Red x           schwinn bike  5'  x Warm up    SB calf stretch  30\"x3  x    Heel/toe raises  10x2      Eccentric heel raise  15x  x 2 up, 1 down    BAPS PF/DF/IV/EV/CW/CCW 15x ea  lvl 2  x    Fitter board fwd/back, Side/Side 20x taps ea  x    Fitter board balance  30\"x2   x    Tandem balance  3x30\" ea  x    3 way hip  15x Red  x    Step ups fwd/lat  10x 8\"     Squats 20x  x      Specific Instructions for next treatment: add eccentric heel taps on step      Assessment:      [x] Progressing toward goals. Initiated session with manual/long sitting gastroc stretch to promote R ankle DF and reduce general R ankle stiffness.

## 2022-06-10 NOTE — PROGRESS NOTES
[facility] Outpatient Physical Therapy   [address]   Phone: [phone]   Fax: [phone]    Physical Therapy Daily Treatment Note      Date:  2022  Patient Name:  Randy Conteh    :  2000  MRN: [id number]  Physician: Maura Díaz                                    Insurance:  [employer] secondary  Medical Diagnosis: (R) foot metatarsal fracture S92.301D; (L) calcaneus fracture S92.002D                   Rehab Codes: (R) foot pain M79.671; (L) foot pain M79.672  Onset date: referral 22             Next Dr's appt.: PRN  Visit Count: 3/12                                Cancel/No Show: 0/0    Subjective:    Patient more limited with ROM from the (L) side and notes mild pain in the midfoot and more limitation with WB on the (R). Pain:  [x] Yes  [] No Location:B feet/ankles Pain Rating: (0-10 scale) 4/10  Pain altered Tx:  [] No  [] Yes  Action:  Comments:    Objective:  Modalities: vaso compression 34* low pressure 10' B feet/ankles in long sitting   Precautions:  Exercises:  Exercise Reps/ Time Weight/ Level Completed  Today Comments   schwinn bike  5'  x Warm up- not included in billable time   SB calf stretch  30\"x3  x    Heel/toe raises  10x2  x    Eccentric heel raise  15x  x 2 up, 1 down    BAPS PF/DF/IV/EV/CW/CCW 15x ea  lvl 2  x    Fitter board fwd/back/ 20x taps ea  x    Fitter board balance  30\"x2   x    Tandem balance  10\" x 2 (B)  X    3 way hip  15x Red  x    Step ups fwd/lat  10x 8' x    Supine calf stretch 3 x 20\"  X      Specific Instructions for next treatment:  Manual- 10' posterior talar glide, PROM (B). Assessment:  : Most limited with ambulation on the (L) side. Added supine stretching and reviewed this for HEP to improve DF mobility, more limited (L) versus (R). Also discussed progression of balance activities to help patient with WB tolerance in the future. [x] Progressing toward goals. [] No change.      [] Other:    [] Patient would continue to benefit from skilled physical therapy services in order to:  improve and normalize mobility, ambulation, function. STG: (to be met in 6 treatments)  1. ? Pain: Improved pain 0-1/10  2. ? ROM: (R) DF to 5 degrees  3. ? Strength: Able to do SL heel raise and 6\" stepdown without pain  4. Independent with Home Exercise Programs     LTG: (to be met in 12 treatments)  5. ? Pain: Improved pain 0/10  6. ? ROM: (R) DF to 10 degrees  7. ? Strength: Able to do SL heel raise and 8\" stepdown without pain, tandem balance 10 seconds (B)  8. Independent with Home Exercise Programs                 Patient goals: improving running tolerance    Pt. Education:  [x] Yes  [] No  [] Reviewed Prior HEP/Ed  Method of Education: [x] Verbal  [] Demo  [] Written  Comprehension of Education:  [x] Verbalizes understanding. [] Demonstrates understanding. [] Needs review. [] Demonstrates/verbalizes HEP/Ed previously given. Plan: [x] Continue per plan of care.    [] Other:      Treatment Charges: Mins Units   []  Modalities     []  Ther Exercise 35 2   []  Manual Therapy 10 1   []  Ther Activities     []  Aquatics     []  Neuromuscular     [] Vasocompression     [] Gait Training     [] Dry needling        [] 1 or 2 muscles        [] 3 or more muscles     []  Other     Total Treatment time 45 3     Time In:0930am           Time Out: 1015AM    Electronically signed by:  Luiz Jacobson, PT

## 2022-06-13 ENCOUNTER — HOSPITAL ENCOUNTER (OUTPATIENT)
Dept: PHYSICAL THERAPY | Age: 22
Setting detail: THERAPIES SERIES
Discharge: HOME | End: 2022-06-13
Payer: COMMERCIAL

## 2022-06-13 PROCEDURE — 97110 THERAPEUTIC EXERCISES: CPT

## 2022-06-13 PROCEDURE — 97016 VASOPNEUMATIC DEVICE THERAPY: CPT

## 2022-06-13 PROCEDURE — 97112 NEUROMUSCULAR REEDUCATION: CPT

## 2022-06-13 PROCEDURE — 97140 MANUAL THERAPY 1/> REGIONS: CPT

## 2022-06-13 NOTE — PROGRESS NOTES
[address] Outpatient Physical Therapy   [address]   Phone: [phone]   Fax: [phone]    Physical Therapy Daily Treatment Note      Date:  2022  Patient Name:  Mark Mathews    :  2000  MRN: [id number]  Physician: Riri Dean                                    Insurance:  [employer] secondary  Medical Diagnosis: (R) foot metatarsal fracture S92.301D; (L) calcaneus fracture S92.002D                   Rehab Codes: (R) foot pain M79.671; (L) foot pain M79.672  Onset date: referral 22             Next Dr's appt.: PRN  Visit Count:                                 Cancel/No Show: 0/0    Subjective:      Pain:  [x] Yes  [] No Location:B feet/ankles  R>L Pain Rating: (0-10 scale) 1-2/10  Pain altered Tx:  [] No  [] Yes  Action:  Comments: Patient reports today that pain in B feel continue to improve. Reported that stiffness in R ankle is also improving and requested to measure DF to see if she has made any progress. Objective:  Modalities: vaso compression 34* low pressure 10' B feet/ankles in long sitting   Precautions:  Exercises:  Exercise Reps/ Time Weight/ Level Completed  Today Comments   Manual: Grade III A/P R ankle talocrural mobs in combination with DF/PF PROM/stretching 10 min total  x           Long Sitting Gastroc Stretch 3x30  x RLE   4-Way Ankle 20x ea Red x           schwinn bike  5'   Warm up    SB calf stretch  30\"x3  x    Heel/toe raises  10x2      Eccentric heel raise  15x  x 2 up, 1 down    BAPS PF/DF/IV/EV/CW/CCW 20x ea  lvl 2  x    Fitter board fwd/back, Side/Side 20x taps ea  x    Fitter board balance  30\"x2   x    Tandem balance  3x30\" ea  x    3 way hip  15x Red  x    Step ups fwd/lat  10x 8\"     Heel Taps 20x ea 2\" x    Squats 20x  x      Specific Instructions for next treatment:     R ankle DF AROM: 3 upon arrival, 6 following manual     Assessment:      [x] Progressing toward goals.  Notable improvement in R ankle DF as measured today. Continued to work on BLE strength and neuromuscular control. Encouraged min UE assistance with fitter and BAPS exercises to maximize challenge to muscle control, while still performing exercise with proper mechanics. Added heel taps for additional LE strengthening and challenge to muscle control required to improve. Slight increased heel pain with LLE heel taps. Vaso for pain management following exercises today. [] No change. [] Other:    [] Patient would continue to benefit from skilled physical therapy services in order to:  improve and normalize mobility, ambulation, function. STG: (to be met in 6 treatments)  1. ? Pain: Improved pain 0-1/10  2. ? ROM: (R) DF to 5 degrees  3. ? Strength: Able to do SL heel raise and 6\" stepdown without pain  4. Independent with Home Exercise Programs     LTG: (to be met in 12 treatments)  5. ? Pain: Improved pain 0/10  6. ? ROM: (R) DF to 10 degrees  7. ? Strength: Able to do SL heel raise and 8\" stepdown without pain, tandem balance 10 seconds (B)  8. Independent with Home Exercise Programs                 Patient goals: improving running tolerance    Pt. Education:  [x] Yes  [] No  [x] Reviewed Prior HEP/Ed  Method of Education: [x] Verbal  [] Demo  [] Written  Comprehension of Education:  [x] Verbalizes understanding. [x] Demonstrates understanding. [] Needs review. [] Demonstrates/verbalizes HEP/Ed previously given. Plan: [x] Continue per plan of care.    [] Other:      Treatment Charges: Mins Units   []  Modalities     [x]  Ther Exercise 30 1   [x]  Manual Therapy 8 1   []  Ther Activities     []  Aquatics     [x]  Neuromuscular 10 1   [x] Vasocompression 15 1   [] Gait Training     [] Dry needling        [] 1 or 2 muscles        [] 3 or more muscles     []  Other     Total Treatment time 63 4     Time In: 8:00 am          Time Out: 9:03 am     Electronically signed by:  Cammy Higgins PTA

## 2022-06-15 ENCOUNTER — HOSPITAL ENCOUNTER (OUTPATIENT)
Dept: PHYSICAL THERAPY | Age: 22
Setting detail: THERAPIES SERIES
Discharge: HOME | End: 2022-06-15
Payer: COMMERCIAL

## 2022-06-15 PROCEDURE — 97110 THERAPEUTIC EXERCISES: CPT

## 2022-06-15 PROCEDURE — 97112 NEUROMUSCULAR REEDUCATION: CPT

## 2022-06-15 PROCEDURE — 97140 MANUAL THERAPY 1/> REGIONS: CPT

## 2022-06-15 NOTE — PROGRESS NOTES
[address]  Outpatient Physical Therapy   [address]   Phone: [phone]   Fax: [phone]    Physical Therapy Daily Treatment Note      Date:  6/15/2022  Patient Name:  Tobi Torres    :  2000  MRN: [id number]  Physician: Carlee Kramer                                    Insurance:  [facility] secondary  Medical Diagnosis: (R) foot metatarsal fracture S92.301D; (L) calcaneus fracture S92.002D                   Rehab Codes: (R) foot pain M79.671; (L) foot pain M79.672  Onset date: referral 22             Next Dr's appt.: PRN  Visit Count:                                 Cancel/No Show: 0/0    Subjective:      Pain:  [x] Yes  [] No Location:B feet/ankles  R>L Pain Rating: (0-10 scale) 1-2/10  Pain altered Tx:  [] No  [] Yes  Action:  Comments: Patient reports today that B feet are feeling well overall. Patient denied any pain upon arrival, only mild stiffness in R ankle. Objective:  Modalities: vaso compression 34* low pressure 10' B feet/ankles in long sitting   Precautions:  Exercises:  Exercise Reps/ Time Weight/ Level Completed  Today Comments   Manual: Grade III A/P R ankle talocrural mobs in combination with DF/PF PROM/stretching 10 min total  x           Long Sitting Gastroc Stretch 3x30   RLE   4-Way Ankle 20x ea Red x           schwinn bike  5'   Warm up    SB calf stretch  30\"x3  x    Heel/toe raises  10x2      Eccentric heel raise  15x  x 2 up, 1 down    BAPS PF/DF/IV/EV/CW/CCW 20x ea  lvl 2  x    Fitter board fwd/back, Side/Side 20x taps ea  x    Fitter board balance  30\"x2 ea  x Ea board position    Tandem balance  3x30\" ea      3 way hip  15x Red  x    Step ups fwd/lat with 2-3\" SLS at top 15x 8\"     Heel Taps 20x ea 2\" x    Squats 20x  x      Specific Instructions for next treatment:       Assessment:      [x] Progressing toward goals. Patient continued to demonstrate general stiffness, especially with PF.  Able to improve stiffness with manual. Continued treatment with exercises to improve BLE strength and neuromuscular control. Encouraged min UE assistance with 3-way hip and added 2-3\" SLS to further challenge LE muscle control. Patient reported no change in pain following exercises today. [] No change. [] Other:    [] Patient would continue to benefit from skilled physical therapy services in order to:  improve and normalize mobility, ambulation, function. STG: (to be met in 6 treatments)  1. ? Pain: Improved pain 0-1/10  2. ? ROM: (R) DF to 5 degrees  3. ? Strength: Able to do SL heel raise and 6\" stepdown without pain  4. Independent with Home Exercise Programs     LTG: (to be met in 12 treatments)  5. ? Pain: Improved pain 0/10  6. ? ROM: (R) DF to 10 degrees  7. ? Strength: Able to do SL heel raise and 8\" stepdown without pain, tandem balance 10 seconds (B)  8. Independent with Home Exercise Programs                 Patient goals: improving running tolerance    Pt. Education:  [x] Yes  [] No  [x] Reviewed Prior HEP/Ed  Method of Education: [x] Verbal  [] Demo  [] Written  Comprehension of Education:  [x] Verbalizes understanding. [x] Demonstrates understanding. [] Needs review. [] Demonstrates/verbalizes HEP/Ed previously given. Plan: [x] Continue per plan of care.    [] Other:      Treatment Charges: Mins Units   []  Modalities     [x]  Ther Exercise 27 1   [x]  Manual Therapy 8 1   []  Ther Activities     []  Aquatics     [x]  Neuromuscular 10 1   [] Vasocompression     [] Gait Training     [] Dry needling        [] 1 or 2 muscles        [] 3 or more muscles     []  Other     Total Treatment time 45 3     Time In: 9:30 am          Time Out: 10:15 am     Electronically signed by:  Janette Polanco PTA

## 2022-06-20 ENCOUNTER — HOSPITAL ENCOUNTER (OUTPATIENT)
Dept: PHYSICAL THERAPY | Age: 22
Setting detail: THERAPIES SERIES
Discharge: HOME | End: 2022-06-20
Payer: COMMERCIAL

## 2022-06-20 PROCEDURE — 97140 MANUAL THERAPY 1/> REGIONS: CPT

## 2022-06-20 PROCEDURE — 97110 THERAPEUTIC EXERCISES: CPT

## 2022-06-20 NOTE — PROGRESS NOTES
[facility] Outpatient Physical Therapy   [address] [place] [address] [address]   Phone: [phone]   Fax: [phone]    Physical Therapy Daily Treatment Note      Date:  2022  Patient Name:  Dina Garay    :  2000  MRN: [id number]  Physician: Jordan Bunn                                    Insurance:  [facility] [employer] secondary  Medical Diagnosis: (R) foot metatarsal fracture S92.301D; (L) calcaneus fracture S92.002D                   Rehab Codes: (R) foot pain M79.671; (L) foot pain M79.672  Onset date: referral 22             Next Dr's appt.: PRN  Visit Count:                                 Cancel/No Show: 0/0    Subjective:      Pain:  [x] Yes  [] No Location:B feet/ankles  R>L Pain Rating: (0-10 scale) 1-2/10  Pain altered Tx:  [] No  [] Yes  Action:  Comments: Patient reporting improved overall pain in the (R) and (L) pain. Patient noting improved overall WB tolerance and some improvement of mobility overall since therapy, still noting balance and strength issues per the patient.     Objective:  Modalities: vaso compression 34* low pressure 10' B feet/ankles in long sitting   Precautions:  Exercises:  Exercise Reps/ Time Weight/ Level Completed  Today Comments   Manual: Grade III A/P R ankle talocrural mobs in combination with DF/PF PROM/stretching 10 min total  x           Long Sitting Gastroc Stretch 3x30  X RLE   4-Way Ankle 20x ea Red x           schwinn bike  5'  X Warm up    SB calf stretch  30\"x3  x    Heel/toe raises  10x2      Eccentric heel raise  15x  x 2 up, 1 down    BAPS PF/DF/IV/EV/CW/CCW 20x ea  lvl 2  x    Fitter board fwd/back, Side/Side 20x taps ea  x    Fitter board balance  30\"x2 ea  x Ea board position    Tandem balance  3x30\" ea  X foam   3 way hip  15x Red  x    Step ups fwd/lat with 2-3\" SLS at top 15x 8\" X    Heel Taps 20x ea 2\" x    Squats 20x  x      Specific Instructions for next treatment:       Assessment:      [x] Progressing toward goals. Patient added back in the tandem balance, but did with foam today to increase level of challenge. Discussed with patient continuing for about 2 weeks till the end of her plan of care and then reevaluating need for additional therapy at that time. Plan possible progression next treatment of heel taps (increase stair level) and complexity of balance exercises. Needed feedback for proper form for tap downs today, was putting full weight when stepping down- but was able to correct with verbal cueing. [] No change. [] Other:    [x] Patient would continue to benefit from skilled physical therapy services in order to:  improve and normalize mobility, ambulation, function. STG: (to be met in 6 treatments)  1. ? Pain: Improved pain 0-1/10  2. ? ROM: (R) DF to 5 degrees  3. ? Strength: Able to do SL heel raise and 6\" stepdown without pain  4. Independent with Home Exercise Programs     LTG: (to be met in 12 treatments)  5. ? Pain: Improved pain 0/10  6. ? ROM: (R) DF to 10 degrees  7. ? Strength: Able to do SL heel raise and 8\" stepdown without pain, tandem balance 10 seconds (B)  8. Independent with Home Exercise Programs                 Patient goals: improving running tolerance    Pt. Education:  [x] Yes  [] No  [x] Reviewed Prior HEP/Ed  Method of Education: [x] Verbal  [] Demo  [] Written  Comprehension of Education:  [x] Verbalizes understanding. [x] Demonstrates understanding. [] Needs review. [] Demonstrates/verbalizes HEP/Ed previously given. Plan: [x] Continue per plan of care.    [] Other:      Treatment Charges: Mins Units   []  Modalities     [x]  Ther Exercise 35 2   [x]  Manual Therapy 10 1   []  Ther Activities     []  Aquatics     [x]  Neuromuscular     [] Vasocompression     [] Gait Training     [] Dry needling        [] 1 or 2 muscles        [] 3 or more muscles     []  Other     Total Treatment time 45 3     Time In: 0800 am          Time Out: 0845 am     Electronically signed by:  Jeremías Wood, PT

## 2022-06-22 ENCOUNTER — APPOINTMENT (OUTPATIENT)
Dept: PHYSICAL THERAPY | Age: 22
End: 2022-06-22
Payer: COMMERCIAL

## 2022-06-24 ENCOUNTER — HOSPITAL ENCOUNTER (OUTPATIENT)
Dept: PHYSICAL THERAPY | Age: 22
Setting detail: THERAPIES SERIES
Discharge: HOME | End: 2022-06-24
Payer: COMMERCIAL

## 2022-06-24 PROCEDURE — 97140 MANUAL THERAPY 1/> REGIONS: CPT

## 2022-06-24 PROCEDURE — 97110 THERAPEUTIC EXERCISES: CPT

## 2022-06-24 PROCEDURE — 97112 NEUROMUSCULAR REEDUCATION: CPT

## 2022-06-24 NOTE — PROGRESS NOTES
[address] Out[facility]   [address]   Phone: [phone]   Fax: [phone]    Physical Therapy Daily Treatment Note      Date:  2022  Patient Name:  Chela Mallory    :  2000  MRN: [id number]  Physician: Neftali Lux                                    Insurance:  [facility] secondary  Medical Diagnosis: (R) foot metatarsal fracture S92.301D; (L) calcaneus fracture S92.002D                   Rehab Codes: (R) foot pain M79.671; (L) foot pain M79.672  Onset date: referral 22             Next Dr's appt.: PRN  Visit Count:                                 Cancel/No Show: 0/0    Subjective:      Pain:  [] Yes  [x] No Location:B feet/ankles  R>L Pain Rating: (0-10 scale) 0/10  Pain altered Tx:  [] No  [] Yes  Action:  Comments: Patient reports today that feet/ankles feel well overall. Patient denies any pain upon arrival today. Objective:  Modalities: vaso compression 34* low pressure 10' B feet/ankles in long sitting   Precautions:  Exercises:  Exercise Reps/ Time Weight/ Level Completed  Today Comments   Manual: Grade III A/P R ankle talocrural mobs in combination with DF/PF PROM/stretching 10 min total  x           Long Sitting Gastroc Stretch 3x30  X RLE   4-Way Ankle 20x ea Red x           schwinn bike  5'  X Warm up    SB calf stretch  30\"x3  x    Heel/toe raises  10x2      Eccentric heel raise  15x  x 2 up, 1 down    BAPS PF/DF/IV/EV/CW/CCW 20x ea  lvl 2  x    Fitter board fwd/back, Side/Side 20x taps ea  x    Fitter board balance  30\"x2 ea  x Ea board position    Tandem balance  3x30\" ea  X foam   3 way hip  15x Red  x    Step ups fwd/lat with 2-3\" SLS at top 15x 8\" X    Heel Taps 10x2 ea 4\" x    Squats 20x        Specific Instructions for next treatment:       Assessment:      [x] Progressing toward goals.  Patient ambulated into gym with less rigid gait pattern compared to previous sessions with provider, suggesting improved tolerance to WB on RLE. Encouraged minimal UE support with standing exercises to further challenge balance. Increased height of step ups with patient able to maintain proper mechanics with increased height. Patient hesitant to maintain SLS with step ups with minimal improvement in maintaining stance with cues. Patient may benefit from performing SLS without dynamic motion next visit. [] No change. [] Other:    [x] Patient would continue to benefit from skilled physical therapy services in order to:  improve and normalize mobility, ambulation, function. STG: (to be met in 6 treatments)  1. ? Pain: Improved pain 0-1/10  2. ? ROM: (R) DF to 5 degrees  3. ? Strength: Able to do SL heel raise and 6\" stepdown without pain  4. Independent with Home Exercise Programs     LTG: (to be met in 12 treatments)  5. ? Pain: Improved pain 0/10  6. ? ROM: (R) DF to 10 degrees  7. ? Strength: Able to do SL heel raise and 8\" stepdown without pain, tandem balance 10 seconds (B)  8. Independent with Home Exercise Programs                 Patient goals: improving running tolerance    Pt. Education:  [x] Yes  [] No  [x] Reviewed Prior HEP/Ed  Method of Education: [x] Verbal  [] Demo  [] Written  Comprehension of Education:  [x] Verbalizes understanding. [x] Demonstrates understanding. [] Needs review. [] Demonstrates/verbalizes HEP/Ed previously given. Plan: [x] Continue per plan of care.    [] Other:      Treatment Charges: Mins Units   []  Modalities     [x]  Ther Exercise 27 1   [x]  Manual Therapy 10 1   []  Ther Activities     []  Aquatics     [x]  Neuromuscular 10 1   [] Vasocompression     [] Gait Training     [] Dry needling        [] 1 or 2 muscles        [] 3 or more muscles     []  Other     Total Treatment time 47 3     Time In: 9:27 am          Time Out: 10:15 am     Electronically signed by:  Jose Francisco Dougherty PTA

## 2022-06-27 ENCOUNTER — HOSPITAL ENCOUNTER (OUTPATIENT)
Dept: PHYSICAL THERAPY | Age: 22
Setting detail: THERAPIES SERIES
Discharge: HOME | End: 2022-06-27
Payer: COMMERCIAL

## 2022-06-27 PROCEDURE — 97110 THERAPEUTIC EXERCISES: CPT

## 2022-06-27 PROCEDURE — 97140 MANUAL THERAPY 1/> REGIONS: CPT

## 2022-06-27 NOTE — PROGRESS NOTES
[address] [place] [address] Outpatient Physical Therapy   [address] [facility] [address]   Phone: [phone]   Fax: [phone]    Physical Therapy Daily Treatment Note      Date:  2022  Patient Name:  Missy Cuellar    :  2000  MRN: [id number]  Physician: Waleska Khan:  [facility] secondary  Medical Diagnosis: (R) foot metatarsal fracture S92.301D; (L) calcaneus fracture S92.002D                   Rehab Codes: (R) foot pain M79.671; (L) foot pain M79.672  Onset date: referral 22             Next 's appt.: PRN  Visit Count:                                 Cancel/No Show: 0/0    Subjective:      Pain:  [] Yes  [x] No Location:B feet/ankles  R>L Pain Rating: (0-10 scale) 0/10  Pain altered Tx:  [] No  [] Yes  Action:  Comments: Patient reports increased soreness today, unsure of reason why. Most sore with prolonged walking but states that stairs are generally OK for the patient.       Objective:  Modalities: vaso compression 34* low pressure 10' B feet/ankles in long sitting  HELD TODAY  Precautions:  Exercises:  Exercise Reps/ Time Weight/ Level Completed  Today Comments   Manual: Grade III A/P R ankle talocrural mobs in combination with DF/PF PROM/stretching 10 min total  x           Long Sitting Gastroc Stretch 3x30  X RLE   4-Way Ankle 20x ea Red x           schwinn bike  5'  X Warm up- not included in billable time   SB calf stretch  30\"x3  x    Heel/toe raises  10x2   FOAM ADDED    Eccentric heel raise  15x  x 2 up, 1 down    BAPS PF/DF/IV/EV/CW/CCW 20x ea  lvl 2      Fitter board fwd/back, Side/Side 20x taps ea      Fitter board balance  30\"x2 ea   Ea board position    Tandem balance  2x10\" ea  X foam   3 way hip  15x Red  x    Step ups fwd/lat with 2-3\" SLS at top 15x 8\" X    Heel Taps 10x2 ea 4\" x    Squats 20x  X FOAM ADDED    TANDEM balance throws FOAM 10x each 2# X    TANDEM balance UE pertubatrion FOAM, up/down; Vs 10x  2# X             Specific Instructions for next treatment:       Assessment:      [x] Progressing toward goals. Patient more limited and sore today upon entering. Worked on and added FOAM exercises as listed above to further progress work on balance. Was able to tolerate without increased pain in the LEs. DF mobility improving grossly at this time. [] No change. [] Other:    [x] Patient would continue to benefit from skilled physical therapy services in order to:  improve and normalize mobility, ambulation, function. STG: (to be met in 6 treatments)  1. ? Pain: Improved pain 0-1/10  2. ? ROM: (R) DF to 5 degrees  3. ? Strength: Able to do SL heel raise and 6\" stepdown without pain  4. Independent with Home Exercise Programs     LTG: (to be met in 12 treatments)  5. ? Pain: Improved pain 0/10  6. ? ROM: (R) DF to 10 degrees  7. ? Strength: Able to do SL heel raise and 8\" stepdown without pain, tandem balance 10 seconds (B)  8. Independent with Home Exercise Programs                 Patient goals: improving running tolerance    Pt. Education:  [x] Yes  [] No  [x] Reviewed Prior HEP/Ed  Method of Education: [x] Verbal  [] Demo  [] Written  Comprehension of Education:  [x] Verbalizes understanding. [x] Demonstrates understanding. [] Needs review. [] Demonstrates/verbalizes HEP/Ed previously given. Plan: [x] Continue per plan of care.    [] Other:      Treatment Charges: Mins Units   []  Modalities     [x]  Ther Exercise 35 2   [x]  Manual Therapy 10 1   []  Ther Activities     []  Aquatics     [x]  Neuromuscular     [] Vasocompression     [] Gait Training     [] Dry needling        [] 1 or 2 muscles        [] 3 or more muscles     []  Other     Total Treatment time 45 3     Time In: 0800 am          Time Out: 0845 am     Electronically signed by:  Mariana Mckeon PT

## 2022-07-01 ENCOUNTER — HOSPITAL ENCOUNTER (OUTPATIENT)
Dept: PHYSICAL THERAPY | Age: 22
Setting detail: THERAPIES SERIES
Discharge: HOME | End: 2022-07-01
Payer: COMMERCIAL

## 2022-07-01 PROCEDURE — 97110 THERAPEUTIC EXERCISES: CPT

## 2022-07-01 PROCEDURE — 97112 NEUROMUSCULAR REEDUCATION: CPT

## 2022-07-01 PROCEDURE — 97140 MANUAL THERAPY 1/> REGIONS: CPT

## 2022-07-01 NOTE — PROGRESS NOTES
[facility]   [address]   Phone: [phone]   Fax: [phone]    Physical Therapy Daily Treatment Note      Date:  2022  Patient Name:  Rachid Plaza    :  2000  MRN: [id number]  Physician: Isela Weems                                    Insurance:  [facility] secondary  Medical Diagnosis: (R) foot metatarsal fracture S92.301D; (L) calcaneus fracture S92.002D                   Rehab Codes: (R) foot pain M79.671; (L) foot pain M79.672  Onset date: referral 22             Next Dr's appt.: PRN  Visit Count: 10/12                                Cancel/No Show: 0/0    Subjective:      Pain:  [] Yes  [x] No Location:B feet/ankles  R>L Pain Rating: (0-10 scale) 0/10  Pain altered Tx:  [] No  [] Yes  Action:  Comments: Patient reports today that feet have been feeling well overall. Reported instance of sharp pain in L foot walking into gym that decreased after walking several few feet.      Objective:  Modalities: vaso compression 34* low pressure 10' B feet/ankles in long sitting  HELD TODAY  Precautions:  Exercises:  Exercise Reps/ Time Weight/ Level Completed  Today Comments   Manual: Grade III A/P R ankle talocrural mobs in combination with DF/PF PROM/stretching 8 min total  x           Long Sitting Gastroc Stretch 3x30  X RLE   4-Way Ankle 20x ea Green x           schwinn bike  5'   Warm up- not included in billable time   SB calf stretch  30\"x3  x    Heel/toe raises  10x2  x FOAM ADDED    Eccentric heel raise  15x  x 2 up, 1 down    BAPS PF/DF/IV/EV/CW/CCW 20x ea  lvl 2      Fitter board fwd/back, Side/Side 20x taps ea      Fitter board balance  30\"x2 ea   Ea board position    Tandem balance  2x10\" ea  X foam   3 way hip  15x Red  x    Step ups fwd/lat with 2-3\" SLS at top 15x 8\" X    Heel Taps 10x2 ea 4\" x    Squats 20x  X FOAM ADDED    TANDEM balance throws FOAM 20x each 2# X    TANDEM balance UE pertubatrion FOAM, up/down; Vs 20x ea 2# X             Specific Instructions for next treatment:       Assessment:      [x] Progressing toward goals. Continued to work on [employer], ROM, and muscle control. Increased resistance with 4-way ankle to increase strengthening potential of exercise. Patient with limited tolerance to eccentric heel raise on L with decreased motion, noting sharp pain. Patient continues to use finger tip support with balance exercises despite cues to minimize. Patient does demonstrate ability to maintain balance without UE support with tasks. [] No change. [] Other:    [x] Patient would continue to benefit from skilled physical therapy services in order to:  improve and normalize mobility, ambulation, function. STG: (to be met in 6 treatments)  1. ? Pain: Improved pain 0-1/10  2. ? ROM: (R) DF to 5 degrees  3. ? Strength: Able to do SL heel raise and 6\" stepdown without pain  4. Independent with Home Exercise Programs     LTG: (to be met in 12 treatments)  5. ? Pain: Improved pain 0/10  6. ? ROM: (R) DF to 10 degrees  7. ? Strength: Able to do SL heel raise and 8\" stepdown without pain, tandem balance 10 seconds (B)  8. Independent with Home Exercise Programs                 Patient goals: improving running tolerance    Pt. Education:  [x] Yes  [] No  [x] Reviewed Prior HEP/Ed  Method of Education: [x] Verbal  [] Demo  [] Written  Comprehension of Education:  [x] Verbalizes understanding. [x] Demonstrates understanding. [] Needs review. [] Demonstrates/verbalizes HEP/Ed previously given. Plan: [x] Continue per plan of care.    [] Other:      Treatment Charges: Mins Units   []  Modalities     [x]  Ther Exercise 40 2   [x]  Manual Therapy 8 1   []  Ther Activities     []  Aquatics     [x]  Neuromuscular     [] Vasocompression     [] Gait Training     [] Dry needling        [] 1 or 2 muscles        [] 3 or more muscles     []  Other     Total Treatment time 48 3     Time In: 7112 am Time Out: 9:33 am      Electronically signed by:  Alma Osborn PTA

## 2022-07-06 ENCOUNTER — HOSPITAL ENCOUNTER (OUTPATIENT)
Dept: PHYSICAL THERAPY | Age: 22
Setting detail: THERAPIES SERIES
Discharge: HOME | End: 2022-07-06
Payer: COMMERCIAL

## 2022-07-06 ENCOUNTER — HOSPITAL ENCOUNTER (EMERGENCY)
Age: 22
Discharge: HOME | End: 2022-07-06
Attending: EMERGENCY MEDICINE
Payer: COMMERCIAL

## 2022-07-06 ENCOUNTER — APPOINTMENT (OUTPATIENT)
Dept: CT IMAGING | Age: 22
End: 2022-07-06
Payer: COMMERCIAL

## 2022-07-06 VITALS
SYSTOLIC BLOOD PRESSURE: 123 MMHG | HEIGHT: 62 IN | RESPIRATION RATE: 14 BRPM | TEMPERATURE: 98 F | BODY MASS INDEX: 25.76 KG/M2 | WEIGHT: 140 LBS | DIASTOLIC BLOOD PRESSURE: 84 MMHG | OXYGEN SATURATION: 96 % | HEART RATE: 85 BPM

## 2022-07-06 DIAGNOSIS — R11.2 NON-INTRACTABLE VOMITING WITH NAUSEA, UNSPECIFIED VOMITING TYPE: Primary | ICD-10-CM

## 2022-07-06 DIAGNOSIS — N39.0 URINARY TRACT INFECTION WITH HEMATURIA, SITE UNSPECIFIED: ICD-10-CM

## 2022-07-06 DIAGNOSIS — R31.9 URINARY TRACT INFECTION WITH HEMATURIA, SITE UNSPECIFIED: ICD-10-CM

## 2022-07-06 DIAGNOSIS — N83.201 RIGHT OVARIAN CYST: ICD-10-CM

## 2022-07-06 LAB
-: ABNORMAL
ABSOLUTE EOS #: 0 K/UL (ref 0–0.4)
ABSOLUTE LYMPH #: 1.4 K/UL (ref 1.2–5.2)
ABSOLUTE MONO #: 0.8 K/UL (ref 0.1–1.3)
ALBUMIN SERPL-MCNC: 5.1 G/DL (ref 3.5–5.2)
ALP BLD-CCNC: 82 U/L (ref 35–104)
ALT SERPL-CCNC: 9 U/L (ref 5–33)
AMORPHOUS: ABNORMAL
ANION GAP SERPL CALCULATED.3IONS-SCNC: 16 MMOL/L (ref 9–17)
AST SERPL-CCNC: 15 U/L
BACTERIA: ABNORMAL
BASOPHILS # BLD: 0 % (ref 0–2)
BASOPHILS ABSOLUTE: 0 K/UL (ref 0–0.2)
BILIRUB SERPL-MCNC: 0.73 MG/DL (ref 0.3–1.2)
BILIRUBIN URINE: NEGATIVE
BUN BLDV-MCNC: 14 MG/DL (ref 6–20)
CALCIUM SERPL-MCNC: 9.9 MG/DL (ref 8.6–10.4)
CHLORIDE BLD-SCNC: 97 MMOL/L (ref 98–107)
CO2: 24 MMOL/L (ref 20–31)
COLOR: YELLOW
CREAT SERPL-MCNC: 0.63 MG/DL (ref 0.5–0.9)
EOSINOPHILS RELATIVE PERCENT: 0 % (ref 0–4)
EPITHELIAL CELLS UA: ABNORMAL /HPF
GFR AFRICAN AMERICAN: >60 ML/MIN
GFR NON-AFRICAN AMERICAN: >60 ML/MIN
GFR SERPL CREATININE-BSD FRML MDRD: ABNORMAL ML/MIN/{1.73_M2}
GLUCOSE BLD-MCNC: 141 MG/DL (ref 70–99)
GLUCOSE URINE: NEGATIVE
HCG(URINE) PREGNANCY TEST: NEGATIVE
HCT VFR BLD CALC: 41.8 % (ref 36–46)
HEMOGLOBIN: 14.4 G/DL (ref 12–16)
KETONES, URINE: ABNORMAL
LEUKOCYTE ESTERASE, URINE: ABNORMAL
LIPASE: 33 U/L (ref 13–60)
LYMPHOCYTES # BLD: 10 % (ref 25–45)
MAGNESIUM: 2.3 MG/DL (ref 1.6–2.6)
MCH RBC QN AUTO: 30.2 PG (ref 26–34)
MCHC RBC AUTO-ENTMCNC: 34.4 G/DL (ref 31–37)
MCV RBC AUTO: 87.7 FL (ref 80–100)
MONOCYTES # BLD: 6 % (ref 2–8)
MUCUS: ABNORMAL
NITRITE, URINE: NEGATIVE
PDW BLD-RTO: 12.4 % (ref 11.5–14.9)
PH UA: 6 (ref 5–8)
PLATELET # BLD: 349 K/UL (ref 150–450)
PMV BLD AUTO: 7.9 FL (ref 6–12)
POTASSIUM SERPL-SCNC: 3.3 MMOL/L (ref 3.7–5.3)
PROTEIN UA: ABNORMAL
RBC # BLD: 4.76 M/UL (ref 4–5.2)
RBC UA: ABNORMAL /HPF
SEG NEUTROPHILS: 84 % (ref 34–64)
SEGMENTED NEUTROPHILS ABSOLUTE COUNT: 10.9 K/UL (ref 1.3–9.1)
SODIUM BLD-SCNC: 137 MMOL/L (ref 135–144)
SPECIFIC GRAVITY UA: 1.03 (ref 1–1.03)
TOTAL PROTEIN: 8.8 G/DL (ref 6.4–8.3)
TURBIDITY: ABNORMAL
URINE HGB: NEGATIVE
UROBILINOGEN, URINE: NORMAL
WBC # BLD: 13.1 K/UL (ref 4.5–13.5)
WBC UA: ABNORMAL /HPF

## 2022-07-06 PROCEDURE — 83690 ASSAY OF LIPASE: CPT

## 2022-07-06 PROCEDURE — 2580000003 HC RX 258: Performed by: EMERGENCY MEDICINE

## 2022-07-06 PROCEDURE — 6360000002 HC RX W HCPCS: Performed by: EMERGENCY MEDICINE

## 2022-07-06 PROCEDURE — 83735 ASSAY OF MAGNESIUM: CPT

## 2022-07-06 PROCEDURE — 96376 TX/PRO/DX INJ SAME DRUG ADON: CPT

## 2022-07-06 PROCEDURE — 36415 COLL VENOUS BLD VENIPUNCTURE: CPT

## 2022-07-06 PROCEDURE — 81025 URINE PREGNANCY TEST: CPT

## 2022-07-06 PROCEDURE — 80053 COMPREHEN METABOLIC PANEL: CPT

## 2022-07-06 PROCEDURE — 96375 TX/PRO/DX INJ NEW DRUG ADDON: CPT

## 2022-07-06 PROCEDURE — 6360000004 HC RX CONTRAST MEDICATION: Performed by: EMERGENCY MEDICINE

## 2022-07-06 PROCEDURE — 81001 URINALYSIS AUTO W/SCOPE: CPT

## 2022-07-06 PROCEDURE — 74177 CT ABD & PELVIS W/CONTRAST: CPT

## 2022-07-06 PROCEDURE — 85025 COMPLETE CBC W/AUTO DIFF WBC: CPT

## 2022-07-06 PROCEDURE — 96374 THER/PROPH/DIAG INJ IV PUSH: CPT

## 2022-07-06 PROCEDURE — 6370000000 HC RX 637 (ALT 250 FOR IP): Performed by: EMERGENCY MEDICINE

## 2022-07-06 PROCEDURE — 99285 EMERGENCY DEPT VISIT HI MDM: CPT

## 2022-07-06 RX ORDER — IBUPROFEN 800 MG/1
800 TABLET ORAL EVERY 8 HOURS PRN
Qty: 30 TABLET | Refills: 0 | Status: ON HOLD | OUTPATIENT
Start: 2022-07-06 | End: 2023-02-18 | Stop reason: HOSPADM

## 2022-07-06 RX ORDER — POTASSIUM CHLORIDE 20 MEQ/1
40 TABLET, EXTENDED RELEASE ORAL ONCE
Status: COMPLETED | OUTPATIENT
Start: 2022-07-06 | End: 2022-07-06

## 2022-07-06 RX ORDER — 0.9 % SODIUM CHLORIDE 0.9 %
1000 INTRAVENOUS SOLUTION INTRAVENOUS ONCE
Status: COMPLETED | OUTPATIENT
Start: 2022-07-06 | End: 2022-07-06

## 2022-07-06 RX ORDER — ONDANSETRON 4 MG/1
4 TABLET, ORALLY DISINTEGRATING ORAL EVERY 8 HOURS PRN
Qty: 20 TABLET | Refills: 0 | Status: SHIPPED | OUTPATIENT
Start: 2022-07-06

## 2022-07-06 RX ORDER — SULFAMETHOXAZOLE AND TRIMETHOPRIM 800; 160 MG/1; MG/1
1 TABLET ORAL 2 TIMES DAILY
Qty: 20 TABLET | Refills: 0 | Status: SHIPPED | OUTPATIENT
Start: 2022-07-06 | End: 2022-07-16

## 2022-07-06 RX ORDER — MORPHINE SULFATE 4 MG/ML
4 INJECTION, SOLUTION INTRAMUSCULAR; INTRAVENOUS ONCE
Status: COMPLETED | OUTPATIENT
Start: 2022-07-06 | End: 2022-07-06

## 2022-07-06 RX ORDER — 0.9 % SODIUM CHLORIDE 0.9 %
80 INTRAVENOUS SOLUTION INTRAVENOUS ONCE
Status: COMPLETED | OUTPATIENT
Start: 2022-07-06 | End: 2022-07-06

## 2022-07-06 RX ORDER — PROMETHAZINE HYDROCHLORIDE 25 MG/1
25 TABLET ORAL 4 TIMES DAILY PRN
Qty: 20 TABLET | Refills: 0 | Status: SHIPPED | OUTPATIENT
Start: 2022-07-06 | End: 2022-07-13

## 2022-07-06 RX ORDER — ONDANSETRON 2 MG/ML
4 INJECTION INTRAMUSCULAR; INTRAVENOUS ONCE
Status: COMPLETED | OUTPATIENT
Start: 2022-07-06 | End: 2022-07-06

## 2022-07-06 RX ORDER — METOCLOPRAMIDE HYDROCHLORIDE 5 MG/ML
10 INJECTION INTRAMUSCULAR; INTRAVENOUS ONCE
Status: COMPLETED | OUTPATIENT
Start: 2022-07-06 | End: 2022-07-06

## 2022-07-06 RX ORDER — SODIUM CHLORIDE 0.9 % (FLUSH) 0.9 %
10 SYRINGE (ML) INJECTION PRN
Status: DISCONTINUED | OUTPATIENT
Start: 2022-07-06 | End: 2022-07-06 | Stop reason: HOSPADM

## 2022-07-06 RX ORDER — DIPHENHYDRAMINE HYDROCHLORIDE 50 MG/ML
25 INJECTION INTRAMUSCULAR; INTRAVENOUS ONCE
Status: COMPLETED | OUTPATIENT
Start: 2022-07-06 | End: 2022-07-06

## 2022-07-06 RX ADMIN — MORPHINE SULFATE 4 MG: 4 INJECTION, SOLUTION INTRAMUSCULAR; INTRAVENOUS at 08:20

## 2022-07-06 RX ADMIN — ONDANSETRON 4 MG: 2 INJECTION INTRAMUSCULAR; INTRAVENOUS at 06:41

## 2022-07-06 RX ADMIN — ONDANSETRON 4 MG: 2 INJECTION INTRAMUSCULAR; INTRAVENOUS at 08:19

## 2022-07-06 RX ADMIN — SODIUM CHLORIDE 1000 ML: 9 INJECTION, SOLUTION INTRAVENOUS at 06:40

## 2022-07-06 RX ADMIN — METOCLOPRAMIDE HYDROCHLORIDE 10 MG: 5 INJECTION INTRAMUSCULAR; INTRAVENOUS at 07:22

## 2022-07-06 RX ADMIN — MORPHINE SULFATE 4 MG: 4 INJECTION, SOLUTION INTRAMUSCULAR; INTRAVENOUS at 06:42

## 2022-07-06 RX ADMIN — DIPHENHYDRAMINE HYDROCHLORIDE 25 MG: 50 INJECTION, SOLUTION INTRAMUSCULAR; INTRAVENOUS at 07:21

## 2022-07-06 RX ADMIN — POTASSIUM CHLORIDE 40 MEQ: 1500 TABLET, EXTENDED RELEASE ORAL at 07:36

## 2022-07-06 RX ADMIN — IOPAMIDOL 75 ML: 755 INJECTION, SOLUTION INTRAVENOUS at 07:50

## 2022-07-06 RX ADMIN — SODIUM CHLORIDE, PRESERVATIVE FREE 10 ML: 5 INJECTION INTRAVENOUS at 07:50

## 2022-07-06 RX ADMIN — SODIUM CHLORIDE 80 ML: 9 INJECTION, SOLUTION INTRAVENOUS at 07:50

## 2022-07-06 ASSESSMENT — ENCOUNTER SYMPTOMS
VOMITING: 1
EYE PAIN: 0
BACK PAIN: 0
COLOR CHANGE: 0
ABDOMINAL PAIN: 1
NAUSEA: 1
SHORTNESS OF BREATH: 0

## 2022-07-06 ASSESSMENT — PAIN SCALES - GENERAL
PAINLEVEL_OUTOF10: 7
PAINLEVEL_OUTOF10: 8
PAINLEVEL_OUTOF10: 7

## 2022-07-06 ASSESSMENT — PAIN - FUNCTIONAL ASSESSMENT: PAIN_FUNCTIONAL_ASSESSMENT: 0-10

## 2022-07-06 ASSESSMENT — PAIN DESCRIPTION - LOCATION: LOCATION: THROAT;ABDOMEN

## 2022-07-06 NOTE — ED NOTES
Per CT technician - Pt is back in the ED from her scans and requesting more morphine.  MD notified      Nat Moyer RN  07/06/22 8678

## 2022-07-06 NOTE — PROGRESS NOTES
[facility]   [address]   Phone: [phone]   Fax: [phone]    Physical Therapy Cxl/NS      Date:  2022  Patient Name:  Josie Mohamud    :  2000  MRN: [id number]  Physician: Shraon Dykes:  [facility] secondary  Medical Diagnosis: (R) foot metatarsal fracture S92.301D; (L) calcaneus fracture S92.002D                   Rehab Codes: (R) foot pain M79.671; (L) foot pain M79.672  Onset date: referral 22             Next Dr's appt.: PRN  Visit Count: 10/12                                Cancel/No Show: 1/0    Cancelled due to illness today. On schedule for Friday.     Madai Askew PT
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
## 2022-07-06 NOTE — ED PROVIDER NOTES
FACULTY SIGN-OUT  ADDENDUM     Care of this patient was assumed from Dr. Tyron Lucas. The patient was seen for Abdominal Pain, Emesis, and Nausea  . The patient's initial evaluation and plan have been discussed with the prior provider who initially evaluated the patient. Nursing Notes, Past Medical Hx, Past Surgical Hx, Social Hx, Allergies, and Family Hx were all reviewed.       ED COURSE      The patient was given the following medications:  Orders Placed This Encounter   Medications    0.9 % sodium chloride bolus    morphine sulfate (PF) injection 4 mg    ondansetron (ZOFRAN) injection 4 mg    metoclopramide (REGLAN) injection 10 mg    diphenhydrAMINE (BENADRYL) injection 25 mg    potassium chloride (KLOR-CON M) extended release tablet 40 mEq    sodium chloride flush 0.9 % injection 10 mL    iopamidol (ISOVUE-370) 76 % injection 75 mL    0.9 % sodium chloride bolus    sulfamethoxazole-trimethoprim (BACTRIM DS) 800-160 MG per tablet     Sig: Take 1 tablet by mouth 2 times daily for 10 days     Dispense:  20 tablet     Refill:  0    morphine sulfate (PF) injection 4 mg    ondansetron (ZOFRAN) injection 4 mg    ondansetron (ZOFRAN ODT) 4 MG disintegrating tablet     Sig: Take 1 tablet by mouth every 8 hours as needed for Nausea     Dispense:  20 tablet     Refill:  0       Labs Reviewed   URINALYSIS WITH REFLEX TO CULTURE - Abnormal; Notable for the following components:       Result Value    Turbidity UA Turbid (*)     Ketones, Urine 4+ (*)     Protein, UA 2+ (*)     Leukocyte Esterase, Urine TRACE (*)     All other components within normal limits   COMPREHENSIVE METABOLIC PANEL W/ REFLEX TO MG FOR LOW K - Abnormal; Notable for the following components:    Glucose 141 (*)     Potassium 3.3 (*)     Chloride 97 (*)     Total Protein 8.8 (*)     All other components within normal limits   CBC WITH AUTO DIFFERENTIAL - Abnormal; Notable for the following components:    Seg Neutrophils 84 (*)     Lymphocytes 10 (*)     Segs Absolute 10.90 (*)     All other components within normal limits   MICROSCOPIC URINALYSIS - Abnormal; Notable for the following components:    Bacteria, UA FEW (*)     Mucus, UA 2+ (*)     Amorphous, UA 2+ (*)     All other components within normal limits   PREGNANCY, URINE   LIPASE   MAGNESIUM   Labs reviewed. CT ABDOMEN PELVIS W IV CONTRAST Additional Contrast? None    Result Date: 7/6/2022  EXAMINATION: CT OF THE ABDOMEN AND PELVIS WITH CONTRAST 7/6/2022 7:34 am TECHNIQUE: CT of the abdomen and pelvis was performed with the administration of intravenous contrast. Multiplanar reformatted images are provided for review. Automated exposure control, iterative reconstruction, and/or weight based adjustment of the mA/kV was utilized to reduce the radiation dose to as low as reasonably achievable. COMPARISON: 12/09/2021 HISTORY: ORDERING SYSTEM PROVIDED HISTORY: pain TECHNOLOGIST PROVIDED HISTORY: pain Decision Support Exception - unselect if not a suspected or confirmed emergency medical condition->Emergency Medical Condition (MA) Reason for Exam: abdominal pain, nausea and vomiting FINDINGS: Lower Chest: The visualized heart and lungs show no acute abnormalities. Organs: Liver consistent, pancreas, kidneys, adrenal glands and gallbladder show no significant abnormalities. GI/Bowel: There is limited evaluation due to absence of oral contrast. The stomach shows no focal lesions. Small bowel loops normal in caliber showing no focal abnormalities. Normal appendix. Evaluation of the colon shows no acute process. Pelvis: Urinary bladder grossly normal.  Unremarkable uterus. There is 6.2 cm right adnexal cyst. Peritoneum/Retroperitoneum: No free fluid or significant lymphadenopathy. Bones/Soft Tissues: No acute abnormality of the bones. The superficial soft tissues show no significant abnormalities. 1. No acute infective or inflammatory process. 2. No bowel obstruction.  3. A 6.2 cm simple appearing right adnexal cyst.  Recommend follow-up pelvic ultrasound in 3-6 months. RECOMMENDATIONS: 6.2 cm right adnexal simple-appearing cyst. Recommend follow-up pelvic US in 3-6 months. Reference: JACR 2020 Feb;17(2):248-254       RECENT VITALS:   Temp: 98 °F (36.7 °C), Heart Rate: 61, Resp: 15, BP: 128/83    MEDICAL DECISION MAKING    I did go over results with patient. We talked about follow-up for ultrasound. She is agreeable. We will go ahead and write for nausea medicine and I did discuss the importance of taking ibuprofen for this as well.     Impression: Right ovarian cyst, UTI    Disposition: Violetta Mobley MD  Emergency Medicine Attending      Carmela Virk MD  07/06/22 9873

## 2022-07-06 NOTE — ED NOTES
Report given to Garth Hanks RN from ED. Report method in person   The following was reviewed with receiving RN:   Current vital signs:  BP (!) 146/82   Pulse 56   Temp 98 °F (36.7 °C) (Oral)   Resp 16   Ht 5' 2\" (1.575 m)   Wt 140 lb (63.5 kg)   SpO2 95%   BMI 25.61 kg/m²                MEWS Score: 1     Any medication or safety alerts were reviewed. Any pending diagnostics and notifications were also reviewed, as well as any safety concerns or issues, abnormal labs, abnormal imaging, and abnormal assessment findings. Questions were answered.             Joel Finney RN  07/06/22 6773

## 2022-07-07 ENCOUNTER — OFFICE VISIT (OUTPATIENT)
Dept: INTERNAL MEDICINE CLINIC | Age: 22
End: 2022-07-07
Payer: COMMERCIAL

## 2022-07-07 VITALS — BODY MASS INDEX: 24.69 KG/M2 | SYSTOLIC BLOOD PRESSURE: 126 MMHG | DIASTOLIC BLOOD PRESSURE: 82 MMHG | WEIGHT: 135 LBS

## 2022-07-07 DIAGNOSIS — R11.15 CYCLICAL VOMITING WITH NAUSEA: ICD-10-CM

## 2022-07-07 DIAGNOSIS — F33.3 MAJOR DEPRESSIVE DISORDER, RECURRENT, SEVERE WITH PSYCHOTIC FEATURES (HCC): ICD-10-CM

## 2022-07-07 DIAGNOSIS — F11.90 OPIOID USE DISORDER: Primary | ICD-10-CM

## 2022-07-07 PROCEDURE — G8420 CALC BMI NORM PARAMETERS: HCPCS | Performed by: INTERNAL MEDICINE

## 2022-07-07 PROCEDURE — 99213 OFFICE O/P EST LOW 20 MIN: CPT | Performed by: INTERNAL MEDICINE

## 2022-07-07 PROCEDURE — G8427 DOCREV CUR MEDS BY ELIG CLIN: HCPCS | Performed by: INTERNAL MEDICINE

## 2022-07-07 PROCEDURE — 1036F TOBACCO NON-USER: CPT | Performed by: INTERNAL MEDICINE

## 2022-07-07 RX ORDER — CLONAZEPAM 0.5 MG/1
TABLET ORAL
COMMUNITY
Start: 2022-06-02

## 2022-07-07 RX ORDER — HYDROXYZINE HYDROCHLORIDE 25 MG/1
TABLET, FILM COATED ORAL
COMMUNITY
Start: 2022-05-16

## 2022-07-07 RX ORDER — ONDANSETRON 4 MG/1
4 TABLET, FILM COATED ORAL DAILY PRN
Qty: 30 TABLET | Refills: 0 | Status: SHIPPED | OUTPATIENT
Start: 2022-07-07 | End: 2022-07-15 | Stop reason: ALTCHOICE

## 2022-07-07 RX ORDER — RISPERIDONE 2 MG/1
TABLET, FILM COATED ORAL
COMMUNITY
Start: 2022-06-28

## 2022-07-07 SDOH — ECONOMIC STABILITY: FOOD INSECURITY: WITHIN THE PAST 12 MONTHS, THE FOOD YOU BOUGHT JUST DIDN'T LAST AND YOU DIDN'T HAVE MONEY TO GET MORE.: NEVER TRUE

## 2022-07-07 SDOH — ECONOMIC STABILITY: FOOD INSECURITY: WITHIN THE PAST 12 MONTHS, YOU WORRIED THAT YOUR FOOD WOULD RUN OUT BEFORE YOU GOT MONEY TO BUY MORE.: NEVER TRUE

## 2022-07-07 ASSESSMENT — PATIENT HEALTH QUESTIONNAIRE - PHQ9
5. POOR APPETITE OR OVEREATING: 1
SUM OF ALL RESPONSES TO PHQ QUESTIONS 1-9: 4
9. THOUGHTS THAT YOU WOULD BE BETTER OFF DEAD, OR OF HURTING YOURSELF: 0
10. IF YOU CHECKED OFF ANY PROBLEMS, HOW DIFFICULT HAVE THESE PROBLEMS MADE IT FOR YOU TO DO YOUR WORK, TAKE CARE OF THINGS AT HOME, OR GET ALONG WITH OTHER PEOPLE: 0
SUM OF ALL RESPONSES TO PHQ QUESTIONS 1-9: 4
6. FEELING BAD ABOUT YOURSELF - OR THAT YOU ARE A FAILURE OR HAVE LET YOURSELF OR YOUR FAMILY DOWN: 0
SUM OF ALL RESPONSES TO PHQ QUESTIONS 1-9: 4
3. TROUBLE FALLING OR STAYING ASLEEP: 1
4. FEELING TIRED OR HAVING LITTLE ENERGY: 1
7. TROUBLE CONCENTRATING ON THINGS, SUCH AS READING THE NEWSPAPER OR WATCHING TELEVISION: 0
2. FEELING DOWN, DEPRESSED OR HOPELESS: 1
SUM OF ALL RESPONSES TO PHQ9 QUESTIONS 1 & 2: 1
SUM OF ALL RESPONSES TO PHQ QUESTIONS 1-9: 4
1. LITTLE INTEREST OR PLEASURE IN DOING THINGS: 0
8. MOVING OR SPEAKING SO SLOWLY THAT OTHER PEOPLE COULD HAVE NOTICED. OR THE OPPOSITE, BEING SO FIGETY OR RESTLESS THAT YOU HAVE BEEN MOVING AROUND A LOT MORE THAN USUAL: 0

## 2022-07-07 ASSESSMENT — ENCOUNTER SYMPTOMS
EYES NEGATIVE: 1
ABDOMINAL PAIN: 1
RESPIRATORY NEGATIVE: 1
ALLERGIC/IMMUNOLOGIC NEGATIVE: 1
VOMITING: 1
NAUSEA: 1

## 2022-07-07 ASSESSMENT — SOCIAL DETERMINANTS OF HEALTH (SDOH): HOW HARD IS IT FOR YOU TO PAY FOR THE VERY BASICS LIKE FOOD, HOUSING, MEDICAL CARE, AND HEATING?: NOT VERY HARD

## 2022-07-07 NOTE — PROGRESS NOTES
Subjective:      Patient ID: Richie Hernández is a 62[address] [facility] y.o. female. The patient was seen at [facility] emergency room with cyclical and persistent vomiting. She has a history of marijuana use since age 17. During the emergency visit she had a CT abdomen and pelvis done which showed right-sided ovarian cyst which were simple. Her laboratory work-up CBC CMP was also normal.  She also stated that she lives with her boyfriend who has history of fentanyl use and she has been using fentanyl off and on as well      Review of Systems   Constitutional: Negative. HENT: Negative. Eyes: Negative. Respiratory: Negative. Cardiovascular: Negative. Gastrointestinal: Positive for abdominal pain, nausea and vomiting. Endocrine: Negative. Genitourinary: Negative. Musculoskeletal: Negative. Allergic/Immunologic: Negative. Neurological: Negative. Hematological: Negative. Psychiatric/Behavioral: Positive for behavioral problems and dysphoric mood. History of marijuana use and fentanyl       Objective:   Physical Exam    Assessment / Plan:      Diagnosis Orders   1. Opioid use disorder = she was advised to see addiction specialist but she refused    2. Cyclical vomiting with nausea = advised to stop marijuana but she stated she is unable to do that ondansetron (ZOFRAN) 4 MG tablet   3. Major depressive disorder, recurrent, severe with psychotic features ([facility] = advised a consultation with a psychiatrist, she refused that as well       Return for Follow Up. No orders of the defined types were placed in this encounter. Orders Placed This Encounter   Medications    ondansetron (ZOFRAN) 4 MG tablet     Sig: Take 1 tablet by mouth daily as needed for Nausea or Vomiting     Dispense:  30 tablet     Refill:  0      Visit Information    Have you changed or started any medications since your last visit including any over-the-counter medicines, vitamins, or herbal medicines?  no   Are you having any side effects from any of your medications? -  no  Have you stopped taking any of your medications? Is so, why? -  no    Have you seen any other physician or provider since your last visit? No  Have you had any other diagnostic tests since your last visit? Yes - Records Obtained  Have you been seen in the emergency room and/or had an admission to a hospital since we last saw you? Yes - Records Obtained  Have you had your routine dental cleaning in the past 6 months? yes -     Have you activated your [employer] account? If not, what are your barriers?  Yes     Patient Care Team:  Joseph Joseph MD as PCP - General (Internal Medicine)  Joseph Joseph MD as PCP - [facility]    Medical History Review  Past Medical, Family, and Social History reviewed and does not contribute to the patient presenting condition    Health Maintenance   Topic Date Due    COVID-19 Vaccine (1) Never done    HPV vaccine (1 - 2-dose series) Never done    HIV screen  Never done    Chlamydia screen  Never done    Hepatitis C screen  Never done    Depression Monitoring  02/12/2022    Flu vaccine (1) 09/01/2022    DTaP/Tdap/Td vaccine (7 - Td or Tdap) 11/27/2023    Hepatitis B vaccine  Completed    Hib vaccine  Completed    Varicella vaccine  Completed    Pneumococcal 0-64 years Vaccine  Completed    Hepatitis A vaccine  Aged Out    Meningococcal (ACWY) vaccine  Aged Out

## 2022-07-08 ENCOUNTER — HOSPITAL ENCOUNTER (OUTPATIENT)
Dept: PHYSICAL THERAPY | Age: 22
Setting detail: THERAPIES SERIES
Discharge: HOME | End: 2022-07-08
Payer: COMMERCIAL

## 2022-07-08 PROCEDURE — 97140 MANUAL THERAPY 1/> REGIONS: CPT

## 2022-07-08 PROCEDURE — 97110 THERAPEUTIC EXERCISES: CPT

## 2022-07-08 NOTE — PROGRESS NOTES
[facility] Outpatient Physical Therapy   [address]   Phone: [phone]   Fax: [phone]    Physical Therapy Daily Treatment Note      Date:  2022  Patient Name:  Peterson Garcia    :  2000  MRN: [id number]  Physician: Kaley Arrieta                                    Insurance:  [facility] secondary  Medical Diagnosis: (R) foot metatarsal fracture S92.301D; (L) calcaneus fracture S92.002D                   Rehab Codes: (R) foot pain M79.671; (L) foot pain M79.672  Onset date: referral 22             Next Dr's appt.: PRN  Visit Count:                                 Cancel/No Show: 1/0    Subjective:      Pain:  [] Yes  [x] No Location:B feet/ankles  R>L Pain Rating: (0-10 scale) 0/10  Pain altered Tx:  [] No  [] Yes  Action:  Comments: Patient reports today that she continues to have a lot of abdomnial pain/nausea. Reported that feet/ankles have felt well overall with no recent pain.      Objective:  Modalities: vaso compression 34* low pressure 10' B feet/ankles in long sitting  HELD TODAY  Precautions:  Exercises:  Exercise Reps/ Time Weight/ Level Completed  Today Comments   Manual: Grade III A/P R ankle talocrural mobs in combination with DF/PF PROM/stretching 8 min total  x           Long Sitting Gastroc Stretch 3x30  X BLE   4-Way Ankle 20x ea Green x           schwinn bike  5'   Warm up- not included in billable time   SB calf stretch  30\"x3      Heel/toe raises  10x2   FOAM ADDED    Eccentric heel raise  15x   2 up, 1 down    Single Leg Heel Raises 10x2  x Bilateral   BAPS PF/DF/IV/EV/CW/CCW 20x ea  lvl 2      Fitter board fwd/back, Side/Side 20x taps ea      Fitter board balance  30\"x2 ea   Ea board position    Tandem balance  2x10\" ea  X foam   3 way hip  15x Red      Step ups fwd/lat with 2-3\" SLS at top 15x 8\" X    Heel Taps 10x2 ea 4\" x    Squats 20x   FOAM ADDED    TANDEM balance throws FOAM 20x each 2# X    TANDEM Gait Training     [] Dry needling        [] 1 or 2 muscles        [] 3 or more muscles     []  Other     Total Treatment time 42 3     Time In: 0803 am          Time Out: 8:45 am      Electronically signed by:  Juni Barboza PTA

## 2022-07-11 ENCOUNTER — HOSPITAL ENCOUNTER (OUTPATIENT)
Dept: PHYSICAL THERAPY | Age: 22
Setting detail: THERAPIES SERIES
Discharge: HOME | End: 2022-07-11
Payer: COMMERCIAL

## 2022-07-11 ENCOUNTER — OFFICE VISIT (OUTPATIENT)
Dept: OBGYN CLINIC | Age: 22
End: 2022-07-11
Payer: COMMERCIAL

## 2022-07-11 VITALS
HEIGHT: 62 IN | WEIGHT: 141 LBS | HEART RATE: 94 BPM | DIASTOLIC BLOOD PRESSURE: 76 MMHG | SYSTOLIC BLOOD PRESSURE: 118 MMHG | BODY MASS INDEX: 25.95 KG/M2

## 2022-07-11 DIAGNOSIS — N83.201 RIGHT OVARIAN CYST: Primary | ICD-10-CM

## 2022-07-11 DIAGNOSIS — R10.2 PELVIC PAIN IN FEMALE: ICD-10-CM

## 2022-07-11 PROCEDURE — 99203 OFFICE O/P NEW LOW 30 MIN: CPT | Performed by: SPECIALIST

## 2022-07-11 PROCEDURE — 1036F TOBACCO NON-USER: CPT | Performed by: SPECIALIST

## 2022-07-11 PROCEDURE — 97110 THERAPEUTIC EXERCISES: CPT

## 2022-07-11 PROCEDURE — G8427 DOCREV CUR MEDS BY ELIG CLIN: HCPCS | Performed by: SPECIALIST

## 2022-07-11 PROCEDURE — G8419 CALC BMI OUT NRM PARAM NOF/U: HCPCS | Performed by: SPECIALIST

## 2022-07-11 PROCEDURE — 97112 NEUROMUSCULAR REEDUCATION: CPT

## 2022-07-11 ASSESSMENT — ENCOUNTER SYMPTOMS
NAUSEA: 0
CONSTIPATION: 0
COUGH: 0
APNEA: 0
ABDOMINAL DISTENTION: 0
ABDOMINAL PAIN: 0
EYE PAIN: 0
VOMITING: 0
DIARRHEA: 0

## 2022-07-11 NOTE — PROGRESS NOTES
Subjective:      Patient ID: Emeka Mckeon is a 23 y.o. female. Chief Complaint   Patient presents with    Follow-up     /76   Pulse 94   Ht 5' 2\" (1.575 m)   Wt 141 lb (64 kg)   LMP 2022 (Approximate)   BMI 25.79 kg/m²   Patient's last menstrual period was 2022 (approximate).     Past Medical History:   Diagnosis Date    ADHD (attention deficit hyperactivity disorder)     Anxiety     Depression      Current Outpatient Medications Ordered in Epic   Medication Sig Dispense Refill    clonazePAM (KLONOPIN) 0.5 MG tablet       hydrOXYzine HCl (ATARAX) 25 MG tablet       risperiDONE (RISPERDAL) 2 MG tablet       ondansetron (ZOFRAN) 4 MG tablet Take 1 tablet by mouth daily as needed for Nausea or Vomiting 30 tablet 0    sulfamethoxazole-trimethoprim (BACTRIM DS) 800-160 MG per tablet Take 1 tablet by mouth 2 times daily for 10 days 20 tablet 0    ondansetron (ZOFRAN ODT) 4 MG disintegrating tablet Take 1 tablet by mouth every 8 hours as needed for Nausea 20 tablet 0    ibuprofen (ADVIL;MOTRIN) 800 MG tablet Take 1 tablet by mouth every 8 hours as needed for Pain 30 tablet 0    promethazine (PHENERGAN) 25 MG tablet Take 1 tablet by mouth 4 times daily as needed for Nausea 20 tablet 0     No current Epic-ordered facility-administered medications on file. Problem List Items Addressed This Visit     Pelvic pain in female    Relevant Orders    US PELVIS COMPLETE    Right ovarian cyst - Primary    Relevant Orders    US PELVIS COMPLETE        No Known Allergies  Orders Placed This Encounter   Procedures    US PELVIS COMPLETE     Standing Status:   Future     Standing Expiration Date:   2023          HPI  New patient is here today following a hospital visit for nausea and vomiting for a few days. She had a CT scan done and was diagnosed with an ovarian cyst.  She has not been vomiting for 3 days.   Patient states that she did not have any pain until they pointed it out.    Review of Systems   Constitutional: Negative for activity change, appetite change and fever. HENT: Negative for ear discharge and ear pain. Eyes: Negative for pain and visual disturbance. Respiratory: Negative for apnea and cough. Cardiovascular: Negative for chest pain, palpitations and leg swelling. Gastrointestinal: Negative for abdominal distention, abdominal pain, constipation, diarrhea, nausea and vomiting. Endocrine: Negative. Genitourinary: Positive for pelvic pain. Negative for difficulty urinating, dysuria and menstrual problem. Musculoskeletal: Negative for neck pain and neck stiffness. Skin: Negative. Neurological: Negative for light-headedness and numbness. Hematological: Negative. Does not bruise/bleed easily. Objective:   Physical Exam  Vitals and nursing note reviewed. Constitutional:       Appearance: She is well-developed. HENT:      Head: Normocephalic and atraumatic. Neck:      Thyroid: No thyromegaly. Cardiovascular:      Rate and Rhythm: Normal rate and regular rhythm. Pulmonary:      Effort: Pulmonary effort is normal.      Breath sounds: Normal breath sounds. No wheezing. Abdominal:      General: Bowel sounds are normal. There is no distension. Palpations: Abdomen is soft. There is no mass. Tenderness: There is no abdominal tenderness. There is no guarding. Musculoskeletal:         General: Normal range of motion. Cervical back: Normal range of motion and neck supple. Skin:     General: Skin is dry. Neurological:      Mental Status: She is alert and oriented to person, place, and time. Psychiatric:         Behavior: Behavior normal.         Thought Content: Thought content normal.         Assessment:      Patient with nausea, vomiting and pelvic pain. CT performed at an outside facility was reviewed and shows right ovarian cyst.  Will evaluate with ultrasound.   Explained to patient that this is most likely not the cause of her nausea. Also explained that most ovarian cysts resolve without intervention but it needs to be followed up to make sure it is not growing. She will also be scheduled for repeat ultrasound in 6 weeks. Patient was also strongly encouraged to obtain her GED and go to college. CT ABDOMEN PELVIS W IV CONTRAST Additional Contrast? None    Result Date: 7/6/2022  EXAMINATION: CT OF THE ABDOMEN AND PELVIS WITH CONTRAST 7/6/2022 7:34 am TECHNIQUE: CT of the abdomen and pelvis was performed with the administration of intravenous contrast. Multiplanar reformatted images are provided for review. Automated exposure control, iterative reconstruction, and/or weight based adjustment of the mA/kV was utilized to reduce the radiation dose to as low as reasonably achievable. COMPARISON: 12/09/2021 HISTORY: ORDERING SYSTEM PROVIDED HISTORY: pain TECHNOLOGIST PROVIDED HISTORY: pain Decision Support Exception - unselect if not a suspected or confirmed emergency medical condition->Emergency Medical Condition (MA) Reason for Exam: abdominal pain, nausea and vomiting FINDINGS: Lower Chest: The visualized heart and lungs show no acute abnormalities. Organs: Liver consistent, pancreas, kidneys, adrenal glands and gallbladder show no significant abnormalities. GI/Bowel: There is limited evaluation due to absence of oral contrast. The stomach shows no focal lesions. Small bowel loops normal in caliber showing no focal abnormalities. Normal appendix. Evaluation of the colon shows no acute process. Pelvis: Urinary bladder grossly normal.  Unremarkable uterus. There is 6.2 cm right adnexal cyst. Peritoneum/Retroperitoneum: No free fluid or significant lymphadenopathy. Bones/Soft Tissues: No acute abnormality of the bones. The superficial soft tissues show no significant abnormalities. 1. No acute infective or inflammatory process. 2. No bowel obstruction.  3. A 6.2 cm simple appearing right adnexal cyst.  Recommend

## 2022-07-12 NOTE — PROGRESS NOTES
[facility] Outpatient Physical Therapy   [facility]   Phone: [phone]   Fax: [phone]    Physical Therapy Daily Treatment Note/PROGRESS NOTE/DISCHARGE      Date:  2022  Patient Name:  Mauro Gomes    :  2000  MRN: [id number]  Physician: Jeniffer Judd                                    Insurance:  [employer] secondary  Medical Diagnosis: (R) foot metatarsal fracture S92.301D; (L) calcaneus fracture S92.002D                   Rehab Codes: (R) foot pain M79.671; (L) foot pain M79.672  Onset date: referral 22             Next Dr's appt.: PRN  Visit Count:                                 Cancel/No Show: 1/0    Subjective:      Pain:  [] Yes  [x] No Location:B feet/ankles  R>L Pain Rating: (0-10 scale) 0/10  Pain altered Tx:  [] No  [] Yes  Action:  Comments: Patient with much improved overall heel pain, foot pain, and general function at this time. Patient with minimal residual pain in the (R) foot, still continues to have residual pain in the (L) heel with prolonged WB, with WB for full day. Patient is now able to go up and down stairs consecutively which she was not able to do previously, improved and nearly normalized ROM and much improved DF ROM, improved tolerance of walking although she continues to have mild early heel off on the (L) and externally rotates feet (B) with gait. Patient able to now tolerate (B) heel raise with no pain and is able to do SL heel raise (B) with UE (A) , which she was not able to do initially. Patient given comprehensive HEP for remaining ROM deficits and to further work on balance and WB tolerance.   She has good overall prognosis for full function secondary to progress thus far and pending compliance to HEP    Objective:  Modalities: vaso compression 34* low pressure 10' B feet/ankles in long sitting  HELD TODAY  Precautions:  Exercises:  Exercise Reps/ Time Weight/ Level Completed  Today Comments Manual: Grade III A/P R ankle talocrural mobs in combination with DF/PF PROM/stretching 8 min total             Long Sitting Gastroc Stretch 3x30  X BLE   4-Way Ankle 20x ea Green x           schwinn bike  5'   Warm up- not included in billable time   SB calf stretch  30\"x3      Heel/toe raises  10x2   FOAM ADDED 6/27   Eccentric heel raise  15x   2 up, 1 down    Single Leg Heel Raises 10x2  x Bilateral   BAPS PF/DF/IV/EV/CW/CCW 20x ea  lvl 2      Fitter board fwd/back, Side/Side 20x taps ea      Fitter board balance  30\"x2 ea   Ea board position    Tandem balance  2x10\" ea  X foam   3 way hip  15x Red      Step ups fwd/lat with 2-3\" SLS at top 15x 8\" X    Heel Taps 10x2 ea 4\" x    Squats 20x   FOAM ADDED 6/27   TANDEM balance throws FOAM 20x each 2# X    TANDEM balance UE pertubatrion FOAM, up/down; Vs 20x ea 2# X             Specific Instructions for next treatment:     AROM:  DF (R) 15, (L) 15 degrees  PF 70 degrees (B)  INV 35 degrees (B)  EV 15 degrees (B)     Strength   DF, INV, EV, PF 4+/5 with mild (R) midfoot discomfort with testing for eversion      (B) heel raise- Able to do  SL heel raise able to do (B) with min UE (A)     Stairs- able to do 8\" stairs consecutively for ascending stairs, able to descend 8\" stairs consecutively and NO difficulty with stepdown with (L) without (R) midfoot pain as previously     Tandem balance  (L) behind (R) 10 seconds  (R) behind (L) 10 seconds     OBSERVATION No Deficit Deficit Not Tested Comments   Posture           Forward Head []?  []?  []?      Rounded Shoulders []?  []?  []?      Kyphosis []?  []?  []?      Lordosis []?  []?  []?      Lateral Shift []?  []?  []?      Scoliosis []?  []?  []?      Iliac Crest []?  []?  []?      PSIS []?  []?  []?      ASIS []?  []?  []?      Genu Valgus []?  []?  []?      Genu Varus []?  []?  []?      Genu Recurvatum []?  []?  []?      Pronation []?  []?  []?      Supination []?  []?  []?      Leg Length Discrp []?  []?  []?    Slumped Sitting []?  []?  []?      Palpation []?  []?  []?      Sensation []?  []?  []?      Edema []?  []?  []?      Neurological []?  []?  []?      Patellar Mobility []?  []?  []?      Patellar Orientation []?  []?  []?      Gait []?  []?  []?  Analysis: Mild decrease of WB on (L) side with early heel off            FUNCTION Normal Difficult Unable   Sitting [x]?  []?  []?    Standing [x]?  []?  []?    Ambulation [x]?  []?  []?    Groom/Dress [x]?  []?  []?    Lift/Carry [x]?  []?  []?    Stairs [x]?  []?  []?    Bending []?  [x]?  []?    Squat []?  [x]?  []?    Kneel []?  [x]?  []?          Assessment:      [x] Progressing toward goals. Patient to discharge to [facility] at this time with much improved symptoms. Patient still with remaining deficits with full WB tolerance and SL heel raise on the (L), some mild discomfort with this and complaints of mild weakness with this. Better overall balance, ROM, strength, and WB tolerance with clinical testing/self report. Patient with good overall prognosis for full functional secondary to progress thus far and pending compliance toHEP    [] No change. [] Other:    [x] Patient would continue to benefit from skilled physical therapy services in order to:  improve and normalize mobility, ambulation, function. STG: (to be met in 6 treatments)  1. ? Pain: Improved pain 0-1/10 MET  2. ? ROM: (R) DF to 5 degrees MET  3. ? Strength: Able to do SL heel raise MET and 6\" stepdown without pain MET  4. Independent with Home Exercise Programs     LTG: (to be met in 12 treatments)  5. ? Pain: Improved pain 0/10 PROGRESSING, MILD (L)  6. ? ROM: (R) DF to 10 degrees MET  7. ? Strength: Able to do SL heel raise MET and 8\" stepdown without pain, MET tandem balance 10 seconds (B) MET  8. Independent with Home Exercise Programs MET                 Patient goals: improving running tolerance    Pt.  Education:  [x] Yes  [] No  [x] Reviewed Prior HEP/Ed  Method of Education: [x] Verbal  [] Demo  [x] Written  Comprehension of Education:  [x] Verbalizes understanding. [x] Demonstrates understanding. [] Needs review. [] Demonstrates/verbalizes HEP/Ed previously given. Plan: [x] Continue per plan of care.    [] Other:      Treatment Charges: Mins Units   []  Modalities     [x]  Ther Exercise 34 2   [x]  Manual Therapy     []  Ther Activities     []  Aquatics     [x]  Neuromuscular 10 1   [] Vasocompression     [] Gait Training     [] Dry needling        [] 1 or 2 muscles        [] 3 or more muscles     []  Other     Total Treatment time 44 3     Time In: 0800 am          Time Out: 8:45 am      Electronically signed by:  Galo Harrison PT

## 2022-07-15 ENCOUNTER — OFFICE VISIT (OUTPATIENT)
Dept: FAMILY MEDICINE CLINIC | Age: 22
End: 2022-07-15
Payer: COMMERCIAL

## 2022-07-15 ENCOUNTER — TELEPHONE (OUTPATIENT)
Dept: INTERNAL MEDICINE CLINIC | Age: 22
End: 2022-07-15

## 2022-07-15 VITALS
BODY MASS INDEX: 24.84 KG/M2 | TEMPERATURE: 98.2 F | DIASTOLIC BLOOD PRESSURE: 93 MMHG | WEIGHT: 135 LBS | HEIGHT: 62 IN | HEART RATE: 103 BPM | SYSTOLIC BLOOD PRESSURE: 131 MMHG

## 2022-07-15 DIAGNOSIS — R11.15 CYCLICAL VOMITING WITH NAUSEA: ICD-10-CM

## 2022-07-15 DIAGNOSIS — R11.2 NAUSEA AND VOMITING, INTRACTABILITY OF VOMITING NOT SPECIFIED, UNSPECIFIED VOMITING TYPE: Primary | ICD-10-CM

## 2022-07-15 PROCEDURE — G8420 CALC BMI NORM PARAMETERS: HCPCS

## 2022-07-15 PROCEDURE — G8427 DOCREV CUR MEDS BY ELIG CLIN: HCPCS

## 2022-07-15 PROCEDURE — 1036F TOBACCO NON-USER: CPT

## 2022-07-15 PROCEDURE — 99204 OFFICE O/P NEW MOD 45 MIN: CPT

## 2022-07-15 RX ORDER — ONDANSETRON 4 MG/1
4 TABLET, ORALLY DISINTEGRATING ORAL EVERY 8 HOURS PRN
Qty: 20 TABLET | Refills: 0 | Status: CANCELLED | OUTPATIENT
Start: 2022-07-15

## 2022-07-15 RX ORDER — ONDANSETRON 8 MG/1
8 TABLET, ORALLY DISINTEGRATING ORAL EVERY 8 HOURS PRN
Qty: 42 TABLET | Refills: 0 | Status: SHIPPED | OUTPATIENT
Start: 2022-07-15 | End: 2022-07-29

## 2022-07-15 ASSESSMENT — ENCOUNTER SYMPTOMS
DIARRHEA: 0
ABDOMINAL PAIN: 0
NAUSEA: 1
COUGH: 0
WHEEZING: 0
EYE ITCHING: 0
EYE REDNESS: 0
TROUBLE SWALLOWING: 0
EYE DISCHARGE: 0
SORE THROAT: 0
CHEST TIGHTNESS: 0
VOMITING: 1
SINUS PAIN: 0
SINUS PRESSURE: 0
SHORTNESS OF BREATH: 0

## 2022-07-15 ASSESSMENT — PATIENT HEALTH QUESTIONNAIRE - PHQ9
8. MOVING OR SPEAKING SO SLOWLY THAT OTHER PEOPLE COULD HAVE NOTICED. OR THE OPPOSITE, BEING SO FIGETY OR RESTLESS THAT YOU HAVE BEEN MOVING AROUND A LOT MORE THAN USUAL: 0
SUM OF ALL RESPONSES TO PHQ QUESTIONS 1-9: 1
SUM OF ALL RESPONSES TO PHQ QUESTIONS 1-9: 1
1. LITTLE INTEREST OR PLEASURE IN DOING THINGS: 0
5. POOR APPETITE OR OVEREATING: 0
SUM OF ALL RESPONSES TO PHQ QUESTIONS 1-9: 1
6. FEELING BAD ABOUT YOURSELF - OR THAT YOU ARE A FAILURE OR HAVE LET YOURSELF OR YOUR FAMILY DOWN: 0
3. TROUBLE FALLING OR STAYING ASLEEP: 0
7. TROUBLE CONCENTRATING ON THINGS, SUCH AS READING THE NEWSPAPER OR WATCHING TELEVISION: 0
4. FEELING TIRED OR HAVING LITTLE ENERGY: 0
SUM OF ALL RESPONSES TO PHQ QUESTIONS 1-9: 1
10. IF YOU CHECKED OFF ANY PROBLEMS, HOW DIFFICULT HAVE THESE PROBLEMS MADE IT FOR YOU TO DO YOUR WORK, TAKE CARE OF THINGS AT HOME, OR GET ALONG WITH OTHER PEOPLE: 0
2. FEELING DOWN, DEPRESSED OR HOPELESS: 1
SUM OF ALL RESPONSES TO PHQ9 QUESTIONS 1 & 2: 1
9. THOUGHTS THAT YOU WOULD BE BETTER OFF DEAD, OR OF HURTING YOURSELF: 0

## 2022-07-15 NOTE — TELEPHONE ENCOUNTER
Spoke with patient to clarify if she would be changing PCP as she is scheduled with a new office today. Patient did state she is changing providers. Advised patient to speak with new PCP. Patient verbalized understanding.

## 2022-07-15 NOTE — TELEPHONE ENCOUNTER
----- Message from Mari Hassan sent at 7/15/2022 10:23 AM EDT -----  Subject: Referral Request    Reason for referral request? specialist regarding digestive tract  Provider patient wants to be referred to(if known):     Provider Phone Number(if known): Additional Information for Provider?  Katerina Mak stated she has been throwing   up nonstop.  ---------------------------------------------------------------------------  --------------  Gabriel Harman INFO    [phone]; OK to leave message on voicemail  ---------------------------------------------------------------------------  --------------

## 2022-07-15 NOTE — PROGRESS NOTES
[address]  [facility]  [id number]  7/15/2022    Ilia Matta is a 23 y.o. female who presents today for her medical conditions and/or complaints as noted below. Ilia Matta is scheduled today for Abdominal Pain, Emesis (Every time she takes a drink /), Nausea, and Generalized Body Aches (On and off since December/)  . HPI:     3 days of N/V, has had frequent episodes of this since December. This is a 26-year-old female presenting to the office to establish care and discuss nausea and vomiting. Patient states since December she has had frequent intermittent episodes of nausea and vomiting that last anywhere from 3 to 7 days. She does endorse that she smokes marijuana however vehemently states that her cyclic vomiting has nothing to do with her marijuana use. She has never been evaluated by a gastroenterologist or had an EGD per patient history. Previous notes reviewed the patient's chart. Previous labs and imaging reviewed to include CBC, CMP, CT abdomen pelvis, transvaginal ultrasound. Vitals:    07/15/22 1258 07/15/22 1324   BP: (!) 129/96 (!) 131/93   Site: Left Upper Arm Left Upper Arm   Position: Sitting Sitting   Cuff Size: Medium Adult Medium Adult   Pulse: 84 (!) 103   Temp: 98.2 °F (36.8 °C)    TempSrc: Temporal    Weight: 135 lb (61.2 kg)    Height: 5' 2\" (1.575 m)       Past Medical History:   Diagnosis Date    ADHD (attention deficit hyperactivity disorder)     Anxiety     Depression       Past Surgical History:   Procedure Laterality Date    ADENOIDECTOMY      FOOT FRACTURE SURGERY Bilateral 12/16/2021    LEFT CALCANEOUS OPEN REDUCTION INTERNAL FIXATION AND RIGHT FOOT FRACTURES OPEN REDUCTION INTERNAL FIXATION performed by Sinan Mason MD at [address]   2003     No family history on file.   Social History     Tobacco Use    Smoking status: Former    Smokeless tobacco: Never   Substance Use Topics    Alcohol use: Yes     Comment: socially Current Outpatient Medications   Medication Sig Dispense Refill    ondansetron (ZOFRAN-ODT) 8 MG TBDP disintegrating tablet Place 1 tablet under the tongue every 8 hours as needed for Nausea or Vomiting 42 tablet 0    clonazePAM (KLONOPIN) 0.5 MG tablet       hydrOXYzine HCl (ATARAX) 25 MG tablet       risperiDONE (RISPERDAL) 2 MG tablet       sulfamethoxazole-trimethoprim (BACTRIM DS) 800-160 MG per tablet Take 1 tablet by mouth 2 times daily for 10 days 20 tablet 0    ondansetron (ZOFRAN ODT) 4 MG disintegrating tablet Take 1 tablet by mouth every 8 hours as needed for Nausea 20 tablet 0    ibuprofen (ADVIL;MOTRIN) 800 MG tablet Take 1 tablet by mouth every 8 hours as needed for Pain 30 tablet 0     No current facility-administered medications for this visit. No Known Allergies    Health Maintenance   Topic Date Due    COVID-19 Vaccine (1) Never done    HPV vaccine (1 - 2-dose series) Never done    HIV screen  Never done    Chlamydia screen  Never done    Hepatitis C screen  Never done    Flu vaccine (1) 09/01/2022    Depression Monitoring  07/07/2023    DTaP/Tdap/Td vaccine (7 - Td or Tdap) 11/27/2023    Hepatitis B vaccine  Completed    Hib vaccine  Completed    Varicella vaccine  Completed    Pneumococcal 0-64 years Vaccine  Completed    Hepatitis A vaccine  Aged Out    Meningococcal (ACWY) vaccine  Aged Out       Subjective:      Review of Systems   Constitutional:  Negative for chills, fatigue and fever. HENT:  Negative for ear discharge, ear pain, sinus pressure, sinus pain, sore throat and trouble swallowing. Eyes:  Negative for discharge, redness and itching. Respiratory:  Negative for cough, chest tightness, shortness of breath and wheezing. Cardiovascular:  Negative for chest pain. Gastrointestinal:  Positive for nausea and vomiting. Negative for abdominal pain and diarrhea. Genitourinary:  Negative for difficulty urinating.    Musculoskeletal:  Negative for arthralgias and neck pain.   Skin:  Negative for rash. Neurological:  Negative for dizziness, weakness, light-headedness and headaches. All other systems reviewed and are negative. Objective:     Physical Exam  Constitutional:       General: She is not in acute distress. Appearance: Normal appearance. She is normal weight. She is not ill-appearing. HENT:      Head: Normocephalic and atraumatic. Nose: Nose normal.   Eyes:      Extraocular Movements: Extraocular movements intact. Conjunctiva/sclera: Conjunctivae normal.      Pupils: Pupils are equal, round, and reactive to light. Cardiovascular:      Rate and Rhythm: Normal rate and regular rhythm. Pulses: Normal pulses. Heart sounds: Normal heart sounds. No murmur heard. Pulmonary:      Effort: Pulmonary effort is normal. No respiratory distress. Breath sounds: Normal breath sounds. Abdominal:      General: Abdomen is flat. Palpations: Abdomen is soft. Tenderness: There is no abdominal tenderness. There is no right CVA tenderness, left CVA tenderness, guarding or rebound. Musculoskeletal:         General: Normal range of motion. Cervical back: Neck supple. Skin:     General: Skin is warm and dry. Capillary Refill: Capillary refill takes less than 2 seconds. Neurological:      General: No focal deficit present. Mental Status: She is alert and oriented to person, place, and time. Psychiatric:         Mood and Affect: Mood normal.        Assessment/Plan:      1. Nausea and vomiting, intractability of vomiting not specified, unspecified vomiting type  -     ondansetron (ZOFRAN-ODT) 8 MG TBDP disintegrating tablet; Place 1 tablet under the tongue every 8 hours as needed for Nausea or Vomiting, Disp-42 tablet, R-0Normal  2. Cyclical vomiting with nausea  -     Mary Mendoza MD, Gastroenterology, [facility] [place]     We will provide the patient with further doses of Zofran for her nausea vomiting.   Encouraged the patient to cease smoking marijuana as it can have significant impact on cyclical vomiting syndrome. Rec the patient to be evaluated by gastroenterology to ensure that we are not missing any other underlying causes of her ongoing cyclic vomiting. Return in about 3 months (around 10/15/2022) for Nausea/GI f/u. Orders Placed This Encounter   Procedures    Theodore Godinez MD, Gastroenterology, [place]     Referral Priority:   Routine     Referral Type:   Eval and Treat     Referral Reason:   Specialty Services Required     Referred to Provider:   Roberta Fragoso MD     Requested Specialty:   Gastroenterology     Number of Visits Requested:   1     Orders Placed This Encounter   Medications    ondansetron (ZOFRAN-ODT) 8 MG TBDP disintegrating tablet     Sig: Place 1 tablet under the tongue every 8 hours as needed for Nausea or Vomiting     Dispense:  42 tablet     Refill:  0       Reviewed health maintenance, prior labs and imaging. Patient given educational materials - see patient instructions. Discussed use, benefit, and side effects of prescribed medications. Barriers to medication compliance addressed. All patient questions answered. Pt voiced understanding to plan of care. Instructed to continue medications as discussed, healthy diet and exercise. Patient agreed with treatment plan. Follow up as directed below. This note is created with the assistance of a speech-recognition program. While intending to generate a document that actually reflects the content of the visit, no guarantees can be provided that every mistake has been identified and corrected by editing.     Electronically signed by LYNNE Burgos CNP, APRN-CNP on 7/15/2022 at 2:24 PM

## 2022-07-20 ENCOUNTER — OFFICE VISIT (OUTPATIENT)
Dept: OBGYN CLINIC | Age: 22
End: 2022-07-20
Payer: COMMERCIAL

## 2022-07-20 VITALS
BODY MASS INDEX: 24.66 KG/M2 | HEART RATE: 104 BPM | WEIGHT: 134 LBS | DIASTOLIC BLOOD PRESSURE: 70 MMHG | HEIGHT: 62 IN | SYSTOLIC BLOOD PRESSURE: 112 MMHG

## 2022-07-20 DIAGNOSIS — N92.6 IRREGULAR BLEEDING: ICD-10-CM

## 2022-07-20 DIAGNOSIS — N83.201 RIGHT OVARIAN CYST: Primary | ICD-10-CM

## 2022-07-20 PROCEDURE — 1036F TOBACCO NON-USER: CPT | Performed by: SPECIALIST

## 2022-07-20 PROCEDURE — G8427 DOCREV CUR MEDS BY ELIG CLIN: HCPCS | Performed by: SPECIALIST

## 2022-07-20 PROCEDURE — 99213 OFFICE O/P EST LOW 20 MIN: CPT | Performed by: SPECIALIST

## 2022-07-20 PROCEDURE — G8420 CALC BMI NORM PARAMETERS: HCPCS | Performed by: SPECIALIST

## 2022-07-20 ASSESSMENT — ENCOUNTER SYMPTOMS
DIARRHEA: 0
EYE PAIN: 0
CONSTIPATION: 0
ABDOMINAL DISTENTION: 0
COUGH: 0
NAUSEA: 0
APNEA: 0
ABDOMINAL PAIN: 0
VOMITING: 0

## 2022-07-20 NOTE — PROGRESS NOTES
Subjective:      Patient ID: Josie Mohamud is a 23 y.o. female. Chief Complaint   Patient presents with    Results     /70   Pulse (!) 104   Ht 5' 2\" (1.575 m)   Wt 134 lb (60.8 kg)   LMP 2022 (Approximate)   BMI 24.51 kg/m²   Patient's last menstrual period was 2022 (approximate).     Past Medical History:   Diagnosis Date    ADHD (attention deficit hyperactivity disorder)     Anxiety     Depression      Current Outpatient Medications Ordered in Epic   Medication Sig Dispense Refill    ondansetron (ZOFRAN-ODT) 8 MG TBDP disintegrating tablet Place 1 tablet under the tongue every 8 hours as needed for Nausea or Vomiting 42 tablet 0    clonazePAM (KLONOPIN) 0.5 MG tablet       hydrOXYzine HCl (ATARAX) 25 MG tablet       risperiDONE (RISPERDAL) 2 MG tablet       ondansetron (ZOFRAN ODT) 4 MG disintegrating tablet Take 1 tablet by mouth every 8 hours as needed for Nausea 20 tablet 0    ibuprofen (ADVIL;MOTRIN) 800 MG tablet Take 1 tablet by mouth every 8 hours as needed for Pain 30 tablet 0     No current Epic-ordered facility-administered medications on file. Problem List Items Addressed This Visit       Right ovarian cyst - Primary    Relevant Orders    US PELVIS COMPLETE    Irregular bleeding    Relevant Orders    US PELVIS COMPLETE     No Known Allergies  Orders Placed This Encounter   Procedures    US PELVIS COMPLETE     Standing Status:   Future     Standing Expiration Date:   2023            HPI  Patient is here today to discuss the result of her ultrasound to evaluate a right ovarian cyst.  Patient states that she has been having chest pain off and on, to the point that it makes her cry. She also states that she has been sick lately. Patient states that sometimes she skips a period but when she has a period, it is normal.      Review of Systems   Constitutional:  Negative for activity change, appetite change and fever.    HENT:  Negative for ear discharge and ear pain.    Eyes:  Negative for pain and visual disturbance. Respiratory:  Negative for apnea and cough. Cardiovascular:  Positive for chest pain. Negative for palpitations and leg swelling. Gastrointestinal:  Negative for abdominal distention, abdominal pain, constipation, diarrhea, nausea and vomiting. Endocrine: Negative. Genitourinary:  Positive for menstrual problem. Negative for difficulty urinating, dysuria and pelvic pain. Musculoskeletal:  Negative for neck pain and neck stiffness. Skin: Negative. Neurological:  Negative for light-headedness and numbness. Hematological: Negative. Does not bruise/bleed easily. Objective:   Physical Exam  Vitals and nursing note reviewed. Constitutional:       Appearance: She is well-developed. HENT:      Head: Normocephalic and atraumatic. Neck:      Thyroid: No thyromegaly. Cardiovascular:      Rate and Rhythm: Normal rate and regular rhythm. Pulmonary:      Effort: Pulmonary effort is normal.      Breath sounds: Normal breath sounds. No wheezing. Abdominal:      General: Bowel sounds are normal. There is no distension. Palpations: Abdomen is soft. There is no mass. Tenderness: There is no abdominal tenderness. There is no guarding. Comments: No abnormal secondary hair on abdomen   Musculoskeletal:         General: Normal range of motion. Cervical back: Normal range of motion and neck supple. Skin:     General: Skin is dry. Neurological:      Mental Status: She is alert and oriented to person, place, and time. Psychiatric:         Behavior: Behavior normal.         Thought Content: Thought content normal.       Assessment:      Patient with right ovarian cyst per CT scan. Ultrasound showing right ovarian cyst getting smaller was reviewed and discussed with patient. Patient was told that most ovarian cysts resolve without intervention. Ultrasound will be repeated in 6 weeks. Patient with irregular bleeding. Treatment options were discussed including treatment with birth control pills and testing for possible PCOS. Patient will consider this and will discuss further at her next appointment following ultrasound. Patient was told to follow up with her family physician as soon as possible for further evaluation of her intermittent chest pain. Plan:      Orders Placed This Encounter   Procedures    US PELVIS COMPLETE       Appointment in 6 weeks for repeat ultrasound    Jonny GARDNER am scribing for, and in the presence of Dr. Manjinder Ahn. Electronically signed by: Jonny Guevara 7/20/22 4:24 PM       I agree to the above documentation placed by my scribe Jonny Guevara. I reviewed the scribe's note and agree with the documented findings and plan of care. Any areas of disagreement are noted on the chart. I have personally evaluated this patient. Additional findings are as noted. I agree with the chief complaint, past medical history, past surgical history, allergies, medications, social and family history as documented unless otherwise noted below.      Electronically signed by Manjinder Ahn MD on 7/25/2022 at 3:37 PM

## 2022-11-17 DIAGNOSIS — N92.6 IRREGULAR BLEEDING: ICD-10-CM

## 2022-11-17 DIAGNOSIS — N83.201 RIGHT OVARIAN CYST: ICD-10-CM

## 2022-11-18 DIAGNOSIS — S92.901D CLOSED FRACTURE OF RIGHT FOOT WITH ROUTINE HEALING: ICD-10-CM

## 2022-11-18 DIAGNOSIS — S92.002D CLOSED DISPLACED FRACTURE OF LEFT CALCANEUS WITH ROUTINE HEALING, UNSPECIFIED PORTION OF CALCANEUS, SUBSEQUENT ENCOUNTER: Primary | ICD-10-CM

## 2022-12-16 DIAGNOSIS — S92.002D CLOSED DISPLACED FRACTURE OF LEFT CALCANEUS WITH ROUTINE HEALING, UNSPECIFIED PORTION OF CALCANEUS, SUBSEQUENT ENCOUNTER: Primary | ICD-10-CM

## 2022-12-16 DIAGNOSIS — S92.901D CLOSED FRACTURE OF RIGHT FOOT WITH ROUTINE HEALING: ICD-10-CM

## 2022-12-19 ENCOUNTER — OFFICE VISIT (OUTPATIENT)
Dept: ORTHOPEDIC SURGERY | Age: 22
End: 2022-12-19
Payer: COMMERCIAL

## 2022-12-19 VITALS — WEIGHT: 134 LBS | HEIGHT: 62 IN | RESPIRATION RATE: 16 BRPM | BODY MASS INDEX: 24.66 KG/M2 | OXYGEN SATURATION: 100 %

## 2022-12-19 DIAGNOSIS — Z96.9 RETAINED ORTHOPEDIC HARDWARE: ICD-10-CM

## 2022-12-19 DIAGNOSIS — S92.301D MULTIPLE CLOSED FRACTURES OF METATARSAL BONE OF RIGHT FOOT WITH ROUTINE HEALING, SUBSEQUENT ENCOUNTER: ICD-10-CM

## 2022-12-19 DIAGNOSIS — S92.002D CLOSED DISPLACED FRACTURE OF LEFT CALCANEUS WITH ROUTINE HEALING, UNSPECIFIED PORTION OF CALCANEUS, SUBSEQUENT ENCOUNTER: Primary | ICD-10-CM

## 2022-12-19 PROCEDURE — 1036F TOBACCO NON-USER: CPT | Performed by: ORTHOPAEDIC SURGERY

## 2022-12-19 PROCEDURE — G8484 FLU IMMUNIZE NO ADMIN: HCPCS | Performed by: ORTHOPAEDIC SURGERY

## 2022-12-19 PROCEDURE — 99213 OFFICE O/P EST LOW 20 MIN: CPT | Performed by: ORTHOPAEDIC SURGERY

## 2022-12-19 PROCEDURE — G8420 CALC BMI NORM PARAMETERS: HCPCS | Performed by: ORTHOPAEDIC SURGERY

## 2022-12-19 PROCEDURE — G8427 DOCREV CUR MEDS BY ELIG CLIN: HCPCS | Performed by: ORTHOPAEDIC SURGERY

## 2022-12-19 NOTE — PROGRESS NOTES
[facility]  [address] [address]  [address] [place]  [place] [address] [address]  Dept: [phone]    Ambulatory Orthopedic Follow Up Visit    Preoperative Diagnosis:   Left displaced intra-articular calcaneus fracture  Right midfoot/Lisfranc injury (fracture of the base of the second through fourth metatarsals, avulsion fracture of the medial cuneiform, bone contusions of the first metatarsal base and medial cuneiform)  Body mass index is 22.86 kg/m². Postoperative Diagnosis:   Same as above     Procedures Performed:  (12/16/2021)  Left calcaneus fracture open reduction internal fixation, using small incision technique  Right foot open treatment of tarsometatarsal dislocation with internal fixation (first and second TMT joint)  Right foot open treatment of metatarsal fractures (second metatarsal fracture)  Right foot open treatment of tarsal dislocation (intercuneiform joint)  Right foot stress x-rays under fluoroscopy      CHIEF COMPLAINT:    Chief Complaint   Patient presents with    Foot Pain     Bilateral            HISTORY OF PRESENT ILLNESS:       The patient is a 23 y.o. female who is being seen for evaluation of the above. Since being seen last, the patient is doing better. At today's visit, she is using cam boots bilaterally. History is obtained today from:   [x]  the patient     []  EMR     [x]  one family member/friend    []  multiple family members/friends    []  other:      Notably, a member of the office staff is present for the entire patient interaction, Jayda Ruth RN. At today's visit, the the patient is here with her grandmother, and they report that she has not yet started physical therapy. She reports that she gets some mild pain in the left plantar heel, but denies any pain in the left posterior heel and lateral hindfoot; she reports that she never gets any pain in the right foot.   Since her last visit, she was hospitalized again due to mental health issues. INTERVAL HISTORY 12/19/2022:  She is seen again today in the office for follow up of a previous issue (as above). Since being seen last, the patient is doing better overall (reports that she is getting pain on the left more often). At today's visit, she is not using a brace or assistive device. History is obtained today from:   [x]  the patient     []  EMR     []  one family member/friend    []  multiple family members/friends    []  other:      Notably, a member of the office staff is present for the entire patient interaction, Shy Voss MA. REVIEW OF SYSTEMS:  Musculoskeletal: See HPI for pertinent positives     Past Medical History:    She  has a past medical history of ADHD (attention deficit hyperactivity disorder), Anxiety, and Depression. Past Surgical History:    She  has a past surgical history that includes Adenoidectomy; Tonsillectomy (2003); and Foot fracture surgery (Bilateral, 12/16/2021). Current Medications:     Current Outpatient Medications:     clonazePAM (KLONOPIN) 0.5 MG tablet, , Disp: , Rfl:     hydrOXYzine HCl (ATARAX) 25 MG tablet, , Disp: , Rfl:     risperiDONE (RISPERDAL) 2 MG tablet, , Disp: , Rfl:     ondansetron (ZOFRAN ODT) 4 MG disintegrating tablet, Take 1 tablet by mouth every 8 hours as needed for Nausea, Disp: 20 tablet, Rfl: 0    ibuprofen (ADVIL;MOTRIN) 800 MG tablet, Take 1 tablet by mouth every 8 hours as needed for Pain, Disp: 30 tablet, Rfl: 0     Allergies:    Patient has no known allergies. Family History:  family history is not on file.     Social History:   Social History     Occupational History    Not on file   Tobacco Use    Smoking status: Former    Smokeless tobacco: Never   Vaping Use    Vaping Use: Never used   Substance and Sexual Activity    Alcohol use: Yes     Comment: socially    Drug use: Not Currently     Comment: previous marijuan use    Sexual activity: Yes OBJECTIVE:  Resp 16   Ht 5' 2\" (1.575 m)   Wt 134 lb (60.8 kg)   SpO2 100%   BMI 24.51 kg/m²    Psych: awake, alert  Cardio:  well perfused extremities, no cyanosis  Resp:  normal respiratory effort  Musculoskeletal:    RLE:   Incisions clean/dry/intact, no erythema/dehiscence/drainage  Sensation to light touch grossly intact throughout  Warm and well perfused  Grossly neurovascularly intact distally  No signs of infection  No calf swelling/tenderness  No tenderness throughout the midfoot        LLE:   Incisions clean/dry/intact, no erythema/dehiscence/drainage  Sensation to light touch grossly intact throughout  Warm and well perfused  Grossly neurovascularly intact distally  No signs of infection  No calf swelling/tenderness  -Tenderness: Posterior heel over the hardware  -No tenderness of the sinus Tarsi, or posterior heel  -Good subtalar joint range of motion, painless  -Prominence of the posterior aspect of the left heel, possibly related to hardware, no tenderness      RADIOLOGY:   12/19/2022 FINDINGS:  Three weightbearing views (AP, Oblique, Lateral) of the right foot and 3 weightbearing views (Axial, Broden's view, and Lateral) of the left calcaneus were obtained in the office today and reviewed, revealing well-seated intact hardware across the left calcaneus fracture, and well-seated intact hardware across the right midfoot. IMPRESSION: Status post left calcaneus fracture ORIF and right midfoot ORIF. Electronically signed by Larisa Groves MD        FINDINGS:  Three views (AP, Oblique, Lateral) of the right foot and two views (Axial and Lateral) of the left calcaneus were obtained in the office today and reviewed, revealing well-seated intact hardware across the left calcaneus fracture, and well-seated intact hardware across the right midfoot. Interval healing without definitive interval displacement.   On the left, there is questionable interval displacement of the posterior facet, but this may also be secondary to the rotation. IMPRESSION: Status post left calcaneus fracture ORIF and right midfoot ORIF. Electronically signed by Fransico Almazan MD      ASSESSMENT AND PLAN:  Body mass index is 24.51 kg/m². She is status post the above, doing well overall from an orthopedic surgical perspective. Her postoperative course was complicated by not adhering to the recommended DVT prophylaxis regimen, not adhering to the weightbearing precaution, and not getting to physical therapy within the first 6 months postoperatively. She has a history of a left displaced intra-articular calcaneus fracture, and right midfoot injury with findings concerning for a Lisfranc injury (fractures at the base of the second through fourth metatarsals, avulsion fracture of the medial cuneiform, bony contusions of the first metatarsal base and medial cuneiform), sustained on 12/9/2021. Notably, she has a complex past medical history. She has a history of acute psychosis and a jump from a height. We had a discussion today about the likely diagnosis and its natural history, physical exam and imaging findings, as well as various treatment options in detail. Surgically, we again discussed possible removal hardware, from her left and/or right foot. We discussed that she may possibly have a screw bothering her joint on the left, and that a CT scan might reveal this, however, she declined this option at this time. We discussed the expected postoperative course, including the relevant weightbearing restrictions/immobilization. We discussed both surgical and nonsurgical treatment options, and as a result of our discussion, the patient is able to make an informed decision, and states that she wishes to hold off on surgery at this time, as her left posterior heel pain is not bothering her too much. Orders/referrals were placed as below at today's visit.      She may continue activity as tolerated. She has no specific restrictions. All questions were answered and the above plan was agreed upon. The patient will return to clinic in the future as needed. In the future, we may consider removal of hardware surgery as above. At the patient's next visit, depending on how the patient is doing and/or new imaging/labs results, we may consider the following options:    []  Orthotic (OTC)     []  Orthotic (custom)          []  Rocker bottom shoes     []  Brace (OTC)        []  Brace (custom)             []  CAM boot        []  Night splint         []  Heel cups        []  Strap      []  Toe sleeves/splints    []  PT:                     []  Wean out of immobilization   []  Advance activity       []  Topical               []  NSAIDs          []  Maryjane         []  Referral:         []  Stress xrays       []  CT         []  MRI        []  Injection:         []  Consider OR      []  Pick OR date    No follow-ups on file. No orders of the defined types were placed in this encounter. No orders of the defined types were placed in this encounter. Maxine Buchanan MD  Orthopedic Surgery        Please excuse any typos/errors, as this note was created with the assistance of voice recognition software. While intending to generate a document that actually reflects the content of the visit, the document can still have some errors including those of syntax and sound-a-like substitutions which may escape proof reading. In such instances, actual meaning can be extrapolated by context.

## 2022-12-28 ENCOUNTER — TELEPHONE (OUTPATIENT)
Dept: OBGYN CLINIC | Age: 22
End: 2022-12-28

## 2023-02-17 ENCOUNTER — HOSPITAL ENCOUNTER (OUTPATIENT)
Age: 23
Setting detail: OBSERVATION
Discharge: HOME | End: 2023-02-18
Attending: EMERGENCY MEDICINE | Admitting: INTERNAL MEDICINE
Payer: COMMERCIAL

## 2023-02-17 ENCOUNTER — APPOINTMENT (OUTPATIENT)
Dept: CT IMAGING | Age: 23
End: 2023-02-17
Payer: COMMERCIAL

## 2023-02-17 DIAGNOSIS — S92.901D CLOSED FRACTURE OF RIGHT FOOT WITH ROUTINE HEALING: ICD-10-CM

## 2023-02-17 DIAGNOSIS — E86.0 DEHYDRATION: ICD-10-CM

## 2023-02-17 DIAGNOSIS — F25.1 SCHIZOAFFECTIVE DISORDER, DEPRESSIVE TYPE (HCC): ICD-10-CM

## 2023-02-17 DIAGNOSIS — E87.6 HYPOKALEMIA: Primary | ICD-10-CM

## 2023-02-17 LAB
ABSOLUTE EOS #: 0 K/UL (ref 0–0.4)
ABSOLUTE LYMPH #: 1.7 K/UL (ref 1–4.8)
ABSOLUTE MONO #: 0.9 K/UL (ref 0.1–1.3)
ALBUMIN SERPL-MCNC: 5.7 G/DL (ref 3.5–5.2)
ALP SERPL-CCNC: 84 U/L (ref 35–104)
ALT SERPL-CCNC: 22 U/L (ref 5–33)
ANION GAP SERPL CALCULATED.3IONS-SCNC: 19 MMOL/L (ref 9–17)
AST SERPL-CCNC: 20 U/L
BACTERIA: ABNORMAL
BASOPHILS # BLD: 0 % (ref 0–2)
BASOPHILS ABSOLUTE: 0 K/UL (ref 0–0.2)
BILIRUB SERPL-MCNC: 1.4 MG/DL (ref 0.3–1.2)
BILIRUBIN URINE: NEGATIVE
BUN SERPL-MCNC: 22 MG/DL (ref 6–20)
CALCIUM SERPL-MCNC: 10.3 MG/DL (ref 8.6–10.4)
CASTS UA: ABNORMAL /LPF
CHLORIDE SERPL-SCNC: 83 MMOL/L (ref 98–107)
CO2 SERPL-SCNC: 32 MMOL/L (ref 20–31)
COLOR: YELLOW
CREAT SERPL-MCNC: 0.83 MG/DL (ref 0.5–0.9)
EOSINOPHILS RELATIVE PERCENT: 0 % (ref 0–4)
EPITHELIAL CELLS UA: ABNORMAL /HPF
GFR SERPL CREATININE-BSD FRML MDRD: >60 ML/MIN/1.73M2
GLUCOSE SERPL-MCNC: 108 MG/DL (ref 70–99)
GLUCOSE UR STRIP.AUTO-MCNC: NEGATIVE MG/DL
HCG(URINE) PREGNANCY TEST: NEGATIVE
HCT VFR BLD AUTO: 44.2 % (ref 36–46)
HGB BLD-MCNC: 15.5 G/DL (ref 12–16)
KETONES UR STRIP.AUTO-MCNC: ABNORMAL MG/DL
LEUKOCYTE ESTERASE UR QL STRIP.AUTO: ABNORMAL
LIPASE SERPL-CCNC: 55 U/L (ref 13–60)
LYMPHOCYTES # BLD: 13 % (ref 25–45)
MCH RBC QN AUTO: 30 PG (ref 26–34)
MCHC RBC AUTO-ENTMCNC: 35.1 G/DL (ref 31–37)
MCV RBC AUTO: 85.7 FL (ref 80–100)
MONOCYTES # BLD: 7 % (ref 2–8)
MUCUS: ABNORMAL
NITRITE UR QL STRIP.AUTO: NEGATIVE
PDW BLD-RTO: 12.5 % (ref 11.5–14.9)
PLATELET # BLD AUTO: 413 K/UL (ref 150–450)
PMV BLD AUTO: 7.8 FL (ref 6–12)
POTASSIUM SERPL-SCNC: 2.7 MMOL/L (ref 3.7–5.3)
PROT SERPL-MCNC: 9.5 G/DL (ref 6.4–8.3)
PROT UR STRIP.AUTO-MCNC: 7.5 MG/DL (ref 5–8)
PROT UR STRIP.AUTO-MCNC: ABNORMAL MG/DL
RBC # BLD: 5.16 M/UL (ref 4–5.2)
RBC CLUMPS #/AREA URNS AUTO: ABNORMAL /HPF
SEG NEUTROPHILS: 80 % (ref 34–64)
SEGMENTED NEUTROPHILS ABSOLUTE COUNT: 9.9 K/UL (ref 1.3–9.1)
SODIUM SERPL-SCNC: 134 MMOL/L (ref 135–144)
SPECIFIC GRAVITY UA: 1.02 (ref 1–1.03)
TURBIDITY: ABNORMAL
URINE HGB: NEGATIVE
UROBILINOGEN, URINE: NORMAL
WBC # BLD AUTO: 12.5 K/UL (ref 4.5–13.5)
WBC UA: ABNORMAL /HPF

## 2023-02-17 PROCEDURE — 2580000003 HC RX 258: Performed by: EMERGENCY MEDICINE

## 2023-02-17 PROCEDURE — 96366 THER/PROPH/DIAG IV INF ADDON: CPT

## 2023-02-17 PROCEDURE — 80053 COMPREHEN METABOLIC PANEL: CPT

## 2023-02-17 PROCEDURE — 6360000002 HC RX W HCPCS: Performed by: EMERGENCY MEDICINE

## 2023-02-17 PROCEDURE — 83690 ASSAY OF LIPASE: CPT

## 2023-02-17 PROCEDURE — 2580000003 HC RX 258: Performed by: NURSE PRACTITIONER

## 2023-02-17 PROCEDURE — 96361 HYDRATE IV INFUSION ADD-ON: CPT

## 2023-02-17 PROCEDURE — 99285 EMERGENCY DEPT VISIT HI MDM: CPT

## 2023-02-17 PROCEDURE — 87086 URINE CULTURE/COLONY COUNT: CPT

## 2023-02-17 PROCEDURE — 85025 COMPLETE CBC W/AUTO DIFF WBC: CPT

## 2023-02-17 PROCEDURE — 74177 CT ABD & PELVIS W/CONTRAST: CPT

## 2023-02-17 PROCEDURE — G0378 HOSPITAL OBSERVATION PER HR: HCPCS

## 2023-02-17 PROCEDURE — 96368 THER/DIAG CONCURRENT INF: CPT

## 2023-02-17 PROCEDURE — 81001 URINALYSIS AUTO W/SCOPE: CPT

## 2023-02-17 PROCEDURE — 36415 COLL VENOUS BLD VENIPUNCTURE: CPT

## 2023-02-17 PROCEDURE — 6360000004 HC RX CONTRAST MEDICATION: Performed by: EMERGENCY MEDICINE

## 2023-02-17 PROCEDURE — 96365 THER/PROPH/DIAG IV INF INIT: CPT

## 2023-02-17 PROCEDURE — 6370000000 HC RX 637 (ALT 250 FOR IP): Performed by: EMERGENCY MEDICINE

## 2023-02-17 PROCEDURE — 96375 TX/PRO/DX INJ NEW DRUG ADDON: CPT

## 2023-02-17 PROCEDURE — 6370000000 HC RX 637 (ALT 250 FOR IP): Performed by: NURSE PRACTITIONER

## 2023-02-17 PROCEDURE — 81025 URINE PREGNANCY TEST: CPT

## 2023-02-17 RX ORDER — ACETAMINOPHEN 650 MG/1
650 SUPPOSITORY RECTAL EVERY 6 HOURS PRN
Status: DISCONTINUED | OUTPATIENT
Start: 2023-02-17 | End: 2023-02-18 | Stop reason: HOSPADM

## 2023-02-17 RX ORDER — SODIUM CHLORIDE 0.9 % (FLUSH) 0.9 %
10 SYRINGE (ML) INJECTION PRN
Status: DISCONTINUED | OUTPATIENT
Start: 2023-02-17 | End: 2023-02-18 | Stop reason: HOSPADM

## 2023-02-17 RX ORDER — POTASSIUM CHLORIDE 7.45 MG/ML
10 INJECTION INTRAVENOUS ONCE
Status: COMPLETED | OUTPATIENT
Start: 2023-02-17 | End: 2023-02-17

## 2023-02-17 RX ORDER — POTASSIUM CHLORIDE 20 MEQ/1
40 TABLET, EXTENDED RELEASE ORAL ONCE
Status: COMPLETED | OUTPATIENT
Start: 2023-02-17 | End: 2023-02-17

## 2023-02-17 RX ORDER — ONDANSETRON 2 MG/ML
4 INJECTION INTRAMUSCULAR; INTRAVENOUS ONCE
Status: COMPLETED | OUTPATIENT
Start: 2023-02-17 | End: 2023-02-17

## 2023-02-17 RX ORDER — 0.9 % SODIUM CHLORIDE 0.9 %
1000 INTRAVENOUS SOLUTION INTRAVENOUS ONCE
Status: COMPLETED | OUTPATIENT
Start: 2023-02-17 | End: 2023-02-17

## 2023-02-17 RX ORDER — ENOXAPARIN SODIUM 100 MG/ML
40 INJECTION SUBCUTANEOUS DAILY
Status: DISCONTINUED | OUTPATIENT
Start: 2023-02-18 | End: 2023-02-18 | Stop reason: HOSPADM

## 2023-02-17 RX ORDER — ONDANSETRON 4 MG/1
4 TABLET, ORALLY DISINTEGRATING ORAL EVERY 8 HOURS PRN
Status: DISCONTINUED | OUTPATIENT
Start: 2023-02-17 | End: 2023-02-18 | Stop reason: HOSPADM

## 2023-02-17 RX ORDER — SODIUM CHLORIDE 9 MG/ML
INJECTION, SOLUTION INTRAVENOUS PRN
Status: DISCONTINUED | OUTPATIENT
Start: 2023-02-17 | End: 2023-02-18 | Stop reason: HOSPADM

## 2023-02-17 RX ORDER — ACETAMINOPHEN 325 MG/1
650 TABLET ORAL EVERY 6 HOURS PRN
Status: DISCONTINUED | OUTPATIENT
Start: 2023-02-17 | End: 2023-02-18 | Stop reason: HOSPADM

## 2023-02-17 RX ORDER — POTASSIUM CHLORIDE 20 MEQ/1
40 TABLET, EXTENDED RELEASE ORAL PRN
Status: DISCONTINUED | OUTPATIENT
Start: 2023-02-17 | End: 2023-02-18 | Stop reason: HOSPADM

## 2023-02-17 RX ORDER — ONDANSETRON 2 MG/ML
4 INJECTION INTRAMUSCULAR; INTRAVENOUS EVERY 6 HOURS PRN
Status: DISCONTINUED | OUTPATIENT
Start: 2023-02-17 | End: 2023-02-18 | Stop reason: HOSPADM

## 2023-02-17 RX ORDER — HYDROXYZINE HYDROCHLORIDE 25 MG/1
50 TABLET, FILM COATED ORAL 3 TIMES DAILY PRN
Status: DISCONTINUED | OUTPATIENT
Start: 2023-02-17 | End: 2023-02-18 | Stop reason: HOSPADM

## 2023-02-17 RX ORDER — PROMETHAZINE HYDROCHLORIDE 25 MG/1
25 TABLET ORAL EVERY 6 HOURS PRN
Status: DISCONTINUED | OUTPATIENT
Start: 2023-02-17 | End: 2023-02-18 | Stop reason: HOSPADM

## 2023-02-17 RX ORDER — SODIUM CHLORIDE 9 MG/ML
INJECTION, SOLUTION INTRAVENOUS CONTINUOUS
Status: DISCONTINUED | OUTPATIENT
Start: 2023-02-17 | End: 2023-02-18 | Stop reason: HOSPADM

## 2023-02-17 RX ORDER — ARIPIPRAZOLE 15 MG/1
15 TABLET ORAL DAILY
Status: DISCONTINUED | OUTPATIENT
Start: 2023-02-18 | End: 2023-02-18 | Stop reason: HOSPADM

## 2023-02-17 RX ORDER — ARIPIPRAZOLE 15 MG/1
15 TABLET ORAL DAILY
COMMUNITY

## 2023-02-17 RX ORDER — POTASSIUM CHLORIDE 7.45 MG/ML
10 INJECTION INTRAVENOUS PRN
Status: DISCONTINUED | OUTPATIENT
Start: 2023-02-17 | End: 2023-02-18 | Stop reason: HOSPADM

## 2023-02-17 RX ORDER — SODIUM CHLORIDE 0.9 % (FLUSH) 0.9 %
5-40 SYRINGE (ML) INJECTION EVERY 12 HOURS SCHEDULED
Status: DISCONTINUED | OUTPATIENT
Start: 2023-02-17 | End: 2023-02-18 | Stop reason: HOSPADM

## 2023-02-17 RX ORDER — 0.9 % SODIUM CHLORIDE 0.9 %
100 INTRAVENOUS SOLUTION INTRAVENOUS ONCE
Status: COMPLETED | OUTPATIENT
Start: 2023-02-17 | End: 2023-02-17

## 2023-02-17 RX ADMIN — POTASSIUM CHLORIDE 40 MEQ: 1500 TABLET, EXTENDED RELEASE ORAL at 17:57

## 2023-02-17 RX ADMIN — CEFTRIAXONE SODIUM 1000 MG: 1 INJECTION, POWDER, FOR SOLUTION INTRAMUSCULAR; INTRAVENOUS at 19:16

## 2023-02-17 RX ADMIN — HYDROXYZINE HYDROCHLORIDE 50 MG: 25 TABLET, FILM COATED ORAL at 21:46

## 2023-02-17 RX ADMIN — SODIUM CHLORIDE, PRESERVATIVE FREE 10 ML: 5 INJECTION INTRAVENOUS at 19:02

## 2023-02-17 RX ADMIN — SODIUM CHLORIDE 100 ML: 9 INJECTION, SOLUTION INTRAVENOUS at 19:06

## 2023-02-17 RX ADMIN — POTASSIUM CHLORIDE 10 MEQ: 7.46 INJECTION, SOLUTION INTRAVENOUS at 17:57

## 2023-02-17 RX ADMIN — SODIUM CHLORIDE 1000 ML: 9 INJECTION, SOLUTION INTRAVENOUS at 16:59

## 2023-02-17 RX ADMIN — SODIUM CHLORIDE: 9 INJECTION, SOLUTION INTRAVENOUS at 21:39

## 2023-02-17 RX ADMIN — IOPAMIDOL 75 ML: 755 INJECTION, SOLUTION INTRAVENOUS at 19:02

## 2023-02-17 RX ADMIN — ONDANSETRON 4 MG: 2 INJECTION INTRAMUSCULAR; INTRAVENOUS at 16:58

## 2023-02-17 RX ADMIN — Medication 10 ML: at 21:30

## 2023-02-17 ASSESSMENT — ENCOUNTER SYMPTOMS
COUGH: 0
SORE THROAT: 0
BACK PAIN: 0
DIARRHEA: 0
VOMITING: 1
SHORTNESS OF BREATH: 0
EYE PAIN: 0
EYE REDNESS: 0
ABDOMINAL PAIN: 1

## 2023-02-17 ASSESSMENT — PAIN - FUNCTIONAL ASSESSMENT: PAIN_FUNCTIONAL_ASSESSMENT: 0-10

## 2023-02-17 ASSESSMENT — PAIN SCALES - GENERAL: PAINLEVEL_OUTOF10: 9
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
## 2023-02-18 VITALS
HEART RATE: 96 BPM | RESPIRATION RATE: 16 BRPM | OXYGEN SATURATION: 99 % | DIASTOLIC BLOOD PRESSURE: 87 MMHG | SYSTOLIC BLOOD PRESSURE: 130 MMHG | WEIGHT: 120 LBS | BODY MASS INDEX: 22.08 KG/M2 | HEIGHT: 62 IN | TEMPERATURE: 98.4 F

## 2023-02-18 PROBLEM — F25.1 SCHIZOAFFECTIVE DISORDER, DEPRESSIVE TYPE (HCC): Status: ACTIVE | Noted: 2023-02-18

## 2023-02-18 LAB
ANION GAP SERPL CALCULATED.3IONS-SCNC: 7 MMOL/L (ref 9–17)
ANION GAP SERPL CALCULATED.3IONS-SCNC: 8 MMOL/L (ref 9–17)
BUN SERPL-MCNC: 11 MG/DL (ref 6–20)
BUN SERPL-MCNC: 7 MG/DL (ref 6–20)
CALCIUM SERPL-MCNC: 8.4 MG/DL (ref 8.6–10.4)
CALCIUM SERPL-MCNC: 8.5 MG/DL (ref 8.6–10.4)
CHLORIDE SERPL-SCNC: 100 MMOL/L (ref 98–107)
CHLORIDE SERPL-SCNC: 98 MMOL/L (ref 98–107)
CO2 SERPL-SCNC: 28 MMOL/L (ref 20–31)
CO2 SERPL-SCNC: 31 MMOL/L (ref 20–31)
CREAT SERPL-MCNC: 0.52 MG/DL (ref 0.5–0.9)
CREAT SERPL-MCNC: 0.65 MG/DL (ref 0.5–0.9)
GFR SERPL CREATININE-BSD FRML MDRD: >60 ML/MIN/1.73M2
GFR SERPL CREATININE-BSD FRML MDRD: >60 ML/MIN/1.73M2
GLUCOSE SERPL-MCNC: 96 MG/DL (ref 70–99)
GLUCOSE SERPL-MCNC: 99 MG/DL (ref 70–99)
HCT VFR BLD AUTO: 35.9 % (ref 36–46)
HGB BLD-MCNC: 12.3 G/DL (ref 12–16)
MAGNESIUM SERPL-MCNC: 2.3 MG/DL (ref 1.6–2.6)
MCH RBC QN AUTO: 30.1 PG (ref 26–34)
MCHC RBC AUTO-ENTMCNC: 34.2 G/DL (ref 31–37)
MCV RBC AUTO: 88 FL (ref 80–100)
MICROORGANISM SPEC CULT: NORMAL
PDW BLD-RTO: 12.7 % (ref 11.5–14.9)
PLATELET # BLD AUTO: 271 K/UL (ref 150–450)
PMV BLD AUTO: 7.7 FL (ref 6–12)
POTASSIUM SERPL-SCNC: 3 MMOL/L (ref 3.7–5.3)
POTASSIUM SERPL-SCNC: 3.2 MMOL/L (ref 3.7–5.3)
POTASSIUM SERPL-SCNC: 4.1 MMOL/L (ref 3.7–5.3)
RBC # BLD: 4.08 M/UL (ref 4–5.2)
REASON FOR REJECTION: NORMAL
SODIUM SERPL-SCNC: 135 MMOL/L (ref 135–144)
SODIUM SERPL-SCNC: 137 MMOL/L (ref 135–144)
SPECIMEN DESCRIPTION: NORMAL
TROPONIN I SERPL DL<=0.01 NG/ML-MCNC: <6 NG/L (ref 0–14)
TROPONIN I SERPL DL<=0.01 NG/ML-MCNC: <6 NG/L (ref 0–14)
WBC # BLD AUTO: 8.5 K/UL (ref 4.5–13.5)
ZZ NTE CLEAN UP: ORDERED TEST: NORMAL
ZZ NTE WITH NAME CLEAN UP: SPECIMEN SOURCE: NORMAL

## 2023-02-18 PROCEDURE — 99223 1ST HOSP IP/OBS HIGH 75: CPT | Performed by: INTERNAL MEDICINE

## 2023-02-18 PROCEDURE — 6370000000 HC RX 637 (ALT 250 FOR IP): Performed by: NURSE PRACTITIONER

## 2023-02-18 PROCEDURE — 84484 ASSAY OF TROPONIN QUANT: CPT

## 2023-02-18 PROCEDURE — 96376 TX/PRO/DX INJ SAME DRUG ADON: CPT

## 2023-02-18 PROCEDURE — 80048 BASIC METABOLIC PNL TOTAL CA: CPT

## 2023-02-18 PROCEDURE — 6370000000 HC RX 637 (ALT 250 FOR IP): Performed by: INTERNAL MEDICINE

## 2023-02-18 PROCEDURE — 2580000003 HC RX 258: Performed by: NURSE PRACTITIONER

## 2023-02-18 PROCEDURE — 83735 ASSAY OF MAGNESIUM: CPT

## 2023-02-18 PROCEDURE — 96361 HYDRATE IV INFUSION ADD-ON: CPT

## 2023-02-18 PROCEDURE — G0378 HOSPITAL OBSERVATION PER HR: HCPCS

## 2023-02-18 PROCEDURE — 6360000002 HC RX W HCPCS: Performed by: NURSE PRACTITIONER

## 2023-02-18 PROCEDURE — 84132 ASSAY OF SERUM POTASSIUM: CPT

## 2023-02-18 PROCEDURE — 96366 THER/PROPH/DIAG IV INF ADDON: CPT

## 2023-02-18 PROCEDURE — 85027 COMPLETE CBC AUTOMATED: CPT

## 2023-02-18 PROCEDURE — 36415 COLL VENOUS BLD VENIPUNCTURE: CPT

## 2023-02-18 RX ORDER — TRAZODONE HYDROCHLORIDE 50 MG/1
50 TABLET ORAL NIGHTLY PRN
Status: DISCONTINUED | OUTPATIENT
Start: 2023-02-18 | End: 2023-02-18 | Stop reason: HOSPADM

## 2023-02-18 RX ORDER — TRAZODONE HYDROCHLORIDE 50 MG/1
50 TABLET ORAL NIGHTLY PRN
Qty: 30 TABLET | Refills: 0 | Status: SHIPPED | OUTPATIENT
Start: 2023-02-18

## 2023-02-18 RX ORDER — CEPHALEXIN 250 MG/1
250 CAPSULE ORAL 3 TIMES DAILY
Qty: 9 CAPSULE | Refills: 0 | Status: SHIPPED | OUTPATIENT
Start: 2023-02-18 | End: 2023-02-21

## 2023-02-18 RX ORDER — NICOTINE 21 MG/24HR
1 PATCH, TRANSDERMAL 24 HOURS TRANSDERMAL DAILY
Status: DISCONTINUED | OUTPATIENT
Start: 2023-02-18 | End: 2023-02-18 | Stop reason: HOSPADM

## 2023-02-18 RX ADMIN — SODIUM CHLORIDE: 9 INJECTION, SOLUTION INTRAVENOUS at 10:24

## 2023-02-18 RX ADMIN — PROMETHAZINE HYDROCHLORIDE 25 MG: 25 TABLET ORAL at 10:51

## 2023-02-18 RX ADMIN — POTASSIUM CHLORIDE 10 MEQ: 7.46 INJECTION, SOLUTION INTRAVENOUS at 14:46

## 2023-02-18 RX ADMIN — POTASSIUM CHLORIDE 10 MEQ: 7.46 INJECTION, SOLUTION INTRAVENOUS at 10:24

## 2023-02-18 RX ADMIN — POTASSIUM CHLORIDE 10 MEQ: 7.46 INJECTION, SOLUTION INTRAVENOUS at 16:28

## 2023-02-18 RX ADMIN — POTASSIUM CHLORIDE 10 MEQ: 7.46 INJECTION, SOLUTION INTRAVENOUS at 08:40

## 2023-02-18 RX ADMIN — HYDROXYZINE HYDROCHLORIDE 50 MG: 25 TABLET, FILM COATED ORAL at 10:51

## 2023-02-18 RX ADMIN — POTASSIUM CHLORIDE 10 MEQ: 7.46 INJECTION, SOLUTION INTRAVENOUS at 11:56

## 2023-02-18 RX ADMIN — POTASSIUM CHLORIDE 40 MEQ: 1500 TABLET, EXTENDED RELEASE ORAL at 00:57

## 2023-02-18 RX ADMIN — ARIPIPRAZOLE 15 MG: 15 TABLET ORAL at 08:32

## 2023-02-18 RX ADMIN — ACETAMINOPHEN 650 MG: 325 TABLET ORAL at 10:51

## 2023-02-18 RX ADMIN — POTASSIUM CHLORIDE 10 MEQ: 7.46 INJECTION, SOLUTION INTRAVENOUS at 13:22

## 2023-02-18 NOTE — PLAN OF CARE
Problem: Discharge Planning  Goal: Discharge to home or other facility with appropriate resources  2/18/2023 1607 by Mynor Mijares RN  Outcome: Progressing
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
## 2023-02-18 NOTE — PROGRESS NOTES
Pt refusing morning lab redraw. Writer educated Pt on the reason for the redraw and the importance of getting labs. Pt still refusing at this time.

## 2023-02-18 NOTE — H&P
BRUNA V[address] 55    HISTORY AND PHYSICAL EXAMINATION            Date:   2/18/2023  Patient name:  Torri Castillo  Date of admission:  2/17/2023  4:33 PM  MRN:   [id number]  Account:  [de-identified]  YOB: 2000  PCP:    LYNNE Alberto CNP  Room:   2119/2119-01  Code Status:    Full Code    Chief Complaint:     Chief Complaint   Patient presents with    Emesis       History Obtained From:     patient, electronic medical record    History of Present Illness: The patient is a 26 y.o. Non- / non  female who presents with Emesis   and she is admitted to the hospital for the management of    and is admitted to the hospital for the management of Hypokalemia. According to patient, she has been having episodes of nausea and vomiting intermittently for the past month. Reports that initially, she would have 1 episode a week, which then increased to 1 episode daily, and now patient reports that she has nausea and vomiting every time she eats for the past 3 days. Patient states that she does not put her fingers down her throat but feels as if she is causing herself to vomit and is concerned that she has an eating disorder. States that she frequently hears voices and loud sounds, then becomes anxious and queasy, t which then causes her to vomit. Symptoms are associated with diffuse abdominal tenderness, dysuria, and urinary urgency. Denies fever, chills, chest pain, cough, shortness of breath, and diarrhea. Reports previous episode of similar symptoms approximately 6 months ago, which resolved after coming to the ED and receiving IV fluids. There are no aggravating or alleviating factors. Symptoms are reported as intermittent and moderate; progressively worsening with time.         Past Medical History:     Past Medical History:   Diagnosis Date    ADHD (attention deficit hyperactivity disorder)     Anxiety     Depression         Past Surgical History:     Past Surgical History:   Procedure Laterality Date    ADENOIDECTOMY      FOOT FRACTURE SURGERY Bilateral 12/16/2021    LEFT CALCANEOUS OPEN REDUCTION INTERNAL FIXATION AND RIGHT FOOT FRACTURES OPEN REDUCTION INTERNAL FIXATION performed by Tera Cushing, MD at [address] [place] [address]        Medications Prior to Admission:     Prior to Admission medications    Medication Sig Start Date End Date Taking? Authorizing Provider   ARIPiprazole (ABILIFY) 15 MG tablet Take 15 mg by mouth daily   Yes Historical Provider, MD   hydrOXYzine HCl (ATARAX) 50 MG tablet Take 50 mg by mouth 3 times daily as needed for Anxiety 5/16/22  Yes Historical Provider, MD   ondansetron (ZOFRAN ODT) 4 MG disintegrating tablet Take 1 tablet by mouth every 8 hours as needed for Nausea 7/6/22  Yes Vince Thomas MD   ibuprofen (ADVIL;MOTRIN) 800 MG tablet Take 1 tablet by mouth every 8 hours as needed for Pain 7/6/22  Yes Vince Thomas MD        Allergies:     Patient has no known allergies. Social History:     Tobacco:    reports that she has quit smoking. She has never used smokeless tobacco.  Alcohol:      reports current alcohol use. Drug Use:  reports that she does not currently use drugs. Family History:     History reviewed. No pertinent family history. Review of Systems:     Positive and Negative as described in HPI. CONSTITUTIONAL:  negative for fevers, chills, sweats, fatigue, weight loss  HEENT:  negative for vision, hearing changes, runny nose, throat pain  RESPIRATORY:  negative for shortness of breath, cough, congestion, wheezing. CARDIOVASCULAR:  negative for chest pain, palpitations.   GASTROINTESTINAL: Abdominal pain, nausea, vomiting which is improving  GENITOURINARY:  negative for difficulty of urination, burning with urination, frequency   INTEGUMENT:  negative for rash, skin lesions, easy bruising   HEMATOLOGIC/LYMPHATIC:  negative for swelling/edema ALLERGIC/IMMUNOLOGIC:  negative for urticaria , itching  ENDOCRINE:  negative increase in drinking, increase in urination, hot or cold intolerance  MUSCULOSKELETAL:  negative joint pains, muscle aches, swelling of joints  NEUROLOGICAL:  negative for headaches, dizziness, lightheadedness, numbness, pain, tingling extremities  BEHAVIOR/PSYCH: Anxious, depressed, speaking in monosyllables  Physical Exam:   /78   Pulse 82   Temp 98 °F (36.7 °C) (Oral)   Resp 18   Ht 5' 2\" (1.575 m)   Wt 120 lb (54.4 kg)   LMP 2023   SpO2 99%   BMI 21.95 kg/m²   Temp (24hrs), Av.1 °F (36.7 °C), Min:97.6 °F (36.4 °C), Max:98.3 °F (36.8 °C)    No results for input(s): POCGLU in the last 72 hours. No intake or output data in the 24 hours ending 23 0954    General Appearance:  alert, well appearing, and in no acute distress, thin built person  Mental status: oriented to person, place, and time with normal affect  Head:  normocephalic, atraumatic. Eye: no icterus, redness, pupils equal and reactive, extraocular eye movements intact, conjunctiva clear  Ear: normal external ear, no discharge, hearing intact  Nose:  no drainage noted  Mouth: mucous membranes moist  Neck: supple, no carotid bruits, thyroid not palpable  Lungs: Bilateral equal air entry, clear to ausculation, no wheezing, rales or rhonchi, normal effort  Cardiovascular: normal rate, regular rhythm, no murmur, gallop, rub.   Abdomen: Soft, nontender, nondistended, normal bowel sounds, no hepatomegaly or splenomegaly  Neurologic: There are no new focal motor or sensory deficits, normal muscle tone and bulk, no abnormal sensation, normal speech, cranial nerves II through XII grossly intact  Skin: No gross lesions, rashes, bruising or bleeding on exposed skin area  Extremities:  peripheral pulses palpable, no pedal edema or calf pain with palpation  Psych: normal affect     Investigations:      Laboratory Testing:  Recent Results (from the past 24 hour(s))   CBC with Auto Differential    Collection Time: 02/17/23  5:02 PM   Result Value Ref Range    WBC 12.5 4.5 - 13.5 k/uL    RBC 5.16 4.0 - 5.2 m/uL    Hemoglobin 15.5 12.0 - 16.0 g/dL    Hematocrit 44.2 36 - 46 %    MCV 85.7 80 - 100 fL    MCH 30.0 26 - 34 pg    MCHC 35.1 31 - 37 g/dL    RDW 12.5 11.5 - 14.9 %    Platelets 882 182 - 050 k/uL    MPV 7.8 6.0 - 12.0 fL    Seg Neutrophils 80 (H) 34 - 64 %    Lymphocytes 13 (L) 25 - 45 %    Monocytes 7 2 - 8 %    Eosinophils % 0 0 - 4 %    Basophils 0 0 - 2 %    Segs Absolute 9.90 (H) 1.3 - 9.1 k/uL    Absolute Lymph # 1.70 1.0 - 4.8 k/uL    Absolute Mono # 0.90 0.1 - 1.3 k/uL    Absolute Eos # 0.00 0.0 - 0.4 k/uL    Basophils Absolute 0.00 0.0 - 0.2 k/uL   CMP    Collection Time: 02/17/23  5:02 PM   Result Value Ref Range    Glucose 108 (H) 70 - 99 mg/dL    BUN 22 (H) 6 - 20 mg/dL    Creatinine 0.83 0.50 - 0.90 mg/dL    Est, Glom Filt Rate >60 >60 mL/min/1.73m2    Calcium 10.3 8.6 - 10.4 mg/dL    Sodium 134 (L) 135 - 144 mmol/L    Potassium 2.7 (LL) 3.7 - 5.3 mmol/L    Chloride 83 (L) 98 - 107 mmol/L    CO2 32 (H) 20 - 31 mmol/L    Anion Gap 19 (H) 9 - 17 mmol/L    Alkaline Phosphatase 84 35 - 104 U/L    ALT 22 5 - 33 U/L    AST 20 <32 U/L    Total Bilirubin 1.4 (H) 0.3 - 1.2 mg/dL    Total Protein 9.5 (H) 6.4 - 8.3 g/dL    Albumin 5.7 (H) 3.5 - 5.2 g/dL   Lipase    Collection Time: 02/17/23  5:02 PM   Result Value Ref Range    Lipase 55 13 - 60 U/L   Urinalysis    Collection Time: 02/17/23  6:24 PM   Result Value Ref Range    Color, UA Yellow Yellow    Turbidity UA Cloudy (A) Clear    Glucose, Ur NEGATIVE NEGATIVE    Bilirubin Urine NEGATIVE NEGATIVE    Ketones, Urine 4+ (A) NEGATIVE    Specific Gravity, UA 1.022 1.000 - 1.030    Urine Hgb NEGATIVE NEGATIVE    pH, UA 7.5 5.0 - 8.0    Protein, UA NEGATIVE  Verified by sulfosalicylic acid test. (A) NEGATIVE    Urobilinogen, Urine Normal Normal    Nitrite, Urine NEGATIVE NEGATIVE    Leukocyte Esterase, Urine SMALL (A) NEGATIVE   Urine Preg (Lab)    Collection Time: 02/17/23  6:24 PM   Result Value Ref Range    HCG(Urine) Pregnancy Test NEGATIVE NEGATIVE   Microscopic Urinalysis    Collection Time: 02/17/23  6:24 PM   Result Value Ref Range    WBC, UA 3 to 5 /HPF    RBC, UA 0 TO 2 /HPF    Casts UA FINE GRANULAR /LPF    Casts UA 6 TO 9 /LPF    Casts UA COARSELY GRANULAR /LPF    Casts UA 0 TO 2 /LPF    Epithelial Cells UA 21 TO 50 /HPF    Bacteria, UA MODERATE (A) None    Mucus, UA 1+ (A) None   Potassium    Collection Time: 02/18/23 12:30 AM   Result Value Ref Range    Potassium 3.2 (L) 3.7 - 5.3 mmol/L   SPECIMEN REJECTION    Collection Time: 02/18/23  4:30 AM   Result Value Ref Range    Specimen Source . BLOOD     Ordered Test  BMPX MG CBC     Reason for Rejection       Unable to perform testing: Results suspect due to history of previous lab results.    Basic Metabolic Panel    Collection Time: 02/18/23  7:31 AM   Result Value Ref Range    Glucose 96 70 - 99 mg/dL    BUN 11 6 - 20 mg/dL    Creatinine 0.65 0.50 - 0.90 mg/dL    Est, Glom Filt Rate >60 >60 mL/min/1.73m2    Calcium 8.4 (L) 8.6 - 10.4 mg/dL    Sodium 137 135 - 144 mmol/L    Potassium 3.0 (L) 3.7 - 5.3 mmol/L    Chloride 98 98 - 107 mmol/L    CO2 31 20 - 31 mmol/L    Anion Gap 8 (L) 9 - 17 mmol/L   CBC    Collection Time: 02/18/23  7:31 AM   Result Value Ref Range    WBC 8.5 4.5 - 13.5 k/uL    RBC 4.08 4.0 - 5.2 m/uL    Hemoglobin 12.3 12.0 - 16.0 g/dL    Hematocrit 35.9 (L) 36 - 46 %    MCV 88.0 80 - 100 fL    MCH 30.1 26 - 34 pg    MCHC 34.2 31 - 37 g/dL    RDW 12.7 11.5 - 14.9 %    Platelets 180 708 - 103 k/uL    MPV 7.7 6.0 - 12.0 fL   Magnesium    Collection Time: 02/18/23  7:31 AM   Result Value Ref Range    Magnesium 2.3 1.6 - 2.6 mg/dL       Imaging/Diagnostics:        Assessment :      Primary Problem  Hypokalemia    Active Hospital Problems    Diagnosis Date Noted    Hypokalemia [E87.6] 12/10/2021       Plan:     Patient status Admit as inpatient in the Progressive Unit/Step down    Patient history of anxiety, depression, has history of hospitalization in the past to psych floor, admitted with severe hypokalemia  Patient had abdominal pain, CT abdomen pelvis done in emergency room was negative  Although patient is denying, she may have underlying eating disorder causing intractable nausea vomiting leading to hypokalemia  Psych consulted  Replacing electrolytes  Concern of UTI, on Rocephin, final urine culture awaited  On DVT prophylaxis with Lovenox  Patient, desperately want to go home, will recheck BMP around 4, if improved, likely discharge home after evaluation by psych on oral antibiotic for UTI    Consultations:   IP CONSULT TO PSYCHIATRY    Patient is admitted as inpatient status because of co-morbidities listed above, severity of signs and symptoms as outlined, requirement for current medical therapies and most importantly because of direct risk to patient if care not provided in a hospital setting. Maura Chavez MD  2/18/2023  9:54 AM    Copy sent to Dr. Mirella Streeter, APRN - CNP    Please note that this chart was generated using voice recognition Dragon dictation software. Although every effort was made to ensure the accuracy of this automated transcription, some errors in transcription may have occurred.

## 2023-02-18 NOTE — PROGRESS NOTES
Lisy Colin is a 23 y.o. Non- / non  female who presents with Emesis   and is admitted to the hospital for the management of Hypokalemia.  According to patient, she has been having episodes of nausea and vomiting intermittently for the past month.  Reports that initially, she would have 1 episode a week, which then increased to 1 episode daily, and now patient reports that she has nausea and vomiting every time she eats for the past 3 days.  Patient states that she does not put her fingers down her throat but feels as if she is causing herself to vomit and is concerned that she has an eating disorder.  States that she frequently hears voices and loud sounds, then becomes anxious and queasy, t which then causes her to vomit.   Symptoms are associated with diffuse abdominal tenderness, dysuria, and urinary urgency.  Denies fever, chills, chest pain, cough, shortness of breath, and diarrhea.  Reports previous episode of similar symptoms approximately 6 months ago, which resolved after coming to the ED and receiving IV fluids.  There are no aggravating or alleviating factors.  Symptoms are reported as intermittent and moderate; progressively worsening with time.        Patient status observation in the Progressive Unit/Step down    Hospital Problems             Last Modified POA    * (Principal) Hypokalemia 2/17/2023 Yes       Hypokalemia  -Potassium 2.7 on arrival  --KCl 40 mEq p.o. and 10 mEq IV given in ED  -Recheck K+ at midnight  -K+ replacement protocol  -Monitor BMP   --Check Mag in am    Abdominal Pain  -CT abdomen/pelvis -no acute findings within the abdomen or pelvis  -Lipase -55  -Reports urinary urgency and dysuria  -Urine hCG negative  -IV fluids overnight    Urinary tract infection  -Urine shows small amount leukocyte Estrace  -IV Rocephin initiated in ED  --Continue on admission  -Urine culture pending    Auditory hallucinations  -Patient reports history of schizophrenia  -reports  hearing voices and loud noises which are causing her to vomit  -Takes Abilify as ordered, but states that sometimes she vomits her meds up  -Consult psych to see in am      Disposition 2 days      Consultations:   [address] Suarez    Patient is admitted as inpatient status because of co-morbidities listed above, severity of signs and symptoms as outlined, requirement for current medical therapies and most importantly because of direct risk to patient if care not provided in a hospital setting. Expected length of stay > 48 hours.     Augusta Sullivan, APRN - CNP  2/17/2023  8:55 PM

## 2023-02-18 NOTE — PROGRESS NOTES
Dr. Ernestine Skinner notified of 1600 BMP results and patient on last bag of IV potassium. Dr. Ernestine Skinner to place d/c order.

## 2023-02-18 NOTE — PROGRESS NOTES
Medication History completed:    New medications: none    Medications discontinued: none    Medications flagged for review: none    Changes to dosing:   Hydroxyzine changed to 50 mg three times daily as needed    Stated allergies: NKDA    Other pertinent information: Medications confirmed with [facility]. The patient reports that when she was seen at urgent care earlier, she was prescribed promethazine, however she did not take any doses prior to presentation to the ED. Medication history completed by Zoe Castillo, PharmD Candidate 2024, under my direct supervision.      Thank you,  Gabriel Leyden, PharmD, BCPS  [phone]

## 2023-02-18 NOTE — PROGRESS NOTES
Discharge instructions reviewed with patient. Patient verbalized understanding and had no questions. Patient being walked out.
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
## 2023-02-18 NOTE — ED NOTES
Report given to  KEITH gerardo from U . Report method by phone   The following was reviewed with receiving RN:   Current vital signs:  /88   Pulse (!) 102   Temp 97.6 °F (36.4 °C) (Oral)   Resp 18   Ht 5' 2\" (1.575 m)   Wt 120 lb (54.4 kg)   LMP 02/03/2023   SpO2 99%   BMI 21.95 kg/m²                MEWS Score: 2     Any medication or safety alerts were reviewed. Any pending diagnostics and notifications were also reviewed, as well as any safety concerns or issues, abnormal labs, abnormal imaging, and abnormal assessment findings. Questions were answered.           Raheel Clancy RN  02/17/23 2055

## 2023-02-18 NOTE — CARE COORDINATION
Case Management Assessment  Initial Evaluation    Date/Time of Evaluation: 2/18/2023 11:38 AM  Assessment Completed by: Cee Willson RN    If patient is discharged prior to next notation, then this note serves as note for discharge by case management. Patient Name: Torri Castillo                   YOB: 2000  Diagnosis: Dehydration [E86.0]  Hypokalemia [E87.6]                   Date / Time: 2/17/2023  4:33 PM    Patient Admission Status: Observation   Readmission Risk (Low < 19, Mod (19-27), High > 27): Readmission Risk Score: 7.1 ( )    Current PCP: LYNNE Alberto CNP  PCP verified by CM? Yes    Chart Reviewed: Yes      History Provided by: Patient  Patient Orientation: Alert and Oriented    Patient Cognition: Alert    Hospitalization in the last 30 days (Readmission):  No    If yes, Readmission Assessment in CM Navigator will be completed. Advance Directives:      Code Status: Full Code   Patient's Primary Decision Maker is: Named in [address] [place] [address]      Discharge Planning:    Patient lives with: Spouse/Significant Other Type of Home: Apartment  Primary Care Giver: Self  Patient Support Systems include: Spouse/Significant Other   Current Financial resources: Medicaid  Current community resources: None  Current services prior to admission: None            Current DME:              Type of Home Care services:  None    ADLS  Prior functional level: Independent in ADLs/IADLs  Current functional level: Independent in ADLs/IADLs    PT AM-PAC:   /24  OT AM-PAC:   /24    Family can provide assistance at DC: Yes  Would you like Case Management to discuss the discharge plan with any other family members/significant others, and if so, who? No  Plans to Return to Present Housing: Yes  Other Identified Issues/Barriers to RETURNING to current housing: No barriers. Potential Assistance needed at discharge: N/A            Potential DME:    None.   Patient expects to discharge to: Apartment  Plan for transportation at discharge: Friends    Financial    Payor: [facility]  [address] / Plan: [facility]  [address] / Product Type: *No Product type* /     Does insurance require precert for SNF: Yes    Potential assistance Purchasing Medications: No  Meds-to-Beds request:  No      [facility] [address] [id number] - [address] [place] [address], [address] [facility]  [address]  [address] [place] [id number]  Phone: [phone] Fax: [phone]    [facility] [address]  [address]  [address]  Phone: [phone] Fax: [phone]      Notes:    Factors facilitating achievement of predicted outcomes: Friend support, Cooperative, and Pleasant    Barriers to discharge: Anxiety. Additional Case Management Notes: The patient is from home, independent with significant other. Patient does not drive. DME: No medical equipment. VNS: Declines. Patient states no needs at this time, independent. Awaiting follow up potassium levels, as well as troponin and EKG.      The Plan for Transition of Care is related to the following treatment goals of Dehydration [E86.0]  Hypokalemia [E87.6]    Jackie Steven RN  Case Management Department  Ph: [phone] Fax: [phone]

## 2023-02-18 NOTE — PROGRESS NOTES
Dr. Von Diop notified patient is having chest and abdominal pain. Patient is unsure if it is her anxiety causing it or not. Order received and placed for 2 trops and ekg.

## 2023-02-19 NOTE — DISCHARGE SUMMARY
[place] [address] Internal Medicine    Discharge Summary     Patient ID: Josie Mohamud  :  2000   MRN: [id number]     ACCOUNT:  [de-identified]   Patient's PCP: LYNNE Mckeon CNP  Admit Date: 2023   Discharge Date: 2023    Length of Stay: 0  Code Status:  Prior  Admitting Physician: Jeni Comer MD  Discharge Physician: Jeni Comer MD     Active Discharge Diagnoses:     Primary Problem  Hypokalemia      [place] Problems    Diagnosis Date Noted    Schizoaffective disorder, depressive type ([facility] [address] [F25.1] 2023     Priority: Medium    Hypokalemia [E87.6] 12/10/2021       Admission Condition:  poor     Discharged Condition: fair    Hospital Stay:     Hospital Course:  Josie Mohamud is a 26 y.o. female who was admitted for the management of Hypokalemia , presented to ER with Emesis  Patient, has history of anxiety, depression, admitted with abdominal pain, found to have hypokalemia  CT abdomen pelvis done in emergency room was negative  Patient also found to have UTI,  Her potassium was replaced  Patient was also evaluated by psychiatrist  Although patient was refusing, there was concern that she might have eating disorder  Patient did not agree to get her dose of Abilify increased  Getting discharge        Significant therapeutic interventions:     Significant Diagnostic Studies:   Labs / Micro:        ,     Radiology:    CT ABDOMEN PELVIS W IV CONTRAST Additional Contrast? None    Result Date: 2023  EXAMINATION: CT OF THE ABDOMEN AND PELVIS WITH CONTRAST 2023 6:53 pm TECHNIQUE: CT of the abdomen and pelvis was performed with the administration of intravenous contrast. Multiplanar reformatted images are provided for review. Automated exposure control, iterative reconstruction, and/or weight based adjustment of the mA/kV was utilized to reduce the radiation dose to as low as reasonably achievable.  COMPARISON: CT abdomen and pelvis 07/06/2022 HISTORY: ORDERING SYSTEM PROVIDED HISTORY: lower abdominal pain, vomiting TECHNOLOGIST PROVIDED HISTORY: lower abdominal pain, vomiting Decision Support Exception - unselect if not a suspected or confirmed emergency medical condition->Emergency Medical Condition (MA) Reason for Exam: lower abdominal pain x 3 days, nausea, vomiting FINDINGS: Liver: Normal Gallbladder: Normal Biliary System: Non-dilated. Pancreas: Normal. Spleen: Normal. Adrenals: Normal. Kidneys: Normal symmetric enhancement. No nephroliths. Ureters: Normal in course and caliber. Bladder: Normal. Pelvis: Normal. Stomach: Normal. Duodenum: Normal. Small Bowel: Normal. Colon: Normal. Appendix: The appendix is normal. Lymph Nodes: No lymphadenopathy. Peritoneum: No ascites or free air. No fluid collection. Retroperitoneum: Normal. Vessels: Normal caliber vessels. Abdominal Wall: Normal. Bones: No acute osseous findings. Endplate irregularity of L2 secondary to Schmorl's node. Lung Bases: Normal. Inferior Mediastinum: Normal.     No acute findings within the abdomen or pelvis. Consultations:    Consults:     Final Specialist Recommendations/Findings:   IP CONSULT TO PSYCHIATRY      The patient was seen and examined on day of discharge and this discharge summary is in conjunction with any daily progress note from day of discharge.     Discharge plan:     Disposition: Home    Physician Follow Up:     Ellis Royal [address] [facility]  [phone]    Follow up in 1 week(s)      LYNNE Smallwood CNP  [facility]  [phone]    Follow up         Requiring Further Evaluation/Follow Up POST HOSPITALIZATION/Incidental Findings:    Diet: cardiac diet    Activity: As tolerated    Instructions to Patient:     Discharge Medications:      Medication List        START taking these medications      cephALEXin 250 MG capsule  Commonly known as: KEFLEX  Take 1 capsule by mouth 3 times daily for 3 days     traZODone 50 MG tablet  Commonly known as: DESYREL  Take 1 tablet by mouth nightly as needed for Sleep            CONTINUE taking these medications      ARIPiprazole 15 MG tablet  Commonly known as: ABILIFY     hydrOXYzine HCl 50 MG tablet  Commonly known as: ATARAX     ondansetron 4 MG disintegrating tablet  Commonly known as: Zofran ODT  Take 1 tablet by mouth every 8 hours as needed for Nausea            STOP taking these medications      clonazePAM 0.5 MG tablet  Commonly known as: KLONOPIN     ibuprofen 800 MG tablet  Commonly known as: ADVIL;MOTRIN     risperiDONE 2 MG tablet  Commonly known as: RISPERDAL               Where to Get Your Medications        These medications were sent to [facility], [address], [address] [facility]      Phone: [phone]   cephALEXin 250 MG capsule  traZODone 50 MG tablet         Time Spent on discharge is  35 mins in patient examination, evaluation, counseling as well as medication reconciliation, prescriptions for required medications, discharge plan and follow up. Electronically signed by   Conrad Reynolds MD  2/19/2023  5:40 PM      Thank you Dr. Brenda Brery, APRN - CNP for the opportunity to be involved in this patient's care.

## 2023-02-20 ENCOUNTER — TELEPHONE (OUTPATIENT)
Dept: FAMILY MEDICINE CLINIC | Age: 23
End: 2023-02-20

## 2023-02-20 NOTE — TELEPHONE ENCOUNTER
Care Transitions Initial Follow Up Call    Outreach made within 2 business days of discharge: Yes    Patient: Pati Mei Patient : 2000   MRN: [id number]  Reason for Admission: There are no discharge diagnoses documented for the most recent discharge. Discharge Date: 23       Spoke with: Unable to lvm mb full    Discharge department/facility: [facility] [address] [place] [address] Interactive Patient Contact:  Was patient able to fill all prescriptions:     Was patient instructed to bring all medications to the follow-up visit:    Is patient taking all medications as directed in the discharge summary? Does patient understand their discharge instructions:     Does patient have questions or concerns that need addressed prior to 7-14 day follow up office visit:     Scheduled appointment with PCP within 7-14 days    Follow Up  No future appointments.     Lilibeth Joiner MA

## 2023-02-22 NOTE — TELEPHONE ENCOUNTER
Care Transitions Initial Follow Up Call    Outreach made within 2 business days of discharge: Yes    Patient: Jovan Peñaloza Patient : 2000   MRN: [id number]  Reason for Admission: There are no discharge diagnoses documented for the most recent discharge. Discharge Date: 23       Spoke with: Vomiting   Discharge department/facility: [address] [place] [address] Interactive Patient Contact:  Was patient able to fill all prescriptions: Yes  Was patient instructed to bring all medications to the follow-up visit: Yes  Is patient taking all medications as directed in the discharge summary? Yes  Does patient understand their discharge instructions: Yes  Does patient have questions or concerns that need addressed prior to 7-14 day follow up office visit: yes -     Scheduled appointment with PCP within 7-14 days    Follow Up  No future appointments.     Jocelyne Feldman MA

## 2023-02-23 NOTE — TELEPHONE ENCOUNTER
Care Transitions Initial Follow Up Call    Outreach made within 2 business days of discharge: Yes    Patient: Jeaneth Myers Patient : 2000   MRN: [id number]  Reason for Admission: There are no discharge diagnoses documented for the most recent discharge. Discharge Date: 23       Spoke with: . [facility] for the patient to        Discharge department/facility: [place], [place]

## 2023-03-23 NOTE — ED TRIAGE NOTES
Mode of arrival (squad #, walk in, police, etc) : walk-in        Chief complaint(s): abd pain, emesis, nausea        Arrival Note (brief scenario, treatment PTA, etc). : Patient reports the above listed symptoms x2 days. Patient reports coming to this ED in the past for the same symptoms. C= \"Have you ever felt that you should Cut down on your drinking? \"  No  A= \"Have people Annoyed you by criticizing your drinking? \"  No  G= \"Have you ever felt bad or Guilty about your drinking? \"  No  E= \"Have you ever had a drink as an Eye-opener first thing in the morning to steady your nerves or to help a hangover? \"  No      Deferred []      Reason for deferring: N/A    *If yes to two or more: probable alcohol abuse. *
Detail Level: Zone
Detail Level: Generalized
Detail Level: Detailed

## 2023-04-24 ENCOUNTER — OFFICE VISIT (OUTPATIENT)
Dept: ORTHOPEDIC SURGERY | Age: 23
End: 2023-04-24

## 2023-04-24 VITALS — OXYGEN SATURATION: 100 % | RESPIRATION RATE: 16 BRPM | WEIGHT: 120 LBS | HEIGHT: 62 IN | BODY MASS INDEX: 22.08 KG/M2

## 2023-04-24 DIAGNOSIS — S92.002D CLOSED DISPLACED FRACTURE OF LEFT CALCANEUS WITH ROUTINE HEALING, UNSPECIFIED PORTION OF CALCANEUS, SUBSEQUENT ENCOUNTER: Primary | ICD-10-CM

## 2023-04-24 DIAGNOSIS — Z96.9 RETAINED ORTHOPEDIC HARDWARE: ICD-10-CM

## 2023-04-24 NOTE — PROGRESS NOTES
as needed for Anxiety, Disp: , Rfl:     ondansetron (ZOFRAN ODT) 4 MG disintegrating tablet, Take 1 tablet by mouth every 8 hours as needed for Nausea, Disp: 20 tablet, Rfl: 0     Allergies:    Patient has no known allergies. Family History:  family history is not on file. Social History:   Social History     Occupational History    Not on file   Tobacco Use    Smoking status: Former    Smokeless tobacco: Never   Vaping Use    Vaping Use: Never used   Substance and Sexual Activity    Alcohol use: Yes     Comment: socially    Drug use: Not Currently     Comment: previous marijuan use    Sexual activity: Yes       OBJECTIVE:  Resp 16   Ht 5' 2\" (1.575 m)   Wt 120 lb (54.4 kg)   SpO2 100%   BMI 21.95 kg/m²    Psych: awake, alert  Cardio:  well perfused extremities, no cyanosis  Resp:  normal respiratory effort  Musculoskeletal:    RLE:  Vascular: Limb well perfused, compartments soft/compressible. Skin: No erythema/ulcers. Intact. Well-healed incision. Neurovascular Status:  Grossly neurovascularly intact throughout   Tenderness to Palpation: None  -      LLE:  Vascular: Limb well perfused, compartments soft/compressible. Skin: No erythema/ulcers. Intact. Well-healed incisions. Neurovascular Status:  Grossly neurovascularly intact throughout   Tenderness to Palpation: Posterior heel greater than sinus Tarsi (minimal at the sinus Tarsi)  -Somewhat diminished subtalar range of motion compared to contralateral side, painless        RADIOLOGY:   4/24/2023 FINDINGS:  Three views (AP, Oblique, Lateral) of the left foot were obtained in the office today and reviewed, revealing intact hardware without evidence of loosening calcaneus. No interval displacement noted. IMPRESSION: Prior calcaneal fixation as above.     Electronically signed by Alexandra Mena MD         FINDINGS:  Three weightbearing views (AP, Oblique, Lateral) of the right foot and 3 weightbearing views (Axial, Broden's view, and

## 2023-05-08 DIAGNOSIS — S92.002D CLOSED DISPLACED FRACTURE OF LEFT CALCANEUS WITH ROUTINE HEALING, UNSPECIFIED PORTION OF CALCANEUS, SUBSEQUENT ENCOUNTER: Primary | ICD-10-CM

## 2023-05-12 ENCOUNTER — HOSPITAL ENCOUNTER (OUTPATIENT)
Dept: PHYSICAL THERAPY | Age: 23
Setting detail: THERAPIES SERIES
Discharge: HOME | End: 2023-05-12
Payer: COMMERCIAL

## 2023-05-12 PROCEDURE — 97110 THERAPEUTIC EXERCISES: CPT

## 2023-05-12 PROCEDURE — 97162 PT EVAL MOD COMPLEX 30 MIN: CPT

## 2023-05-12 NOTE — CONSULTS
[] [address] [facility] TWELVE-STEP [facility] &  Therapy  [address]    [phone]  F: [phone]   [] [address]  [address]   [address]  P: [phone]  F: [phone]  [] [address] &  Therapy  [address]  P: [phone]  F: [phone]   [] [address]  P: [phone]  F: [phone]  [x] [address] [facility] & Therapy  [address] [facility] [address]  [place]: [phone]   F: [phone] [] [facility]  Outpatient Rehabilitation &  Therapy  [facility] [address]   [place]: [phone]  F: [phone]           Physical Therapy Evaluation    Date:  2023   Patient: Ej Riggins  : 2000  MRN: [id number]  Physician: Tabatha Sanchez    Insurance: [facility] 30vs/year  Medical Diagnosis: (L) calcaneus fracture S92.002D  Rehab Codes: (L) ankle pain M25.572  Onset date: 23 referral, previous surgery 21   Next Dr's appt.: 23 preop consults    Subjective:   CC/HPI: Patient presents to therapy for DME evaluation in anticipation of hardware removal and nonweightbearing status in late May. Prior to PT, patient noted onset of symptoms with fracture and hardware implementation on 21 following jump from [employer] building. Previous surgical op report is in [facility] [address] Did therapy following injury, was noncompliant with WB restrictions, but had good outcome with the (R) and minimal to no pain in the (R) side. Currently notes complaints of pain in the (L) with calcaneal pain and mild midfoot pain with walking, attempting (B) heel raise, stairs. Currently has 3 flights of stairs into apartment, this also has two small stairs to get into building.      PMHx: Injury as previously listed  Diagnosis Date Comment Source   ADHD

## 2023-05-25 ENCOUNTER — OFFICE VISIT (OUTPATIENT)
Dept: ORTHOPEDIC SURGERY | Age: 23
End: 2023-05-25
Payer: COMMERCIAL

## 2023-05-25 VITALS — WEIGHT: 120 LBS | BODY MASS INDEX: 22.08 KG/M2 | HEIGHT: 62 IN | OXYGEN SATURATION: 100 % | RESPIRATION RATE: 16 BRPM

## 2023-05-25 DIAGNOSIS — Z96.9 RETAINED ORTHOPEDIC HARDWARE: ICD-10-CM

## 2023-05-25 DIAGNOSIS — S92.002D CLOSED DISPLACED FRACTURE OF LEFT CALCANEUS WITH ROUTINE HEALING, UNSPECIFIED PORTION OF CALCANEUS, SUBSEQUENT ENCOUNTER: Primary | ICD-10-CM

## 2023-05-25 PROCEDURE — 99213 OFFICE O/P EST LOW 20 MIN: CPT | Performed by: ORTHOPAEDIC SURGERY

## 2023-05-25 PROCEDURE — G8420 CALC BMI NORM PARAMETERS: HCPCS | Performed by: ORTHOPAEDIC SURGERY

## 2023-05-25 PROCEDURE — 1036F TOBACCO NON-USER: CPT | Performed by: ORTHOPAEDIC SURGERY

## 2023-05-25 PROCEDURE — G8427 DOCREV CUR MEDS BY ELIG CLIN: HCPCS | Performed by: ORTHOPAEDIC SURGERY

## 2023-05-25 NOTE — PROGRESS NOTES
Topical               []  NSAIDs          []  Maryjane         []  Referral:         []  Stress xrays       []  CT         []  MRI        []  Injection:         []  Consider OR      []  Pick OR date    No follow-ups on file. No orders of the defined types were placed in this encounter. No orders of the defined types were placed in this encounter. Josesito Denis MD  Orthopedic Surgery        Please excuse any typos/errors, as this note was created with the assistance of voice recognition software. While intending to generate a document that actually reflects the content of the visit, the document can still have some errors including those of syntax and sound-a-like substitutions which may escape proof reading. In such instances, actual meaning can be extrapolated by context.

## 2023-05-30 ENCOUNTER — ANESTHESIA EVENT (OUTPATIENT)
Dept: OPERATING ROOM | Age: 23
End: 2023-05-30
Payer: COMMERCIAL

## 2023-05-30 NOTE — ED TRIAGE NOTES
Mode of arrival (squad #, walk in, police, etc) : walk in        Chief complaint(s): emesis        Arrival Note (brief scenario, treatment PTA, etc). : pt states she has been having extreme emesis for about 1 month now. Pt states it has been worse the last 2 days. Pt wet to urgent care but they didn't give her anything. Pt has stomach pain and back pain. Pt states she had a cystic ovary that is gone. Pt pt denies diarrhea or constipation         C= \"Have you ever felt that you should Cut down on your drinking? \"  No  A= \"Have people Annoyed you by criticizing your drinking? \"  No  G= \"Have you ever felt bad or Guilty about your drinking? \"  No  E= \"Have you ever had a drink as an Eye-opener first thing in the morning to steady your nerves or to help a hangover? \"  No      Deferred []      Reason for deferring: N/A    *If yes to two or more: probable alcohol abuse. * Complex Repair Preamble Text (Leave Blank If You Do Not Want): Extensive wide undermining was performed.

## 2023-05-31 ENCOUNTER — APPOINTMENT (OUTPATIENT)
Dept: GENERAL RADIOLOGY | Age: 23
End: 2023-05-31
Attending: ORTHOPAEDIC SURGERY
Payer: COMMERCIAL

## 2023-05-31 ENCOUNTER — HOSPITAL ENCOUNTER (OUTPATIENT)
Age: 23
Setting detail: OUTPATIENT SURGERY
Discharge: HOME | End: 2023-05-31
Attending: ORTHOPAEDIC SURGERY | Admitting: ORTHOPAEDIC SURGERY
Payer: COMMERCIAL

## 2023-05-31 ENCOUNTER — ANESTHESIA (OUTPATIENT)
Dept: OPERATING ROOM | Age: 23
End: 2023-05-31
Payer: COMMERCIAL

## 2023-05-31 VITALS
RESPIRATION RATE: 11 BRPM | SYSTOLIC BLOOD PRESSURE: 115 MMHG | OXYGEN SATURATION: 99 % | HEART RATE: 74 BPM | HEIGHT: 62 IN | DIASTOLIC BLOOD PRESSURE: 74 MMHG | BODY MASS INDEX: 23.92 KG/M2 | TEMPERATURE: 97.2 F | WEIGHT: 130 LBS

## 2023-05-31 DIAGNOSIS — Z96.9 RETAINED ORTHOPEDIC HARDWARE: Primary | ICD-10-CM

## 2023-05-31 LAB — HCG, PREGNANCY URINE (POC): NEGATIVE

## 2023-05-31 PROCEDURE — 2500000003 HC RX 250 WO HCPCS: Performed by: NURSE ANESTHETIST, CERTIFIED REGISTERED

## 2023-05-31 PROCEDURE — 6360000002 HC RX W HCPCS: Performed by: ANESTHESIOLOGY

## 2023-05-31 PROCEDURE — 6360000002 HC RX W HCPCS: Performed by: NURSE ANESTHETIST, CERTIFIED REGISTERED

## 2023-05-31 PROCEDURE — 6370000000 HC RX 637 (ALT 250 FOR IP): Performed by: ANESTHESIOLOGY

## 2023-05-31 PROCEDURE — 7100000000 HC PACU RECOVERY - FIRST 15 MIN: Performed by: ORTHOPAEDIC SURGERY

## 2023-05-31 PROCEDURE — 7100000011 HC PHASE II RECOVERY - ADDTL 15 MIN: Performed by: ORTHOPAEDIC SURGERY

## 2023-05-31 PROCEDURE — 6370000000 HC RX 637 (ALT 250 FOR IP): Performed by: ORTHOPAEDIC SURGERY

## 2023-05-31 PROCEDURE — 7100000001 HC PACU RECOVERY - ADDTL 15 MIN: Performed by: ORTHOPAEDIC SURGERY

## 2023-05-31 PROCEDURE — 3700000001 HC ADD 15 MINUTES (ANESTHESIA): Performed by: ORTHOPAEDIC SURGERY

## 2023-05-31 PROCEDURE — C1713 ANCHOR/SCREW BN/BN,TIS/BN: HCPCS | Performed by: ORTHOPAEDIC SURGERY

## 2023-05-31 PROCEDURE — 3700000000 HC ANESTHESIA ATTENDED CARE: Performed by: ORTHOPAEDIC SURGERY

## 2023-05-31 PROCEDURE — 2500000003 HC RX 250 WO HCPCS: Performed by: ANESTHESIOLOGY

## 2023-05-31 PROCEDURE — 3209999900 FLUORO FOR SURGICAL PROCEDURES

## 2023-05-31 PROCEDURE — 81025 URINE PREGNANCY TEST: CPT

## 2023-05-31 PROCEDURE — 2709999900 HC NON-CHARGEABLE SUPPLY: Performed by: ORTHOPAEDIC SURGERY

## 2023-05-31 PROCEDURE — 64447 NJX AA&/STRD FEMORAL NRV IMG: CPT | Performed by: ANESTHESIOLOGY

## 2023-05-31 PROCEDURE — 2580000003 HC RX 258: Performed by: ANESTHESIOLOGY

## 2023-05-31 PROCEDURE — 3600000012 HC SURGERY LEVEL 2 ADDTL 15MIN: Performed by: ORTHOPAEDIC SURGERY

## 2023-05-31 PROCEDURE — 6360000002 HC RX W HCPCS: Performed by: ORTHOPAEDIC SURGERY

## 2023-05-31 PROCEDURE — 7100000010 HC PHASE II RECOVERY - FIRST 15 MIN: Performed by: ORTHOPAEDIC SURGERY

## 2023-05-31 PROCEDURE — 3600000002 HC SURGERY LEVEL 2 BASE: Performed by: ORTHOPAEDIC SURGERY

## 2023-05-31 PROCEDURE — 6360000002 HC RX W HCPCS: Performed by: STUDENT IN AN ORGANIZED HEALTH CARE EDUCATION/TRAINING PROGRAM

## 2023-05-31 RX ORDER — PROPOFOL 10 MG/ML
INJECTION, EMULSION INTRAVENOUS PRN
Status: DISCONTINUED | OUTPATIENT
Start: 2023-05-31 | End: 2023-05-31 | Stop reason: SDUPTHER

## 2023-05-31 RX ORDER — SODIUM CHLORIDE 9 MG/ML
INJECTION, SOLUTION INTRAVENOUS CONTINUOUS
Status: DISCONTINUED | OUTPATIENT
Start: 2023-05-31 | End: 2023-05-31

## 2023-05-31 RX ORDER — FENTANYL CITRATE 50 UG/ML
25 INJECTION, SOLUTION INTRAMUSCULAR; INTRAVENOUS EVERY 5 MIN PRN
Status: DISCONTINUED | OUTPATIENT
Start: 2023-05-31 | End: 2023-05-31 | Stop reason: HOSPADM

## 2023-05-31 RX ORDER — DOCUSATE SODIUM 100 MG/1
100 CAPSULE, LIQUID FILLED ORAL 2 TIMES DAILY PRN
Qty: 20 CAPSULE | Refills: 0 | Status: SHIPPED | OUTPATIENT
Start: 2023-05-31 | End: 2023-06-07

## 2023-05-31 RX ORDER — ACETAMINOPHEN 500 MG
1000 TABLET ORAL ONCE
Status: COMPLETED | OUTPATIENT
Start: 2023-05-31 | End: 2023-05-31

## 2023-05-31 RX ORDER — ONDANSETRON 2 MG/ML
4 INJECTION INTRAMUSCULAR; INTRAVENOUS
Status: DISCONTINUED | OUTPATIENT
Start: 2023-05-31 | End: 2023-05-31 | Stop reason: HOSPADM

## 2023-05-31 RX ORDER — GINSENG 100 MG
CAPSULE ORAL PRN
Status: DISCONTINUED | OUTPATIENT
Start: 2023-05-31 | End: 2023-05-31 | Stop reason: ALTCHOICE

## 2023-05-31 RX ORDER — LIDOCAINE HYDROCHLORIDE 20 MG/ML
INJECTION, SOLUTION EPIDURAL; INFILTRATION; INTRACAUDAL; PERINEURAL PRN
Status: DISCONTINUED | OUTPATIENT
Start: 2023-05-31 | End: 2023-05-31 | Stop reason: SDUPTHER

## 2023-05-31 RX ORDER — BUPIVACAINE HYDROCHLORIDE 5 MG/ML
INJECTION, SOLUTION EPIDURAL; INTRACAUDAL
Status: COMPLETED | OUTPATIENT
Start: 2023-05-31 | End: 2023-05-31

## 2023-05-31 RX ORDER — MIDAZOLAM HYDROCHLORIDE 1 MG/ML
2 INJECTION INTRAMUSCULAR; INTRAVENOUS ONCE
Status: COMPLETED | OUTPATIENT
Start: 2023-05-31 | End: 2023-05-31

## 2023-05-31 RX ORDER — ONDANSETRON 2 MG/ML
INJECTION INTRAMUSCULAR; INTRAVENOUS PRN
Status: DISCONTINUED | OUTPATIENT
Start: 2023-05-31 | End: 2023-05-31 | Stop reason: SDUPTHER

## 2023-05-31 RX ORDER — HYDROMORPHONE HYDROCHLORIDE 1 MG/ML
0.5 INJECTION, SOLUTION INTRAMUSCULAR; INTRAVENOUS; SUBCUTANEOUS EVERY 5 MIN PRN
Status: DISCONTINUED | OUTPATIENT
Start: 2023-05-31 | End: 2023-05-31 | Stop reason: HOSPADM

## 2023-05-31 RX ORDER — SODIUM CHLORIDE 0.9 % (FLUSH) 0.9 %
5-40 SYRINGE (ML) INJECTION EVERY 12 HOURS SCHEDULED
Status: DISCONTINUED | OUTPATIENT
Start: 2023-05-31 | End: 2023-05-31 | Stop reason: HOSPADM

## 2023-05-31 RX ORDER — DIPHENHYDRAMINE HYDROCHLORIDE 50 MG/ML
25 INJECTION INTRAMUSCULAR; INTRAVENOUS ONCE
Status: DISCONTINUED | OUTPATIENT
Start: 2023-05-31 | End: 2023-05-31

## 2023-05-31 RX ORDER — DEXAMETHASONE SODIUM PHOSPHATE 10 MG/ML
INJECTION, SOLUTION INTRAMUSCULAR; INTRAVENOUS PRN
Status: DISCONTINUED | OUTPATIENT
Start: 2023-05-31 | End: 2023-05-31 | Stop reason: SDUPTHER

## 2023-05-31 RX ORDER — ONDANSETRON 4 MG/1
4 TABLET, FILM COATED ORAL EVERY 8 HOURS PRN
Qty: 20 TABLET | Refills: 0 | Status: SHIPPED | OUTPATIENT
Start: 2023-05-31 | End: 2023-06-07

## 2023-05-31 RX ORDER — LIDOCAINE HYDROCHLORIDE 10 MG/ML
1 INJECTION, SOLUTION EPIDURAL; INFILTRATION; INTRACAUDAL; PERINEURAL
Status: DISCONTINUED | OUTPATIENT
Start: 2023-05-31 | End: 2023-05-31 | Stop reason: HOSPADM

## 2023-05-31 RX ORDER — DIPHENHYDRAMINE HYDROCHLORIDE 50 MG/ML
25 INJECTION INTRAMUSCULAR; INTRAVENOUS ONCE
Status: COMPLETED | OUTPATIENT
Start: 2023-05-31 | End: 2023-05-31

## 2023-05-31 RX ORDER — ASPIRIN 325 MG
325 TABLET, DELAYED RELEASE (ENTERIC COATED) ORAL DAILY
Qty: 42 TABLET | Refills: 0 | Status: SHIPPED | OUTPATIENT
Start: 2023-05-31 | End: 2023-07-12

## 2023-05-31 RX ORDER — SODIUM CHLORIDE, SODIUM LACTATE, POTASSIUM CHLORIDE, CALCIUM CHLORIDE 600; 310; 30; 20 MG/100ML; MG/100ML; MG/100ML; MG/100ML
INJECTION, SOLUTION INTRAVENOUS CONTINUOUS
Status: DISCONTINUED | OUTPATIENT
Start: 2023-05-31 | End: 2023-05-31 | Stop reason: HOSPADM

## 2023-05-31 RX ORDER — KETOROLAC TROMETHAMINE 30 MG/ML
INJECTION, SOLUTION INTRAMUSCULAR; INTRAVENOUS PRN
Status: DISCONTINUED | OUTPATIENT
Start: 2023-05-31 | End: 2023-05-31 | Stop reason: SDUPTHER

## 2023-05-31 RX ORDER — FENTANYL CITRATE 50 UG/ML
INJECTION, SOLUTION INTRAMUSCULAR; INTRAVENOUS PRN
Status: DISCONTINUED | OUTPATIENT
Start: 2023-05-31 | End: 2023-05-31 | Stop reason: SDUPTHER

## 2023-05-31 RX ORDER — HYDROCODONE BITARTRATE AND ACETAMINOPHEN 5; 325 MG/1; MG/1
1 TABLET ORAL EVERY 6 HOURS PRN
Qty: 15 TABLET | Refills: 0 | Status: SHIPPED | OUTPATIENT
Start: 2023-05-31 | End: 2023-06-07

## 2023-05-31 RX ORDER — SULFAMETHOXAZOLE AND TRIMETHOPRIM 800; 160 MG/1; MG/1
1 TABLET ORAL 2 TIMES DAILY
Qty: 10 TABLET | Refills: 0 | Status: SHIPPED | OUTPATIENT
Start: 2023-05-31 | End: 2023-06-05

## 2023-05-31 RX ORDER — SODIUM CHLORIDE 9 MG/ML
INJECTION, SOLUTION INTRAVENOUS PRN
Status: DISCONTINUED | OUTPATIENT
Start: 2023-05-31 | End: 2023-05-31 | Stop reason: HOSPADM

## 2023-05-31 RX ORDER — SODIUM CHLORIDE 0.9 % (FLUSH) 0.9 %
5-40 SYRINGE (ML) INJECTION PRN
Status: DISCONTINUED | OUTPATIENT
Start: 2023-05-31 | End: 2023-05-31 | Stop reason: HOSPADM

## 2023-05-31 RX ADMIN — PROPOFOL 150 MG: 10 INJECTION, EMULSION INTRAVENOUS at 10:05

## 2023-05-31 RX ADMIN — SODIUM CHLORIDE, POTASSIUM CHLORIDE, SODIUM LACTATE AND CALCIUM CHLORIDE: 600; 310; 30; 20 INJECTION, SOLUTION INTRAVENOUS at 08:23

## 2023-05-31 RX ADMIN — KETOROLAC TROMETHAMINE 30 MG: 30 INJECTION, SOLUTION INTRAMUSCULAR; INTRAVENOUS at 10:50

## 2023-05-31 RX ADMIN — LIDOCAINE HYDROCHLORIDE 60 MG: 20 INJECTION, SOLUTION EPIDURAL; INFILTRATION; INTRACAUDAL; PERINEURAL at 10:05

## 2023-05-31 RX ADMIN — PROPOFOL 50 MG: 10 INJECTION, EMULSION INTRAVENOUS at 10:09

## 2023-05-31 RX ADMIN — BUPIVACAINE HYDROCHLORIDE 35 ML: 5 INJECTION, SOLUTION EPIDURAL; INTRACAUDAL; PERINEURAL at 09:47

## 2023-05-31 RX ADMIN — DIPHENHYDRAMINE HYDROCHLORIDE 25 MG: 50 INJECTION, SOLUTION INTRAMUSCULAR; INTRAVENOUS at 08:24

## 2023-05-31 RX ADMIN — ACETAMINOPHEN 1000 MG: 500 TABLET ORAL at 08:24

## 2023-05-31 RX ADMIN — ONDANSETRON 4 MG: 2 INJECTION INTRAMUSCULAR; INTRAVENOUS at 10:50

## 2023-05-31 RX ADMIN — MIDAZOLAM 2 MG: 1 INJECTION INTRAMUSCULAR; INTRAVENOUS at 09:48

## 2023-05-31 RX ADMIN — FENTANYL CITRATE 50 MCG: 50 INJECTION INTRAMUSCULAR; INTRAVENOUS at 10:05

## 2023-05-31 RX ADMIN — DEXAMETHASONE SODIUM PHOSPHATE 10 MG: 10 INJECTION, SOLUTION INTRAMUSCULAR; INTRAVENOUS at 10:16

## 2023-05-31 RX ADMIN — FENTANYL CITRATE 50 MCG: 50 INJECTION INTRAMUSCULAR; INTRAVENOUS at 10:11

## 2023-05-31 RX ADMIN — Medication 2000 MG: at 10:13

## 2023-05-31 ASSESSMENT — PAIN SCALES - GENERAL
PAINLEVEL_OUTOF10: 0
PAINLEVEL_OUTOF10: 0

## 2023-05-31 NOTE — OP NOTE
[place] [id number] [address]  Dept: [phone]: [phone]      Orthopedic Surgery Operative Report      Patient: Ana Vargas      MRN#: [id number]     YOB: 2000      Date of Admission: 5/31/2023    Attending Surgeon: De Page M.D.   PCP: LYNNE Hernandez CNP        Preoperative Diagnosis:   Left foot retained hardware   History of psychosis  Body mass index is 23.78 kg/m². Postoperative Diagnosis:   Same     Procedures Performed:   (5/31/2023)  Left foot removal of hardware        Implants:    none      Attending Surgeon:     Hans Alfaro MD      Assistant Surgeon:    Resident: Alex Patino DO      Anesthesia:    General      Staff:  Surgeon(s):  Jenaro Romero MD  Scrub Person First: Yannick Torres  Anesthesia: Boby Mart MD      Estimated Blood Loss:    Minimal      Complications:    None      Specimen:    * No specimens in log *      History:    Ms. Ana Vargas is a 26 y.o. female who was seen recently for the above problem. She has a history of a left displaced intra-articular calcaneus fracture, sustained on 12/9/2021, status post left calcaneus ORIF (on 12/16/2021). Her postoperative course was complicated by not adhering to the recommended DVT prophylaxis regimen, not adhering to the weightbearing precaution, and not getting to physical therapy within the first 6 months postoperatively. Postoperatively, she developed symptomatic hardware in her left calcaneus. She also has a history of a right midfoot injury with findings concerning for a Lisfranc injury (fractures at the base of the second through fourth metatarsals, avulsion fracture of the medial cuneiform, bony contusions of the first metatarsal base and medial cuneiform), sustained on 12/9/2021, status post right midfoot ORIF (on 12/16/2021). Notably, she has a complex past medical history.   She has a history of acute psychosis and a

## 2023-05-31 NOTE — ANESTHESIA POSTPROCEDURE EVALUATION
Department of Anesthesiology  Postprocedure Note    Patient: Hamilton Dakins  MRN: [id number]  YOB: 2000  Date of evaluation: 5/31/2023      Procedure Summary     Date: 05/31/23 Room / Location: [address] [place] [address] / [facility] - INPATIENT    Anesthesia Start: 3935 Anesthesia Stop: 4764    Procedure: Left foot removal of hardware (Left: Foot) Diagnosis:       Encounter for removal of internal fixation device      (Encounter for removal of internal fixation device [Z47.2])    Surgeons: Kylee Carlisle MD Responsible Provider: Rut Morales MD    Anesthesia Type: general, regional ASA Status: 2          Anesthesia Type: No value filed.     Ruben Phase I: Ruben Score: 10    Ruben Phase II: Ruben Score: 10      Anesthesia Post Evaluation    Patient location during evaluation: PACU  Patient participation: complete - patient participated  Level of consciousness: awake  Airway patency: patent  Nausea & Vomiting: no nausea  Complications: no  Cardiovascular status: blood pressure returned to baseline  Respiratory status: acceptable  Hydration status: euvolemic  Comments: Multimodal analgesia pain management as indicated by procedure  Multimodal analgesia pain management approach

## 2023-05-31 NOTE — PROGRESS NOTES
I spoke to the patient before heading to the OR, and the operative site was identified, verified and marked. The procedure was verified. We have reviewed the risks, benefits, and alternatives to the procedure. No guarantees were made. I have also reviewed the history and physical. There are no significant changes in medical history. All questions were answered and they wish to proceed as planned.      Electronically signed by Jerardo Luna MD

## 2023-05-31 NOTE — ANESTHESIA PRE PROCEDURE
Department of Anesthesiology  Preprocedure Note       Name:  Vitor Chandler   Age:  26 y.o.  :  2000                                          MRN:  [id number]         Date:  2023      Surgeon: Stanley Aiken):  Cheryle Gayer, MD    Procedure: Procedure(s):  LEFT FOOT HARDWARE REMOVAL - JAYNA    Medications prior to admission:   Prior to Admission medications    Medication Sig Start Date End Date Taking? Authorizing Provider   traZODone (DESYREL) 50 MG tablet Take 1 tablet by mouth nightly as needed for Sleep 23   Dominguez Rosado MD   ARIPiprazole (ABILIFY) 15 MG tablet Take 1 tablet by mouth daily    Historical Provider, MD   hydrOXYzine HCl (ATARAX) 50 MG tablet Take 1 tablet by mouth 3 times daily as needed for Anxiety 22   Historical Provider, MD   ondansetron (ZOFRAN ODT) 4 MG disintegrating tablet Take 1 tablet by mouth every 8 hours as needed for Nausea 22   Clive Baird MD       Current medications:    No current facility-administered medications for this visit. No current outpatient medications on file.      Facility-Administered Medications Ordered in Other Visits   Medication Dose Route Frequency Provider Last Rate Last Admin    lidocaine PF 1 % injection 1 mL  1 mL IntraDERmal Once PRN Nuris De MD        lactated ringers IV soln infusion   IntraVENous Continuous Nuris De MD        ceFAZolin (ANCEF) 2000 mg in 0.9% sodium chloride 50 mL IVPB  2,000 mg IntraVENous Once Cheryle Gayer, MD        acetaminophen (TYLENOL) tablet 1,000 mg  1,000 mg Oral Once Theodore Vilchis MD        midazolam (VERSED) injection 2 mg  2 mg IntraVENous Once Roberta Cosby MD        diphenhydrAMINE (BENADRYL) injection 25 mg  25 mg IntraVENous Once Theodore Vilchis MD           Allergies:  No Known Allergies    Problem List:    Patient Active Problem List   Diagnosis Code    Moderate single current episode of major depressive disorder ([facility] [address] F32.1    Generalized anxiety disorder F41.1

## 2023-05-31 NOTE — DISCHARGE INSTRUCTIONS
[facility]  [address] [address]  Dept: [phone]  Loc: [phone]    Dr. Edis Kiser Post Operative Instructions (Lower Extremity)      Fluids and Diet:    Begin with clear liquids, broth, dry toast, and crackers. If not nauseated, then resume your regular pre-operative diet when you feel ready. Medications: Take your prescriptions as directed. You may resume your regularly scheduled medications (unless otherwise directed). If any side effects or adverse reactions occur, discontinue the medication and contact your doctor. Review the patient drug information that is provided before you take any medication. If you have a fracture or a surgery that involves placing hardware (screws, plates, joint replacement), avoid the routine use of NSAIDs for about 6 months if possible (due to a risk of delayed bone healing). Ambulation and Activity:    You are advised to go directly home from the hospital.  You may not put any weight on the operative foot (unless otherwise directed). Avoid stairs if possible. Do not lift or move heavy objects. Do not drive until cleared by your physician. Bandage and Wound Care Instructions:    Keep splint/dressing clean and dry. Do not shower or bathe the operative extremity. Do not remove the splint/dressing (unless otherwise directed); it will be removed at your first postoperative office visit at about 2 weeks. Do not attempt to put anything between the splint or dressing and your skin; some itching is normal.  If the overlying ace wrap feels too tight, you may gently loosen it. Call the office if you have any questions or concerns. Ice and Elevation:    Elevate operative extremity as often as possible to reduce swelling and discomfort during the first 2 weeks. For the first 2 weeks, keep it elevated almost around the clock, and don't leave it not elevated for longer than 15 minutes at a time.   Elevate with about 2-3

## 2023-05-31 NOTE — ANESTHESIA PROCEDURE NOTES
Peripheral Block    Patient location during procedure: pre-op  Reason for block: procedure for pain, post-op pain management and at surgeon's request  Start time: 5/31/2023 9:47 AM  End time: 5/31/2023 9:57 AM  Staffing  Performed: anesthesiologist   Anesthesiologist: Swapnil Villagomez MD  Preanesthetic Checklist  Completed: patient identified, IV checked, site marked, risks and benefits discussed, surgical/procedural consents, equipment checked, pre-op evaluation, timeout performed, anesthesia consent given, oxygen available, monitors applied/VS acknowledged, fire risk safety assessment completed and verbalized and blood product R/B/A discussed and consented  Peripheral Block   Patient position: supine  Prep: ChloraPrep  Provider prep: sterile gloves and mask  Patient monitoring: cardiac monitor, continuous pulse ox, continuous capnometry, oxygen, IV access, frequent blood pressure checks and responsive to questions  Block type: Femoral and Sciatic  Laterality: left  Injection technique: single-shot  Guidance: ultrasound guided  Local infiltration: lidocaine (8mg decadron)  Infiltration strength: 1 %  Local infiltration: lidocaine (8mg decadron)  Dose: 4 mL    Needle   Needle type: pencil-tip   Needle gauge: 20 G  Needle localization: ultrasound guidance  Assessment   Injection assessment: negative aspiration for heme, no paresthesia on injection, local visualized surrounding nerve on ultrasound and no intravascular symptoms  Paresthesia pain: none  Slow fractionated injection: yes  Hemodynamics: stable  Real-time US image taken/store: yes  Outcomes: uncomplicated and patient tolerated procedure well    Additional Notes  Preprocedure ready time 3262  Medications Administered  bupivacaine (MARCAINE) PF injection 0.5% - Perineural   35 mL - 5/31/2023 9:47:00 AM

## 2023-06-01 ENCOUNTER — CLINICAL DOCUMENTATION (OUTPATIENT)
Dept: ORTHOPEDIC SURGERY | Age: 23
End: 2023-06-01

## 2023-06-01 NOTE — PROGRESS NOTES
I called the patient at home for routine follow up and discussed how she was doing. I reinforced the relevant postoperative restrictions/precautions, discussed the medications I prescribed, and answered all questions. Pain is adequately controlled.

## 2023-07-07 DIAGNOSIS — Z96.9 RETAINED ORTHOPEDIC HARDWARE: Primary | ICD-10-CM

## 2023-07-07 DIAGNOSIS — S92.002D CLOSED DISPLACED FRACTURE OF LEFT CALCANEUS WITH ROUTINE HEALING, UNSPECIFIED PORTION OF CALCANEUS, SUBSEQUENT ENCOUNTER: ICD-10-CM

## 2023-07-12 ENCOUNTER — TELEPHONE (OUTPATIENT)
Dept: ORTHOPEDIC SURGERY | Age: 23
End: 2023-07-12

## 2023-08-28 DIAGNOSIS — Z96.9 RETAINED ORTHOPEDIC HARDWARE: ICD-10-CM

## 2023-08-28 DIAGNOSIS — S92.002D CLOSED DISPLACED FRACTURE OF LEFT CALCANEUS WITH ROUTINE HEALING, UNSPECIFIED PORTION OF CALCANEUS, SUBSEQUENT ENCOUNTER: Primary | ICD-10-CM

## 2023-08-29 ENCOUNTER — TELEPHONE (OUTPATIENT)
Dept: ORTHOPEDIC SURGERY | Age: 23
End: 2023-08-29

## 2023-11-10 DIAGNOSIS — S92.002D CLOSED DISPLACED FRACTURE OF LEFT CALCANEUS WITH ROUTINE HEALING, UNSPECIFIED PORTION OF CALCANEUS, SUBSEQUENT ENCOUNTER: ICD-10-CM

## 2023-11-10 DIAGNOSIS — Z96.9 RETAINED ORTHOPEDIC HARDWARE: Primary | ICD-10-CM

## 2024-04-02 ENCOUNTER — APPOINTMENT (OUTPATIENT)
Dept: CT IMAGING | Age: 24
End: 2024-04-02
Payer: COMMERCIAL

## 2024-04-02 ENCOUNTER — HOSPITAL ENCOUNTER (EMERGENCY)
Age: 24
Discharge: HOME | End: 2024-04-02
Attending: EMERGENCY MEDICINE
Payer: COMMERCIAL

## 2024-04-02 VITALS
SYSTOLIC BLOOD PRESSURE: 146 MMHG | RESPIRATION RATE: 22 BRPM | TEMPERATURE: 98.2 F | HEART RATE: 54 BPM | OXYGEN SATURATION: 98 % | HEIGHT: 62 IN | WEIGHT: 140 LBS | DIASTOLIC BLOOD PRESSURE: 81 MMHG | BODY MASS INDEX: 25.76 KG/M2

## 2024-04-02 DIAGNOSIS — R11.2 NAUSEA AND VOMITING, UNSPECIFIED VOMITING TYPE: ICD-10-CM

## 2024-04-02 DIAGNOSIS — R10.30 LOWER ABDOMINAL PAIN: ICD-10-CM

## 2024-04-02 DIAGNOSIS — N30.00 ACUTE CYSTITIS WITHOUT HEMATURIA: Primary | ICD-10-CM

## 2024-04-02 LAB
ALBUMIN SERPL-MCNC: 5 G/DL (ref 3.5–5.2)
ALP SERPL-CCNC: 78 U/L (ref 35–104)
ALT SERPL-CCNC: 15 U/L (ref 5–33)
AMORPH SED URNS QL MICRO: ABNORMAL
ANION GAP SERPL CALCULATED.3IONS-SCNC: 16 MMOL/L (ref 9–17)
AST SERPL-CCNC: 16 U/L
BACTERIA URNS QL MICRO: ABNORMAL
BASOPHILS # BLD: 0 K/UL (ref 0–0.2)
BASOPHILS NFR BLD: 0 % (ref 0–2)
BILIRUB SERPL-MCNC: 0.7 MG/DL (ref 0.3–1.2)
BILIRUB UR QL STRIP: NEGATIVE
BUN SERPL-MCNC: 13 MG/DL (ref 6–20)
CALCIUM SERPL-MCNC: 9.8 MG/DL (ref 8.6–10.4)
CHLORIDE SERPL-SCNC: 98 MMOL/L (ref 98–107)
CLARITY UR: CLEAR
CO2 SERPL-SCNC: 29 MMOL/L (ref 20–31)
COLOR UR: YELLOW
CREAT SERPL-MCNC: 0.7 MG/DL (ref 0.5–0.9)
EOSINOPHIL # BLD: 0 K/UL (ref 0–0.4)
EOSINOPHILS RELATIVE PERCENT: 0 % (ref 0–4)
EPI CELLS #/AREA URNS HPF: ABNORMAL /HPF
ERYTHROCYTE [DISTWIDTH] IN BLOOD BY AUTOMATED COUNT: 14.5 % (ref 11.5–14.9)
GFR SERPL CREATININE-BSD FRML MDRD: >90 ML/MIN/1.73M2
GLUCOSE SERPL-MCNC: 114 MG/DL (ref 70–99)
GLUCOSE UR STRIP-MCNC: NEGATIVE MG/DL
HCG UR QL: NEGATIVE
HCT VFR BLD AUTO: 43.4 % (ref 36–46)
HGB BLD-MCNC: 14.4 G/DL (ref 12–16)
HGB UR QL STRIP.AUTO: NEGATIVE
KETONES UR STRIP-MCNC: NEGATIVE MG/DL
LEUKOCYTE ESTERASE UR QL STRIP: NEGATIVE
LIPASE SERPL-CCNC: 21 U/L (ref 13–60)
LYMPHOCYTES NFR BLD: 2.1 K/UL (ref 1–4.8)
LYMPHOCYTES RELATIVE PERCENT: 15 % (ref 24–44)
MAGNESIUM SERPL-MCNC: 2.4 MG/DL (ref 1.6–2.6)
MCH RBC QN AUTO: 29.4 PG (ref 26–34)
MCHC RBC AUTO-ENTMCNC: 33.2 G/DL (ref 31–37)
MCV RBC AUTO: 88.6 FL (ref 80–100)
MONOCYTES NFR BLD: 1 K/UL (ref 0.1–1.3)
MONOCYTES NFR BLD: 8 % (ref 1–7)
NEUTROPHILS NFR BLD: 77 % (ref 36–66)
NEUTS SEG NFR BLD: 10.9 K/UL (ref 1.3–9.1)
NITRITE UR QL STRIP: POSITIVE
PH UR STRIP: 6 [PH] (ref 5–8)
PLATELET # BLD AUTO: 383 K/UL (ref 150–450)
PMV BLD AUTO: 7.8 FL (ref 6–12)
POTASSIUM SERPL-SCNC: 3.2 MMOL/L (ref 3.7–5.3)
PROT SERPL-MCNC: 8.1 G/DL (ref 6.4–8.3)
PROT UR STRIP-MCNC: NEGATIVE MG/DL
RBC # BLD AUTO: 4.9 M/UL (ref 4–5.2)
RBC #/AREA URNS HPF: ABNORMAL /HPF
SODIUM SERPL-SCNC: 143 MMOL/L (ref 135–144)
SP GR UR STRIP: 1.01 (ref 1–1.03)
UROBILINOGEN UR STRIP-ACNC: NORMAL EU/DL (ref 0–1)
WBC #/AREA URNS HPF: ABNORMAL /HPF
WBC OTHER # BLD: 14.1 K/UL (ref 3.5–11)

## 2024-04-02 PROCEDURE — 6360000004 HC RX CONTRAST MEDICATION: Performed by: EMERGENCY MEDICINE

## 2024-04-02 PROCEDURE — 96361 HYDRATE IV INFUSION ADD-ON: CPT

## 2024-04-02 PROCEDURE — 6360000002 HC RX W HCPCS: Performed by: EMERGENCY MEDICINE

## 2024-04-02 PROCEDURE — 96375 TX/PRO/DX INJ NEW DRUG ADDON: CPT

## 2024-04-02 PROCEDURE — 80053 COMPREHEN METABOLIC PANEL: CPT

## 2024-04-02 PROCEDURE — 99285 EMERGENCY DEPT VISIT HI MDM: CPT

## 2024-04-02 PROCEDURE — 2580000003 HC RX 258: Performed by: EMERGENCY MEDICINE

## 2024-04-02 PROCEDURE — 6370000000 HC RX 637 (ALT 250 FOR IP): Performed by: EMERGENCY MEDICINE

## 2024-04-02 PROCEDURE — 74177 CT ABD & PELVIS W/CONTRAST: CPT

## 2024-04-02 PROCEDURE — 81001 URINALYSIS AUTO W/SCOPE: CPT

## 2024-04-02 PROCEDURE — 83735 ASSAY OF MAGNESIUM: CPT

## 2024-04-02 PROCEDURE — 85025 COMPLETE CBC W/AUTO DIFF WBC: CPT

## 2024-04-02 PROCEDURE — 96374 THER/PROPH/DIAG INJ IV PUSH: CPT

## 2024-04-02 PROCEDURE — 83690 ASSAY OF LIPASE: CPT

## 2024-04-02 PROCEDURE — 81025 URINE PREGNANCY TEST: CPT

## 2024-04-02 PROCEDURE — 36415 COLL VENOUS BLD VENIPUNCTURE: CPT

## 2024-04-02 RX ORDER — CEPHALEXIN 500 MG/1
500 CAPSULE ORAL 3 TIMES DAILY
Qty: 30 CAPSULE | Refills: 0 | Status: SHIPPED | OUTPATIENT
Start: 2024-04-02 | End: 2024-04-12

## 2024-04-02 RX ORDER — METOCLOPRAMIDE HYDROCHLORIDE 5 MG/ML
10 INJECTION INTRAMUSCULAR; INTRAVENOUS ONCE
Status: COMPLETED | OUTPATIENT
Start: 2024-04-02 | End: 2024-04-02

## 2024-04-02 RX ORDER — 0.9 % SODIUM CHLORIDE 0.9 %
100 INTRAVENOUS SOLUTION INTRAVENOUS ONCE
Status: COMPLETED | OUTPATIENT
Start: 2024-04-02 | End: 2024-04-02

## 2024-04-02 RX ORDER — ONDANSETRON 4 MG/1
4 TABLET, ORALLY DISINTEGRATING ORAL 3 TIMES DAILY PRN
Qty: 21 TABLET | Refills: 0 | Status: SHIPPED | OUTPATIENT
Start: 2024-04-02

## 2024-04-02 RX ORDER — SODIUM CHLORIDE 0.9 % (FLUSH) 0.9 %
10 SYRINGE (ML) INJECTION PRN
Status: DISCONTINUED | OUTPATIENT
Start: 2024-04-02 | End: 2024-04-02 | Stop reason: HOSPADM

## 2024-04-02 RX ORDER — ONDANSETRON 2 MG/ML
4 INJECTION INTRAMUSCULAR; INTRAVENOUS ONCE
Status: COMPLETED | OUTPATIENT
Start: 2024-04-02 | End: 2024-04-02

## 2024-04-02 RX ORDER — MORPHINE SULFATE 4 MG/ML
4 INJECTION, SOLUTION INTRAMUSCULAR; INTRAVENOUS
Status: COMPLETED | OUTPATIENT
Start: 2024-04-02 | End: 2024-04-02

## 2024-04-02 RX ORDER — POTASSIUM CHLORIDE 20 MEQ/1
40 TABLET, EXTENDED RELEASE ORAL ONCE
Status: COMPLETED | OUTPATIENT
Start: 2024-04-02 | End: 2024-04-02

## 2024-04-02 RX ORDER — 0.9 % SODIUM CHLORIDE 0.9 %
1000 INTRAVENOUS SOLUTION INTRAVENOUS ONCE
Status: COMPLETED | OUTPATIENT
Start: 2024-04-02 | End: 2024-04-02

## 2024-04-02 RX ORDER — DIPHENHYDRAMINE HYDROCHLORIDE 50 MG/ML
25 INJECTION INTRAMUSCULAR; INTRAVENOUS ONCE
Status: COMPLETED | OUTPATIENT
Start: 2024-04-02 | End: 2024-04-02

## 2024-04-02 RX ADMIN — POTASSIUM CHLORIDE 40 MEQ: 1500 TABLET, EXTENDED RELEASE ORAL at 17:48

## 2024-04-02 RX ADMIN — METOCLOPRAMIDE 10 MG: 5 INJECTION, SOLUTION INTRAMUSCULAR; INTRAVENOUS at 18:28

## 2024-04-02 RX ADMIN — SODIUM CHLORIDE 100 ML: 9 INJECTION, SOLUTION INTRAVENOUS at 17:45

## 2024-04-02 RX ADMIN — MORPHINE SULFATE 4 MG: 4 INJECTION, SOLUTION INTRAMUSCULAR; INTRAVENOUS at 16:44

## 2024-04-02 RX ADMIN — ONDANSETRON 4 MG: 2 INJECTION INTRAMUSCULAR; INTRAVENOUS at 16:44

## 2024-04-02 RX ADMIN — SODIUM CHLORIDE 1000 ML: 9 INJECTION, SOLUTION INTRAVENOUS at 16:41

## 2024-04-02 RX ADMIN — IOPAMIDOL 75 ML: 755 INJECTION, SOLUTION INTRAVENOUS at 17:45

## 2024-04-02 RX ADMIN — DIPHENHYDRAMINE HYDROCHLORIDE 25 MG: 50 INJECTION, SOLUTION INTRAMUSCULAR; INTRAVENOUS at 18:26

## 2024-04-02 RX ADMIN — SODIUM CHLORIDE, PRESERVATIVE FREE 10 ML: 5 INJECTION INTRAVENOUS at 17:45

## 2024-04-02 ASSESSMENT — PAIN SCALES - GENERAL
PAINLEVEL_OUTOF10: 8
PAINLEVEL_OUTOF10: 9

## 2024-04-02 ASSESSMENT — ENCOUNTER SYMPTOMS
COLOR CHANGE: 0
EYE PAIN: 0
BACK PAIN: 0
ABDOMINAL PAIN: 1
SHORTNESS OF BREATH: 0
NAUSEA: 1

## 2024-04-02 ASSESSMENT — PAIN DESCRIPTION - LOCATION: LOCATION: ABDOMEN;CHEST;THROAT

## 2024-04-02 ASSESSMENT — LIFESTYLE VARIABLES
HOW OFTEN DO YOU HAVE A DRINK CONTAINING ALCOHOL: NEVER
HOW MANY STANDARD DRINKS CONTAINING ALCOHOL DO YOU HAVE ON A TYPICAL DAY: PATIENT DOES NOT DRINK

## 2024-04-02 NOTE — ED PROVIDER NOTES
EMERGENCY DEPARTMENT ENCOUNTER    Pt Name: Lisy Colin  MRN: [id number]  Birthdate 2000  Date of evaluation: 4/2/24  CHIEF COMPLAINT       Chief Complaint   Patient presents with    Abdominal Pain    Emesis     HISTORY OF PRESENT ILLNESS   22-year-old female complains of abdominal pain, nausea, vomiting.  Reports she been having symptoms for the last 2 days.  States that she has not been able to keep anything down and also reports she been having abdominal pain described as aching pain in the lower abdomen denies any making it better or worse.  She denies any changes in bowel movements, denies any difficulty urinating or pain with urination denies any vaginal bleeding or discharge.  She denies any concern for STIs or pregnancy.  She denies any sick contacts or suspicious food intake, denies any alcohol or marijuana use    The history is provided by the patient.           REVIEW OF SYSTEMS     Review of Systems   Constitutional:  Negative for fever.   HENT:  Negative for congestion and ear pain.    Eyes:  Negative for pain.   Respiratory:  Negative for shortness of breath.    Cardiovascular:  Negative for chest pain, palpitations and leg swelling.   Gastrointestinal:  Positive for abdominal pain and nausea.   Genitourinary:  Negative for dysuria and flank pain.   Musculoskeletal:  Negative for back pain.   Skin:  Negative for color change.   Neurological:  Negative for numbness and headaches.   Psychiatric/Behavioral:  Negative for confusion.    All other systems reviewed and are negative.    PASTMEDICAL HISTORY     Past Medical History:   Diagnosis Date    ADHD (attention deficit hyperactivity disorder)     Anxiety     Depression     Schizo-affective schizophrenia (HCC)      Past Problem List  Patient Active Problem List   Diagnosis Code    Moderate single current episode of major depressive disorder (HCC) F32.1    Generalized anxiety disorder F41.1    Encounter for surveillance of injectable contraceptive

## 2024-04-02 NOTE — ED TRIAGE NOTES
Mode of arrival (squad #, walk in, police, etc) : walk-in        Chief complaint(s): abdominal pain, emesis        Arrival Note (brief scenario, treatment PTA, etc).: Pt reports above symptoms x 2 days.         C= \"Have you ever felt that you should Cut down on your drinking?\"  No  A= \"Have people Annoyed you by criticizing your drinking?\"  No  G= \"Have you ever felt bad or Guilty about your drinking?\"  No  E= \"Have you ever had a drink as an Eye-opener first thing in the morning to steady your nerves or to help a hangover?\"  No      Deferred []      Reason for deferring: N/A    *If yes to two or more: probable alcohol abuse.*

## 2024-04-02 NOTE — ED NOTES
Report given to KEITH Enriquez from ED.   Report method in person   The following was reviewed with receiving RN:   Current vital signs:  BP (!) 146/81   Pulse 54   Temp 98.2 °F (36.8 °C) (Oral)   Resp 22   Ht 1.575 m (5' 2\")   Wt 63.5 kg (140 lb)   SpO2 98%   BMI 25.61 kg/m²                MEWS Score: 1     Any medication or safety alerts were reviewed. Any pending diagnostics and notifications were also reviewed, as well as any safety concerns or issues, abnormal labs, abnormal imaging, and abnormal assessment findings. Questions were answered.

## 2024-04-03 ENCOUNTER — TELEPHONE (OUTPATIENT)
Dept: FAMILY MEDICINE CLINIC | Age: 24
End: 2024-04-03

## 2024-04-03 NOTE — TELEPHONE ENCOUNTER
[facility] ED Follow up Call    Reason for ED visit:  Acute cystitis without hematuria      4/3/2024     Hi Lisy , this is ben from [facility] office, just calling to see how you are doing after your recent ED visit.    Patients phone was unable to accept calls at this time. Unable to leave voicemail.         FU appts/Provider:    No future appointments.      VOICEMAIL DOCUMENTATION - ERASE IF NOT USED  Hi, this message is for Lisy.  This is Ben Hernandez MA from [facility].  Just calling to see how you are doing after your recent visit to the Emergency Room.  [facility] wants to make sure you were able to fill any prescriptions and that you understand your discharge instructions.  Please return our call if you need to make a follow up appointment with your provider or have any further needs.   Our phone number is [phone].  Have a great day.

## 2024-04-04 NOTE — TELEPHONE ENCOUNTER
[facility] ED Follow up Call    Reason for ED visit:  Patients phone was unable to accept calls at this time. Unable to leave voicemail.       4/4/2024     Patients phone was unable to accept calls at this time. Unable to leave voicemail.          FU appts/Provider:    No future appointments.      VOICEMAIL DOCUMENTATION - ERASE IF NOT USED  Hi, this message is for Lisy.  This is Alisson Hernandez MA from [facility].  Just calling to see how you are doing after your recent visit to the Emergency Room.  [facility] wants to make sure you were able to fill any prescriptions and that you understand your discharge instructions.  Please return our call if you need to make a follow up appointment with your provider or have any further needs.   Our phone number is [phone].  Have a great day.

## 2024-04-05 NOTE — TELEPHONE ENCOUNTER
[facility] ED Follow up Call      4/5/2024     Josiah Wasserman , this is ben from [facility] office, just calling to see how you are doing after your recent ED visit.    Patients phone was unable to accept calls at this time. Unable to leave voicemail.    Third attempt    FU appts/Provider:    No future appointments.      VOICEMAIL DOCUMENTATION - ERASE IF NOT USED  Hi, this message is for Lisy.  This is Ben Hernandez MA from [facility].  Just calling to see how you are doing after your recent visit to the Emergency Room.  [facility] wants to make sure you were able to fill any prescriptions and that you understand your discharge instructions.  Please return our call if you need to make a follow up appointment with your provider or have any further needs.   Our phone number is [phone].  Have a great day.

## 2024-08-06 ENCOUNTER — HOSPITAL ENCOUNTER (INPATIENT)
Age: 24
LOS: 7 days | Discharge: HOME | DRG: 750 | End: 2024-08-13
Attending: EMERGENCY MEDICINE | Admitting: PSYCHIATRY & NEUROLOGY
Payer: COMMERCIAL

## 2024-08-06 DIAGNOSIS — F23 ACUTE PSYCHOSIS (HCC): ICD-10-CM

## 2024-08-06 DIAGNOSIS — F30.9 MANIA (HCC): Primary | ICD-10-CM

## 2024-08-06 DIAGNOSIS — F19.90 POLYSUBSTANCE USE DISORDER: ICD-10-CM

## 2024-08-06 LAB
ALBUMIN SERPL-MCNC: 4.2 G/DL (ref 3.5–5.2)
ALP SERPL-CCNC: 72 U/L (ref 35–104)
ALT SERPL-CCNC: 8 U/L (ref 5–33)
AMPHET UR QL SCN: NEGATIVE
ANION GAP SERPL CALCULATED.3IONS-SCNC: 12 MMOL/L (ref 9–17)
APAP SERPL-MCNC: <5 UG/ML (ref 10–30)
AST SERPL-CCNC: 11 U/L
BARBITURATES UR QL SCN: NEGATIVE
BENZODIAZ UR QL: NEGATIVE
BILIRUB SERPL-MCNC: 0.5 MG/DL (ref 0.3–1.2)
BUN SERPL-MCNC: 16 MG/DL (ref 6–20)
CALCIUM SERPL-MCNC: 8.8 MG/DL (ref 8.6–10.4)
CANNABINOIDS UR QL SCN: POSITIVE
CHLORIDE SERPL-SCNC: 104 MMOL/L (ref 98–107)
CO2 SERPL-SCNC: 20 MMOL/L (ref 20–31)
COCAINE UR QL SCN: POSITIVE
CREAT SERPL-MCNC: 0.8 MG/DL (ref 0.5–0.9)
ERYTHROCYTE [DISTWIDTH] IN BLOOD BY AUTOMATED COUNT: 13.2 % (ref 11.5–14.9)
ETHANOL PERCENT: <0.01 %
ETHANOLAMINE SERPL-MCNC: <10 MG/DL (ref 0–0.08)
FENTANYL UR QL: POSITIVE
GFR, ESTIMATED: >90 ML/MIN/1.73M2
GLUCOSE SERPL-MCNC: 88 MG/DL (ref 70–99)
HCG UR QL: NEGATIVE
HCT VFR BLD AUTO: 40.4 % (ref 36–46)
HGB BLD-MCNC: 13.4 G/DL (ref 12–16)
MCH RBC QN AUTO: 30.2 PG (ref 26–34)
MCHC RBC AUTO-ENTMCNC: 33.3 G/DL (ref 31–37)
MCV RBC AUTO: 90.9 FL (ref 80–100)
METHADONE UR QL: NEGATIVE
OPIATES UR QL SCN: POSITIVE
OXYCODONE UR QL SCN: NEGATIVE
PCP UR QL SCN: NEGATIVE
PLATELET # BLD AUTO: 279 K/UL (ref 150–450)
PMV BLD AUTO: 8.1 FL (ref 6–12)
POTASSIUM SERPL-SCNC: 4 MMOL/L (ref 3.7–5.3)
PROT SERPL-MCNC: 7.1 G/DL (ref 6.4–8.3)
RBC # BLD AUTO: 4.44 M/UL (ref 4–5.2)
SALICYLATES SERPL-MCNC: <1 MG/DL (ref 3–10)
SODIUM SERPL-SCNC: 136 MMOL/L (ref 135–144)
TEST INFORMATION: ABNORMAL
TRICYCLIC ANTIDEPRESSANTS, UR: NEGATIVE
TSH SERPL DL<=0.05 MIU/L-ACNC: 1.25 UIU/ML (ref 0.3–5)
WBC OTHER # BLD: 8.9 K/UL (ref 3.5–11)

## 2024-08-06 PROCEDURE — 99285 EMERGENCY DEPT VISIT HI MDM: CPT

## 2024-08-06 PROCEDURE — 80179 DRUG ASSAY SALICYLATE: CPT

## 2024-08-06 PROCEDURE — 80061 LIPID PANEL: CPT

## 2024-08-06 PROCEDURE — G0480 DRUG TEST DEF 1-7 CLASSES: HCPCS

## 2024-08-06 PROCEDURE — 84443 ASSAY THYROID STIM HORMONE: CPT

## 2024-08-06 PROCEDURE — 80143 DRUG ASSAY ACETAMINOPHEN: CPT

## 2024-08-06 PROCEDURE — 1240000000 HC EMOTIONAL WELLNESS R&B

## 2024-08-06 PROCEDURE — 81025 URINE PREGNANCY TEST: CPT

## 2024-08-06 PROCEDURE — 80053 COMPREHEN METABOLIC PANEL: CPT

## 2024-08-06 PROCEDURE — 80307 DRUG TEST PRSMV CHEM ANLYZR: CPT

## 2024-08-06 PROCEDURE — 85027 COMPLETE CBC AUTOMATED: CPT

## 2024-08-06 PROCEDURE — 83036 HEMOGLOBIN GLYCOSYLATED A1C: CPT

## 2024-08-06 PROCEDURE — 36415 COLL VENOUS BLD VENIPUNCTURE: CPT

## 2024-08-06 ASSESSMENT — PAIN - FUNCTIONAL ASSESSMENT: PAIN_FUNCTIONAL_ASSESSMENT: NONE - DENIES PAIN

## 2024-08-07 PROBLEM — F19.90 POLYSUBSTANCE USE DISORDER: Status: ACTIVE | Noted: 2024-08-07

## 2024-08-07 PROBLEM — F30.9 MANIA (HCC): Status: ACTIVE | Noted: 2024-08-07

## 2024-08-07 PROCEDURE — 1240000000 HC EMOTIONAL WELLNESS R&B

## 2024-08-07 PROCEDURE — 6370000000 HC RX 637 (ALT 250 FOR IP): Performed by: NURSE PRACTITIONER

## 2024-08-07 PROCEDURE — 6370000000 HC RX 637 (ALT 250 FOR IP): Performed by: PSYCHIATRY & NEUROLOGY

## 2024-08-07 PROCEDURE — 99222 1ST HOSP IP/OBS MODERATE 55: CPT | Performed by: INTERNAL MEDICINE

## 2024-08-07 PROCEDURE — APPSS60 APP SPLIT SHARED TIME 46-60 MINUTES: Performed by: NURSE PRACTITIONER

## 2024-08-07 PROCEDURE — 6360000002 HC RX W HCPCS: Performed by: EMERGENCY MEDICINE

## 2024-08-07 PROCEDURE — 99222 1ST HOSP IP/OBS MODERATE 55: CPT | Performed by: PSYCHIATRY & NEUROLOGY

## 2024-08-07 RX ORDER — HALOPERIDOL 5 MG/ML
5 INJECTION INTRAMUSCULAR ONCE
Status: COMPLETED | OUTPATIENT
Start: 2024-08-07 | End: 2024-08-07

## 2024-08-07 RX ORDER — POLYETHYLENE GLYCOL 3350 17 G/17G
17 POWDER, FOR SOLUTION ORAL DAILY PRN
Status: DISCONTINUED | OUTPATIENT
Start: 2024-08-07 | End: 2024-08-13 | Stop reason: HOSPADM

## 2024-08-07 RX ORDER — TRAZODONE HYDROCHLORIDE 50 MG/1
50 TABLET ORAL NIGHTLY PRN
Status: DISCONTINUED | OUTPATIENT
Start: 2024-08-07 | End: 2024-08-13 | Stop reason: HOSPADM

## 2024-08-07 RX ORDER — POLYETHYLENE GLYCOL 3350 17 G
2 POWDER IN PACKET (EA) ORAL
Status: DISCONTINUED | OUTPATIENT
Start: 2024-08-07 | End: 2024-08-07

## 2024-08-07 RX ORDER — DIPHENHYDRAMINE HYDROCHLORIDE 50 MG/ML
50 INJECTION INTRAMUSCULAR; INTRAVENOUS EVERY 6 HOURS PRN
Status: DISCONTINUED | OUTPATIENT
Start: 2024-08-07 | End: 2024-08-13 | Stop reason: HOSPADM

## 2024-08-07 RX ORDER — MAGNESIUM HYDROXIDE/ALUMINUM HYDROXICE/SIMETHICONE 120; 1200; 1200 MG/30ML; MG/30ML; MG/30ML
30 SUSPENSION ORAL EVERY 6 HOURS PRN
Status: DISCONTINUED | OUTPATIENT
Start: 2024-08-07 | End: 2024-08-13 | Stop reason: HOSPADM

## 2024-08-07 RX ORDER — LORAZEPAM 1 MG/1
2 TABLET ORAL EVERY 6 HOURS PRN
Status: DISCONTINUED | OUTPATIENT
Start: 2024-08-07 | End: 2024-08-13 | Stop reason: HOSPADM

## 2024-08-07 RX ORDER — HALOPERIDOL 5 MG/ML
5 INJECTION INTRAMUSCULAR EVERY 6 HOURS PRN
Status: DISCONTINUED | OUTPATIENT
Start: 2024-08-07 | End: 2024-08-13 | Stop reason: HOSPADM

## 2024-08-07 RX ORDER — CLONIDINE HYDROCHLORIDE 0.1 MG/1
0.1 TABLET ORAL EVERY 4 HOURS PRN
Status: DISCONTINUED | OUTPATIENT
Start: 2024-08-07 | End: 2024-08-13 | Stop reason: HOSPADM

## 2024-08-07 RX ORDER — BUSPIRONE HYDROCHLORIDE 10 MG/1
10 TABLET ORAL 3 TIMES DAILY
Status: ON HOLD | COMMUNITY
End: 2024-08-07

## 2024-08-07 RX ORDER — POLYETHYLENE GLYCOL 3350 17 G
2 POWDER IN PACKET (EA) ORAL
Status: DISCONTINUED | OUTPATIENT
Start: 2024-08-07 | End: 2024-08-08

## 2024-08-07 RX ORDER — ONDANSETRON 4 MG/1
4 TABLET, FILM COATED ORAL EVERY 8 HOURS PRN
Status: DISCONTINUED | OUTPATIENT
Start: 2024-08-07 | End: 2024-08-09

## 2024-08-07 RX ORDER — DICYCLOMINE HYDROCHLORIDE 10 MG/1
20 CAPSULE ORAL 4 TIMES DAILY PRN
Status: DISCONTINUED | OUTPATIENT
Start: 2024-08-07 | End: 2024-08-13 | Stop reason: HOSPADM

## 2024-08-07 RX ORDER — LORAZEPAM 2 MG/ML
2 INJECTION INTRAMUSCULAR EVERY 6 HOURS PRN
Status: DISCONTINUED | OUTPATIENT
Start: 2024-08-07 | End: 2024-08-13 | Stop reason: HOSPADM

## 2024-08-07 RX ORDER — CYCLOBENZAPRINE HCL 10 MG
10 TABLET ORAL 3 TIMES DAILY PRN
Status: DISCONTINUED | OUTPATIENT
Start: 2024-08-07 | End: 2024-08-13 | Stop reason: HOSPADM

## 2024-08-07 RX ORDER — ARIPIPRAZOLE 15 MG/1
15 TABLET ORAL DAILY
Status: DISCONTINUED | OUTPATIENT
Start: 2024-08-07 | End: 2024-08-09

## 2024-08-07 RX ORDER — ACETAMINOPHEN 325 MG/1
650 TABLET ORAL EVERY 6 HOURS PRN
Status: DISCONTINUED | OUTPATIENT
Start: 2024-08-07 | End: 2024-08-13 | Stop reason: HOSPADM

## 2024-08-07 RX ORDER — IBUPROFEN 400 MG/1
400 TABLET ORAL EVERY 6 HOURS PRN
Status: DISCONTINUED | OUTPATIENT
Start: 2024-08-07 | End: 2024-08-13 | Stop reason: HOSPADM

## 2024-08-07 RX ORDER — HYDROXYZINE 50 MG/1
50 TABLET, FILM COATED ORAL 3 TIMES DAILY PRN
Status: DISCONTINUED | OUTPATIENT
Start: 2024-08-07 | End: 2024-08-13 | Stop reason: HOSPADM

## 2024-08-07 RX ORDER — HALOPERIDOL 5 MG/1
5 TABLET ORAL EVERY 6 HOURS PRN
Status: DISCONTINUED | OUTPATIENT
Start: 2024-08-07 | End: 2024-08-13 | Stop reason: HOSPADM

## 2024-08-07 RX ADMIN — NICOTINE POLACRILEX 2 MG: 2 LOZENGE ORAL at 19:52

## 2024-08-07 RX ADMIN — IBUPROFEN 400 MG: 400 TABLET, FILM COATED ORAL at 20:55

## 2024-08-07 RX ADMIN — TRAZODONE HYDROCHLORIDE 50 MG: 50 TABLET ORAL at 20:47

## 2024-08-07 RX ADMIN — HYDROXYZINE HYDROCHLORIDE 50 MG: 50 TABLET, FILM COATED ORAL at 20:47

## 2024-08-07 RX ADMIN — CLONIDINE HYDROCHLORIDE 0.1 MG: 0.1 TABLET ORAL at 19:18

## 2024-08-07 RX ADMIN — ACETAMINOPHEN 650 MG: 325 TABLET ORAL at 19:55

## 2024-08-07 RX ADMIN — NICOTINE POLACRILEX 2 MG: 2 LOZENGE ORAL at 18:37

## 2024-08-07 RX ADMIN — HALOPERIDOL LACTATE 5 MG: 5 INJECTION, SOLUTION INTRAMUSCULAR at 00:22

## 2024-08-07 RX ADMIN — LORAZEPAM 2 MG: 1 TABLET ORAL at 21:27

## 2024-08-07 RX ADMIN — ARIPIPRAZOLE 15 MG: 15 TABLET ORAL at 11:35

## 2024-08-07 RX ADMIN — NICOTINE POLACRILEX 2 MG: 2 LOZENGE ORAL at 17:37

## 2024-08-07 RX ADMIN — NICOTINE POLACRILEX 2 MG: 2 LOZENGE ORAL at 11:34

## 2024-08-07 RX ADMIN — NICOTINE POLACRILEX 2 MG: 2 LOZENGE ORAL at 21:08

## 2024-08-07 RX ADMIN — NICOTINE POLACRILEX 2 MG: 2 LOZENGE ORAL at 13:39

## 2024-08-07 RX ADMIN — HALOPERIDOL 5 MG: 5 TABLET ORAL at 21:27

## 2024-08-07 ASSESSMENT — PAIN DESCRIPTION - FREQUENCY: FREQUENCY: CONTINUOUS

## 2024-08-07 ASSESSMENT — PATIENT HEALTH QUESTIONNAIRE - PHQ9
1. LITTLE INTEREST OR PLEASURE IN DOING THINGS: NOT AT ALL
SUM OF ALL RESPONSES TO PHQ QUESTIONS 1-9: 0
2. FEELING DOWN, DEPRESSED OR HOPELESS: NOT AT ALL
SUM OF ALL RESPONSES TO PHQ QUESTIONS 1-9: 0
SUM OF ALL RESPONSES TO PHQ QUESTIONS 1-9: 0
SUM OF ALL RESPONSES TO PHQ9 QUESTIONS 1 & 2: 0
SUM OF ALL RESPONSES TO PHQ QUESTIONS 1-9: 0

## 2024-08-07 ASSESSMENT — PAIN DESCRIPTION - ONSET: ONSET: ON-GOING

## 2024-08-07 ASSESSMENT — PAIN DESCRIPTION - LOCATION: LOCATION: HEAD

## 2024-08-07 ASSESSMENT — PAIN SCALES - GENERAL
PAINLEVEL_OUTOF10: 6
PAINLEVEL_OUTOF10: 3

## 2024-08-07 ASSESSMENT — SLEEP AND FATIGUE QUESTIONNAIRES
DO YOU HAVE DIFFICULTY SLEEPING: YES
SLEEP PATTERN: DIFFICULTY FALLING ASLEEP;DISTURBED/INTERRUPTED SLEEP
AVERAGE NUMBER OF SLEEP HOURS: 4
DO YOU USE A SLEEP AID: YES

## 2024-08-07 ASSESSMENT — PAIN SCALES - WONG BAKER
WONGBAKER_NUMERICALRESPONSE: HURTS A LITTLE BIT
WONGBAKER_NUMERICALRESPONSE: HURTS A LITTLE BIT

## 2024-08-07 ASSESSMENT — PAIN - FUNCTIONAL ASSESSMENT: PAIN_FUNCTIONAL_ASSESSMENT: ACTIVITIES ARE NOT PREVENTED

## 2024-08-07 ASSESSMENT — LIFESTYLE VARIABLES
HOW OFTEN DO YOU HAVE A DRINK CONTAINING ALCOHOL: NEVER
HOW MANY STANDARD DRINKS CONTAINING ALCOHOL DO YOU HAVE ON A TYPICAL DAY: PATIENT DOES NOT DRINK
HOW OFTEN DO YOU HAVE A DRINK CONTAINING ALCOHOL: NEVER
HOW MANY STANDARD DRINKS CONTAINING ALCOHOL DO YOU HAVE ON A TYPICAL DAY: PATIENT DOES NOT DRINK

## 2024-08-07 ASSESSMENT — PAIN DESCRIPTION - PAIN TYPE: TYPE: ACUTE PAIN
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
## 2024-08-07 NOTE — BH NOTE
[facility]  Admission Note     Admission Type:   Voluntary     Reason for admission:  Reason for Admission: Patient has been expereincing increase in paranoia and stated she is in a \"system where the government and other people are hearing and controling her thoughts\". Patient has a history of schizophrenia. Patient is positive for Fentanyl, cocaine, & opiates. Patient reports compliance with medications, however, states they are not working.      Addictive Behavior:   Addictive Behavior  In the Past 3 Months, Have You Felt or Has Someone Told You That You Have a Problem With  : None    Medical Problems:   Past Medical History:   Diagnosis Date    ADHD (attention deficit hyperactivity disorder)     Anxiety     Depression     Schizo-affective schizophrenia (HCC)        Status EXAM:  Mental Status and Behavioral Exam  Normal: No  Level of Assistance: Independent/Self  Facial Expression: Worried  Affect: Unstable  Level of Consciousness: Alert  Frequency of Checks: 4 times per hour, close  Mood:Normal: No  Mood: Anxious, Suspicious, Labile  Motor Activity:Normal: Yes  Eye Contact: Fair  Observed Behavior: Preoccupied, Tearful  Sexual Misconduct History: Current - no  Preception: Pearl to person, Pearl to time, Pearl to place, Pearl to situation  Attention:Normal: No  Attention: Distractible  Thought Processes: Unremarkable  Thought Content:Normal: No  Thought Content: Delusions, Paranoia, Preoccupations  Depression Symptoms: Impaired concentration  Anxiety Symptoms: Generalized  Linda Symptoms: Labile, Poor judgment  Hallucinations: None  Delusions: Yes  Delusions: Paranoid  Memory:Normal: No  Memory: Poor recent  Insight and Judgment: No  Insight and Judgment: Poor judgment, Poor insight    Tobacco Screening:  Practical Counseling, on admission, jonatan X, if applicable and completed (first 3 are required if patient doesn't refuse):            ( ) Recognizing danger situations (included triggers and

## 2024-08-07 NOTE — H&P
IN-PATIENT SERVICE  [facility] Internal Medicine  [facility] Internal Medicine   Mushtaq Mason MD; Evangelist Saldana MD; Moisés Zuluaga MD; Chun Robbins MD,   Rosalia Warner MD; Chaparro Banks MD; Jagruti Leahy MD; Bridger Zaragoza MD; Darren Astudillo MD    HISTORY AND PHYSICAL EXAMINATION/ CONSULT NOTE            Date:   8/7/2024  Patient name:  Lisy Colin  Date of admission:  8/6/2024 10:15 PM  MRN:   [id number]  Account:  [id number]  YOB: 2000  PCP:    Manish Lovell APRN - CNP  Room:   [address] [place] [address]  Code Status:    Prior    Physician Requesting Consult: James Pena MD    Reason for Consult: History and physical, medical management    Chief Complaint:     Chief Complaint   Patient presents with    Mental Health Problem     Medical management    History Obtained From:     Patient, EMR, nursing staff    History of Present Illness:     25-year-old female admitted for symptoms of page    No significant medical history  Patient denies any chest pain palpitation cough difficulty breathing nausea vomiting diarrhea or urinary symptoms      Past Medical History:     Past Medical History:   Diagnosis Date    ADHD (attention deficit hyperactivity disorder)     Anxiety     Depression     Schizo-affective schizophrenia (HCC)         Past Surgical History:     Past Surgical History:   Procedure Laterality Date    ADENOIDECTOMY      FOOT FRACTURE SURGERY Bilateral 12/16/2021    LEFT CALCANEOUS OPEN REDUCTION INTERNAL FIXATION AND RIGHT FOOT FRACTURES OPEN REDUCTION INTERNAL FIXATION performed by Abilio Watts MD at [facility] OR    FOOT SURGERY Left 5/31/2023    Left foot removal of hardware performed by Abilio Watts MD at [facility] OR    TONSILLECTOMY  2003        Medications Prior to Admission:     Prior to Admission medications    Medication Sig Start Date End Date Taking? Authorizing Provider   ondansetron (ZOFRAN-ODT) 4 MG disintegrating tablet Take 1 tablet by mouth 3 
urgency.   Musculoskeletal: Negative for falls and joint pain.   Skin: Negative for itching and rash.   Neurological: Negative for tremors, seizures and weakness.   Endo/Heme/Allergies: Does not bruise/bleed easily.      Mental Status Examination:    Level of consciousness: Awake and alert  Appearance:  Appropriate attire, resting in bed, fair grooming   Behavior/Motor: Approachable, engages with interviewer, calm  Attitude toward examiner: Mostly cooperative, inattentive, poor eye contact  Speech: Decreased rate and volume, monotone, limited output  Mood: Constricted  Affect: Flat  Thought processes: Slow, linear, coherent  Thought content: Denies suicidal ideations              Denies homicidal ideations               Endorses auditory hallucinations              Endorses delusions              Exhibits paranoia  Cognition:  Oriented to self, location, time, situation  Concentration: Impaired  Memory: Intact  Insight & Judgment: Poor         DSM-5 Diagnosis  Principal Problem: Schizoaffective disorder, depressive type (HCC)  Polysubstance use disorder (fentanyl, crack cocaine, cannabis, opioids)    Rule out substance-induced psychosis    History of BPD traits, anxiety with panic attacks, ADHD    Psychosocial and Contextual factors:  Financial   Occupational   Relationship   Legal   Living situation   Educational     Past Medical History:   Diagnosis Date    ADHD (attention deficit hyperactivity disorder)     Anxiety     Depression     Schizo-affective schizophrenia (HCC)         PATIENT HANDOFF:  Home medications reviewed.   Medication management per attending  Monitor need and frequency of PRN medications.    CONSULT:  [x] Yes [] No  Internal medicine for medical management/medical H&P      Risk Management: close watch per standard protocol      Psychotherapy: participation in milieu and group and individual sessions with Attending Physician,  and Physician Assistant/CNP      Estimated length of

## 2024-08-07 NOTE — GROUP NOTE
Group Therapy Note    Date: 8/7/2024    Group Start Time: 1100  Group End Time: 1200  Group Topic: Cognitive Skills    Oralia Noriega CTRS        Group Therapy Note    Attendees: 5/10     Patient's Goal:  To increase social interaction, self expression, exploring self esteem boosters and busters   and benefits of healthy expression of feelings.      Notes: Pt was pleasant and cooperative and was attentive to discussion, did 2 brief drawings, and was attentive to   participated fully. Pt was able to practice brief self expression, and was attentive to peers sharing r/t self esteem   boosters and busters,and benefits of healthy expression of feelings.         Status After Intervention:  Improved     Participation Level: Attentive, engaged in  conversation , pleasant on approach     Participation Quality: Appropriate ,Active Listener ,interactive in discussion      Speech:  Normal responses to superficial comments from peers but did not share r/t topic.       Thought Process/Content: Logical  r/t group topic/art work, impaired concentration, thought blocking         Affective Functioning : Blunted       Mood: Cooperative, calm, verbally guarded but appeared at ease around female peers in group.          Level of consciousness:  Alert,  and Attentive       Response to Learning: Able to demonstrate some current knowledge/experience ,   Able to acknowledge new learning , and Progressing to goal      Endings: None Reported      Modes of Intervention: Education, Support, Socialization, and Problem-solving    Discipline Responsible: Psychoeducational Specialist      Signature:  ROLA MICHAUD

## 2024-08-07 NOTE — ED NOTES
Pt abruptly gets up from chair in PPU and started punching self in the head. Pt shouting \"I can't take this. I can't get them to stop.\" This writer intervened and provided de-escalation techniques to patient consisting of empathetic and non-judgmental communication. Pt is redirectable. Pt is willing to take PRN medication.     ED  informed of pt's behaviors.

## 2024-08-07 NOTE — GROUP NOTE
Group Therapy Note    Date: 8/7/2024    Group Start Time: 1335  Group End Time: 1420  Group Topic: Cognitive Skills    Oralia Noriega CTRS        Group Therapy Note    Attendees: 3/10     Topic:  To increase social interaction, self expression, exploring self care/self prioritizing   \"Can't pour from an empty cup\" concept, and setting boundaries with others .      Comments:    Patient did not participate in Cognitive Skills Group at 1335, despite staff encouragement   and explanation of benefits.  Patient remains seclusive to self and room.    Q15 minute safety checks maintained for patient safety and will continue to encourage   patient to attend unit programming.       Signature:  ROLA Nelson

## 2024-08-08 LAB
CHOLEST SERPL-MCNC: 131 MG/DL
CHOLESTEROL/HDL RATIO: 3.2
EST. AVERAGE GLUCOSE BLD GHB EST-MCNC: 94 MG/DL
HBA1C MFR BLD: 4.9 % (ref 4–6)
HDLC SERPL-MCNC: 41 MG/DL
LDLC SERPL CALC-MCNC: 68 MG/DL (ref 0–130)
TRIGL SERPL-MCNC: 109 MG/DL

## 2024-08-08 PROCEDURE — 6370000000 HC RX 637 (ALT 250 FOR IP): Performed by: PSYCHIATRY & NEUROLOGY

## 2024-08-08 PROCEDURE — 99232 SBSQ HOSP IP/OBS MODERATE 35: CPT | Performed by: PSYCHIATRY & NEUROLOGY

## 2024-08-08 PROCEDURE — 6370000000 HC RX 637 (ALT 250 FOR IP): Performed by: NURSE PRACTITIONER

## 2024-08-08 PROCEDURE — 6370000000 HC RX 637 (ALT 250 FOR IP)

## 2024-08-08 PROCEDURE — 99231 SBSQ HOSP IP/OBS SF/LOW 25: CPT | Performed by: INTERNAL MEDICINE

## 2024-08-08 PROCEDURE — 1240000000 HC EMOTIONAL WELLNESS R&B

## 2024-08-08 PROCEDURE — APPSS30 APP SPLIT SHARED TIME 16-30 MINUTES

## 2024-08-08 RX ADMIN — NICOTINE POLACRILEX 2 MG: 2 LOZENGE ORAL at 12:40

## 2024-08-08 RX ADMIN — HYDROXYZINE HYDROCHLORIDE 50 MG: 50 TABLET, FILM COATED ORAL at 21:59

## 2024-08-08 RX ADMIN — NICOTINE POLACRILEX 2 MG: 2 LOZENGE ORAL at 16:19

## 2024-08-08 RX ADMIN — NICOTINE POLACRILEX 4 MG: 4 LOZENGE ORAL at 18:25

## 2024-08-08 RX ADMIN — TRAZODONE HYDROCHLORIDE 50 MG: 50 TABLET ORAL at 21:59

## 2024-08-08 RX ADMIN — NICOTINE POLACRILEX 4 MG: 4 LOZENGE ORAL at 22:49

## 2024-08-08 RX ADMIN — HYDROXYZINE HYDROCHLORIDE 50 MG: 50 TABLET, FILM COATED ORAL at 06:09

## 2024-08-08 RX ADMIN — ARIPIPRAZOLE 15 MG: 15 TABLET ORAL at 09:56

## 2024-08-08 RX ADMIN — NICOTINE POLACRILEX 4 MG: 4 LOZENGE ORAL at 19:35

## 2024-08-08 RX ADMIN — NICOTINE POLACRILEX 2 MG: 2 LOZENGE ORAL at 15:15

## 2024-08-08 ASSESSMENT — LIFESTYLE VARIABLES
HOW MANY STANDARD DRINKS CONTAINING ALCOHOL DO YOU HAVE ON A TYPICAL DAY: PATIENT DOES NOT DRINK
HOW OFTEN DO YOU HAVE A DRINK CONTAINING ALCOHOL: NEVER

## 2024-08-08 ASSESSMENT — PAIN SCALES - WONG BAKER: WONGBAKER_NUMERICALRESPONSE: NO HURT

## 2024-08-08 NOTE — GROUP NOTE
Group Therapy Note    Date: 8/8/2024    Group Start Time: 1000  Group End Time: 1030  Group Topic: Psychoeducation    Marietta Tejeda MSW, MARTITA        Group Therapy Note    Attendees: 3/11       Patient was offered group therapy today but declined to participate despite encouragement from staff.  1:1 was offered.       Signature:  GANGA Pope, MARTITA

## 2024-08-08 NOTE — GROUP NOTE
Group Therapy Note    Date: 8/8/2024    Group Start Time: 1100  Group End Time: 1155  Group Topic: Cognitive Skills    Oralia Noriega CTRS        Group Therapy Note    Attendees: 4/10     Topic:  To increase social interaction, practice decision making skills, and impulse control    Comments:    Patient did not participate in Cognitive Skills Group at 1100, despite staff encouragement   and explanation of benefits.  Patient remains seclusive to self and room.    Q15 minute safety checks maintained for patient safety and will continue to encourage   patient to attend unit programming.       Signature:  ROLA Nelson

## 2024-08-08 NOTE — GROUP NOTE
Group Therapy Note    Date: 8/8/2024    Group Start Time: 1335  Group End Time: 1425  Group Topic: Cognitive Skills    Oralia Noriega CTRS        Group Therapy Note    Attendees: 4/10     Topic:  To increase social interaction, practice self expression, sharing ideas and preferences, and relating to peers about shared ideas/preferences as well as being respectful of differences.    Comments:    Patient did not participate in Cognitive Skills Group at 1335, despite staff encouragement   and explanation of benefits.  Patient remains seclusive to self and room.    Q15 minute safety checks maintained for patient safety and will continue to encourage   patient to attend unit programming.       Signature:  ROLA Nelson

## 2024-08-08 NOTE — BH NOTE
Emergency PRN Medication Administration Note:      Patient is Agitated as evidence by hitting self with bible, pacing, unstable affect, stating \"my head is killing me, the devils inside me.\". Staff attempted to find and relieve the distress by Talking to patient, Offering suggestions, and Administer PRN medications Patient is currently accepting of prn medications . Medication Administered as prescribed:haldol 5 mg, ativan 2 mg, oral. Patient Tolerated medication administration.   Will continue to monitor, offer support, and reassess.

## 2024-08-09 PROCEDURE — 6370000000 HC RX 637 (ALT 250 FOR IP): Performed by: NURSE PRACTITIONER

## 2024-08-09 PROCEDURE — 6370000000 HC RX 637 (ALT 250 FOR IP): Performed by: PSYCHIATRY & NEUROLOGY

## 2024-08-09 PROCEDURE — 6370000000 HC RX 637 (ALT 250 FOR IP)

## 2024-08-09 PROCEDURE — 99232 SBSQ HOSP IP/OBS MODERATE 35: CPT | Performed by: PSYCHIATRY & NEUROLOGY

## 2024-08-09 PROCEDURE — 1240000000 HC EMOTIONAL WELLNESS R&B

## 2024-08-09 PROCEDURE — APPSS30 APP SPLIT SHARED TIME 16-30 MINUTES: Performed by: NURSE PRACTITIONER

## 2024-08-09 RX ORDER — ARIPIPRAZOLE 20 MG/1
20 TABLET ORAL DAILY
Status: DISCONTINUED | OUTPATIENT
Start: 2024-08-10 | End: 2024-08-13 | Stop reason: HOSPADM

## 2024-08-09 RX ORDER — ONDANSETRON 4 MG/1
4 TABLET, ORALLY DISINTEGRATING ORAL EVERY 8 HOURS PRN
Status: DISCONTINUED | OUTPATIENT
Start: 2024-08-09 | End: 2024-08-13 | Stop reason: HOSPADM

## 2024-08-09 RX ADMIN — IBUPROFEN 400 MG: 400 TABLET, FILM COATED ORAL at 20:08

## 2024-08-09 RX ADMIN — CYCLOBENZAPRINE 10 MG: 10 TABLET, FILM COATED ORAL at 00:52

## 2024-08-09 RX ADMIN — CYCLOBENZAPRINE 10 MG: 10 TABLET, FILM COATED ORAL at 20:03

## 2024-08-09 RX ADMIN — ACETAMINOPHEN 650 MG: 325 TABLET ORAL at 21:02

## 2024-08-09 RX ADMIN — CLONIDINE HYDROCHLORIDE 0.1 MG: 0.1 TABLET ORAL at 21:02

## 2024-08-09 RX ADMIN — NICOTINE POLACRILEX 4 MG: 4 LOZENGE ORAL at 05:53

## 2024-08-09 RX ADMIN — ONDANSETRON 4 MG: 4 TABLET, ORALLY DISINTEGRATING ORAL at 14:38

## 2024-08-09 RX ADMIN — CLONIDINE HYDROCHLORIDE 0.1 MG: 0.1 TABLET ORAL at 12:42

## 2024-08-09 RX ADMIN — DICYCLOMINE HYDROCHLORIDE 20 MG: 10 CAPSULE ORAL at 20:03

## 2024-08-09 RX ADMIN — HYDROXYZINE HYDROCHLORIDE 50 MG: 50 TABLET, FILM COATED ORAL at 00:52

## 2024-08-09 RX ADMIN — ACETAMINOPHEN 650 MG: 325 TABLET ORAL at 09:50

## 2024-08-09 RX ADMIN — CYCLOBENZAPRINE 10 MG: 10 TABLET, FILM COATED ORAL at 12:42

## 2024-08-09 RX ADMIN — ALUMINUM HYDROXIDE, MAGNESIUM HYDROXIDE, AND SIMETHICONE 30 ML: 1200; 120; 1200 SUSPENSION ORAL at 20:48

## 2024-08-09 RX ADMIN — NICOTINE POLACRILEX 4 MG: 4 LOZENGE ORAL at 20:02

## 2024-08-09 RX ADMIN — NICOTINE POLACRILEX 4 MG: 4 LOZENGE ORAL at 17:06

## 2024-08-09 RX ADMIN — ARIPIPRAZOLE 15 MG: 15 TABLET ORAL at 08:12

## 2024-08-09 RX ADMIN — NICOTINE POLACRILEX 4 MG: 4 LOZENGE ORAL at 08:13

## 2024-08-09 RX ADMIN — IBUPROFEN 400 MG: 400 TABLET, FILM COATED ORAL at 05:52

## 2024-08-09 RX ADMIN — DICYCLOMINE HYDROCHLORIDE 20 MG: 10 CAPSULE ORAL at 12:42

## 2024-08-09 RX ADMIN — TRAZODONE HYDROCHLORIDE 50 MG: 50 TABLET ORAL at 21:15

## 2024-08-09 RX ADMIN — IBUPROFEN 400 MG: 400 TABLET, FILM COATED ORAL at 00:52

## 2024-08-09 ASSESSMENT — PAIN SCALES - WONG BAKER: WONGBAKER_NUMERICALRESPONSE: NO HURT

## 2024-08-09 ASSESSMENT — PAIN DESCRIPTION - LOCATION
LOCATION: CHEST
LOCATION: GENERALIZED
LOCATION: HEAD
LOCATION: HEAD
LOCATION: ABDOMEN

## 2024-08-09 ASSESSMENT — PAIN DESCRIPTION - DESCRIPTORS
DESCRIPTORS: ACHING;THROBBING
DESCRIPTORS: ACHING;SHARP
DESCRIPTORS: ACHING
DESCRIPTORS: ACHING
DESCRIPTORS: ACHING;THROBBING
DESCRIPTORS: ACHING;THROBBING
DESCRIPTORS: ACHING

## 2024-08-09 ASSESSMENT — PAIN SCALES - GENERAL
PAINLEVEL_OUTOF10: 9
PAINLEVEL_OUTOF10: 8
PAINLEVEL_OUTOF10: 7
PAINLEVEL_OUTOF10: 8
PAINLEVEL_OUTOF10: 3
PAINLEVEL_OUTOF10: 0

## 2024-08-09 ASSESSMENT — PAIN - FUNCTIONAL ASSESSMENT
PAIN_FUNCTIONAL_ASSESSMENT: ACTIVITIES ARE NOT PREVENTED

## 2024-08-09 ASSESSMENT — PAIN DESCRIPTION - ORIENTATION: ORIENTATION: MID

## 2024-08-09 NOTE — GROUP NOTE
Group Therapy Note    Date: 8/9/2024    Group Start Time: 1100  Group End Time: 1135  Group Topic: Cognitive Skills    Oralia Noriega CTRS        Group Therapy Note    Attendees: 4/11     Topic:  To increase social interaction, practice decision making skills, and communication skills.      Comments:    Patient did not participate in Cognitive Skills Group at 1100, despite staff encouragement   and explanation of benefits.  Patient remains seclusive to self and room.    Q15 minute safety checks maintained for patient safety and will continue to encourage   patient to attend unit programming.       Signature:  ROLA Nelson

## 2024-08-09 NOTE — GROUP NOTE
Group Therapy Note    Date: 8/9/2024    Group Start Time: 1000  Group End Time: 1040  Group Topic: Psychoeducation    Radha Szymanski LISW-S        Group Therapy Note    Attendees: 6/11       Patient was offered group therapy today but declined to participate despite encouragement from staff.  1:1 was offered.      Signature:  ERIKA St

## 2024-08-09 NOTE — GROUP NOTE
Group Therapy Note    Date: 8/9/2024    Group Start Time: 1350  Group End Time: 1440  Group Topic: Relaxation    [facility] Oral[facility] Mccl[facility]        Group Therapy Note    Attendees: 4/10     Topic:  To increase social interaction, explore music as a coping skill r/t relaxation/distraction/relating   to lyrics/memories, and communication skills.        Comments:    Patient did not participate in Cognitive Skills Group at 1350, despite staff encouragement   and explanation of benefits.  Patient remains seclusive to self and room.    Q15 minute safety checks maintained for patient safety and will continue to encourage   patient to attend unit programming.       Signature:  ROLA Nelson

## 2024-08-10 PROCEDURE — 6370000000 HC RX 637 (ALT 250 FOR IP): Performed by: PSYCHIATRY & NEUROLOGY

## 2024-08-10 PROCEDURE — 99232 SBSQ HOSP IP/OBS MODERATE 35: CPT | Performed by: NURSE PRACTITIONER

## 2024-08-10 PROCEDURE — 6370000000 HC RX 637 (ALT 250 FOR IP)

## 2024-08-10 PROCEDURE — 1240000000 HC EMOTIONAL WELLNESS R&B

## 2024-08-10 RX ADMIN — IBUPROFEN 400 MG: 400 TABLET, FILM COATED ORAL at 15:15

## 2024-08-10 RX ADMIN — NICOTINE POLACRILEX 4 MG: 4 LOZENGE ORAL at 20:59

## 2024-08-10 RX ADMIN — CLONIDINE HYDROCHLORIDE 0.1 MG: 0.1 TABLET ORAL at 01:49

## 2024-08-10 RX ADMIN — DICYCLOMINE HYDROCHLORIDE 20 MG: 10 CAPSULE ORAL at 01:49

## 2024-08-10 RX ADMIN — HYDROXYZINE HYDROCHLORIDE 50 MG: 50 TABLET, FILM COATED ORAL at 21:29

## 2024-08-10 RX ADMIN — NICOTINE POLACRILEX 4 MG: 4 LOZENGE ORAL at 18:25

## 2024-08-10 RX ADMIN — CLONIDINE HYDROCHLORIDE 0.1 MG: 0.1 TABLET ORAL at 21:29

## 2024-08-10 RX ADMIN — NICOTINE POLACRILEX 4 MG: 4 LOZENGE ORAL at 12:41

## 2024-08-10 RX ADMIN — HYDROXYZINE HYDROCHLORIDE 50 MG: 50 TABLET, FILM COATED ORAL at 01:49

## 2024-08-10 RX ADMIN — ARIPIPRAZOLE 20 MG: 20 TABLET ORAL at 08:43

## 2024-08-10 RX ADMIN — ACETAMINOPHEN 650 MG: 325 TABLET ORAL at 19:29

## 2024-08-10 RX ADMIN — CYCLOBENZAPRINE 10 MG: 10 TABLET, FILM COATED ORAL at 21:29

## 2024-08-10 RX ADMIN — TRAZODONE HYDROCHLORIDE 50 MG: 50 TABLET ORAL at 21:29

## 2024-08-10 RX ADMIN — IBUPROFEN 400 MG: 400 TABLET, FILM COATED ORAL at 01:49

## 2024-08-10 ASSESSMENT — PAIN DESCRIPTION - LOCATION
LOCATION: GENERALIZED
LOCATION: CHEST

## 2024-08-10 ASSESSMENT — PAIN SCALES - GENERAL
PAINLEVEL_OUTOF10: 6
PAINLEVEL_OUTOF10: 4
PAINLEVEL_OUTOF10: 2

## 2024-08-10 ASSESSMENT — PAIN DESCRIPTION - DESCRIPTORS
DESCRIPTORS: ACHING
DESCRIPTORS: ACHING

## 2024-08-10 ASSESSMENT — PAIN - FUNCTIONAL ASSESSMENT
PAIN_FUNCTIONAL_ASSESSMENT: ACTIVITIES ARE NOT PREVENTED
PAIN_FUNCTIONAL_ASSESSMENT: ACTIVITIES ARE NOT PREVENTED

## 2024-08-11 PROCEDURE — 1240000000 HC EMOTIONAL WELLNESS R&B

## 2024-08-11 PROCEDURE — 6370000000 HC RX 637 (ALT 250 FOR IP): Performed by: PSYCHIATRY & NEUROLOGY

## 2024-08-11 PROCEDURE — 99232 SBSQ HOSP IP/OBS MODERATE 35: CPT | Performed by: NURSE PRACTITIONER

## 2024-08-11 RX ORDER — NICOTINE 21 MG/24HR
1 PATCH, TRANSDERMAL 24 HOURS TRANSDERMAL DAILY
Status: DISCONTINUED | OUTPATIENT
Start: 2024-08-11 | End: 2024-08-13 | Stop reason: HOSPADM

## 2024-08-11 RX ADMIN — HALOPERIDOL 5 MG: 5 TABLET ORAL at 01:13

## 2024-08-11 RX ADMIN — HYDROXYZINE HYDROCHLORIDE 50 MG: 50 TABLET, FILM COATED ORAL at 20:50

## 2024-08-11 RX ADMIN — LORAZEPAM 2 MG: 1 TABLET ORAL at 01:13

## 2024-08-11 RX ADMIN — TRAZODONE HYDROCHLORIDE 50 MG: 50 TABLET ORAL at 20:50

## 2024-08-11 RX ADMIN — ARIPIPRAZOLE 20 MG: 20 TABLET ORAL at 09:46

## 2024-08-11 NOTE — BH NOTE
Po prns given at this time as ordered r/t  pt disrupting peer  by waking up and saying  beto you, beto you bitch    accepting of meds without issue

## 2024-08-12 PROCEDURE — APPSS30 APP SPLIT SHARED TIME 16-30 MINUTES

## 2024-08-12 PROCEDURE — 1240000000 HC EMOTIONAL WELLNESS R&B

## 2024-08-12 PROCEDURE — 99232 SBSQ HOSP IP/OBS MODERATE 35: CPT | Performed by: PSYCHIATRY & NEUROLOGY

## 2024-08-12 PROCEDURE — 99231 SBSQ HOSP IP/OBS SF/LOW 25: CPT | Performed by: INTERNAL MEDICINE

## 2024-08-12 PROCEDURE — 90833 PSYTX W PT W E/M 30 MIN: CPT | Performed by: PSYCHIATRY & NEUROLOGY

## 2024-08-12 PROCEDURE — 6370000000 HC RX 637 (ALT 250 FOR IP): Performed by: PSYCHIATRY & NEUROLOGY

## 2024-08-12 RX ADMIN — ARIPIPRAZOLE 20 MG: 20 TABLET ORAL at 07:28
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
## 2024-08-13 VITALS
HEIGHT: 62 IN | HEART RATE: 91 BPM | TEMPERATURE: 98.4 F | DIASTOLIC BLOOD PRESSURE: 89 MMHG | RESPIRATION RATE: 14 BRPM | OXYGEN SATURATION: 100 % | BODY MASS INDEX: 25.76 KG/M2 | WEIGHT: 140 LBS | SYSTOLIC BLOOD PRESSURE: 135 MMHG

## 2024-08-13 PROCEDURE — 6370000000 HC RX 637 (ALT 250 FOR IP): Performed by: NURSE PRACTITIONER

## 2024-08-13 PROCEDURE — 6370000000 HC RX 637 (ALT 250 FOR IP): Performed by: PSYCHIATRY & NEUROLOGY

## 2024-08-13 PROCEDURE — 99239 HOSP IP/OBS DSCHRG MGMT >30: CPT | Performed by: PSYCHIATRY & NEUROLOGY

## 2024-08-13 RX ORDER — NICOTINE 21 MG/24HR
1 PATCH, TRANSDERMAL 24 HOURS TRANSDERMAL DAILY
Qty: 30 PATCH | Refills: 3 | Status: SHIPPED | OUTPATIENT
Start: 2024-08-13

## 2024-08-13 RX ORDER — TRAZODONE HYDROCHLORIDE 50 MG/1
50 TABLET ORAL NIGHTLY PRN
Qty: 30 TABLET | Refills: 0 | Status: SHIPPED | OUTPATIENT
Start: 2024-08-13

## 2024-08-13 RX ORDER — ARIPIPRAZOLE 20 MG/1
20 TABLET ORAL DAILY
Qty: 30 TABLET | Refills: 3 | Status: SHIPPED | OUTPATIENT
Start: 2024-08-13

## 2024-08-13 RX ORDER — HYDROXYZINE 50 MG/1
50 TABLET, FILM COATED ORAL 3 TIMES DAILY PRN
Qty: 30 TABLET | Refills: 0 | Status: SHIPPED | OUTPATIENT
Start: 2024-08-13

## 2024-08-13 RX ADMIN — IBUPROFEN 400 MG: 400 TABLET, FILM COATED ORAL at 11:16

## 2024-08-13 RX ADMIN — ONDANSETRON 4 MG: 4 TABLET, ORALLY DISINTEGRATING ORAL at 10:17

## 2024-08-13 RX ADMIN — ARIPIPRAZOLE 20 MG: 20 TABLET ORAL at 08:41

## 2024-08-13 ASSESSMENT — PAIN SCALES - GENERAL: PAINLEVEL_OUTOF10: 2

## 2024-08-13 ASSESSMENT — PAIN DESCRIPTION - LOCATION: LOCATION: HEAD

## 2024-08-13 NOTE — PROGRESS NOTES
Behavioral Services  Medicare Certification Upon Admission    I certify that this patient's inpatient psychiatric hospital admission is medically necessary for:    [x] (1) Treatment which could reasonably be expected to improve this patient's condition,       [x] (2) Or for diagnostic study;     AND     [x](2) The inpatient psychiatric services are provided while the individual is under the care of a physician and are included in the individualized plan of care.    Estimated length of stay/service 4-7 days    Plan for post-hospital care home with outpatient [facility] f/u    Electronically signed by RAJESH LOREDO MD on 8/7/2024 at 7:47 PM      
   08/12/24 1514   Encounter Summary   Encounter Overview/Reason Behavioral Health   Service Provided For Patient   Last Encounter  08/12/24   Behavioral Health    Type  Spirituality Group   Assessment/Intervention/Outcome   Assessment Calm;Decisional conflict;Impaired resilience;Peaceful   Intervention Active listening;Discussed meaning/purpose;Explored/Affirmed feelings, thoughts, concerns;Explored Coping Skills/Resources;Nurtured Hope;Prayer (assurance of)/[place];Sustaining Presence/Ministry of presence   Outcome Receptive;Engaged in conversation       
  Daily Progress Note  8/10/2024    Patient Name: Lisy Colin    CHIEF COMPLAINT:  Acute Psychosis          SUBJECTIVE:      Patient seen face-to-face for follow-up assessment.  She is resting in bed and does appear to be sweating.  She however reports opioid withdrawal symptoms are less severe today.  Denies any nausea or emesis today.  No pupillary dilation present.  Did utilize Zofran once yesterday with benefit.  Patient thought process more linear today.  She is able to answer questions appropriately and does not exhibit thought blocking.  She denies any perceptual disturbances.  Still exhibiting psychomotor slowing.  Patient ambivalent regarding substance use treatment.  States she wishes to return back to her home.  She has been compliant with her scheduled psychotropic medication.  Aripiprazole titrated to 20 mg this morning.  Tolerating medication well.  No akathisia or EPS symptoms observed.  She has not required any emergency medications for agitation today.  Plan to discharge early next week with continued improvement.    Appetite:  [] Normal/Adequate/Unchanged  [] Increased  [x] Decreased      Sleep:       [] Normal/Adequate/Unchanged  [] Fair  [x] Poor      Group Attendance on Unit:   [] Yes  [x] Selectively    [] No    Medication Side Effects:  Patient denies any medication side effects at the time of assessment.         Mental Status Exam  Level of consciousness: Alert and awake.   Appearance: Appropriate attire for setting, seated in chair, with poor grooming and hygiene, disheveled.   Behavior/Motor: Psychomotor slowing  Attitude toward examiner: More cooperative today, attentive, fair eye contact  Speech: Latent, low volume, monotone  Mood: Constricted  Affect: Blunted  Thought processes: Linear and coherent.   Thought content: Denies homicidal ideation.  Suicidal Ideation: Denies suicidal ideations  Delusions: Paranoia improving  Perceptual Disturbance: Patient denies perceptual disturbances.  
  Daily Progress Note  8/12/2024    Patient Name: Lisy Colin    CHIEF COMPLAINT:  Acute psychosis         SUBJECTIVE:    Patient is seen today for a follow up assessment. She is found sitting in the common area watching television. She agrees to relocate to empty room for further privacy. She describes her mood as \"good\". Her affect is blunted and incongruent. She is appropriately groomed .She denies symptoms of depression and anxiety. She reports sleeping well and having a good appetite. She denies neglect of hygiene. She reports improvement in auditory and visual hallucinations. She denies paranoia or does not appear to respond to internal stimuli. She denies suicidal or homicidal ideations. She reports participating in group activities and denies behavioral issues overnight. She reports readiness to discharge and denies need for outpatient follow up. She states, \"I checked myself in and I won't ever need to come back\". She appears to minimize her psychiatric symptoms and denies lack of insight to mental health needs.     Patient adheres to scheduled psychiatric medication regimen. She denies adverse effects and does not demonstrate EPS symptoms of dystonia or akathisia. No use of emergency medications in the past 24 hours.No overnight behavioral disturbances reported. Staff denies perceptual disturbances or preoccupation with internal stimuli over night.     Patient does appear to minimize her psychiatric symptoms secondary to suspected paranoia. She is adamant to deny need for outpatient psychiatric services although she does admit to history of inpatient hospitalization for \"schizophrenic episodes\". Due to her lack of insight and psychiatric instability, she would benefit from ongoing inpatient hosptialization for symptom monitoring, safety and stability.       Appetite:  [x] Adequate/Unchanged  [] Increased  [] Decreased      Sleep:       [] Adequate/Unchanged  [x] Fair  [] Poor      Group Attendance on 
  Daily Progress Note  8/8/2024    Patient Name: Lisy Colin    CHIEF COMPLAINT:  Acute Psychosis          SUBJECTIVE:      Patient is seen today for a follow up assessment. Vitals and labs reviewed and appear to be within normal limits.  Lisy is compliant with scheduled Abilify. She did require IM emergency Haldol, Ativan and Benadryl last night around 7 PM.  Per nursing documentation patient is agitated as evidence by hitting self with bible, pacing, unstable affect, and stating \"my head is killing me, the devils inside of me.\"  Nursing staff report that Lisy has been isolative to her room, only coming out for needs.  She is seen at bedside today.  She is disheveled and appears tremulous. When asked about events leading up to hospitalization she states, \"I was hearing and seeing things.\"  She denies depression or anxiety today.  She states she slept well and denies issues with her appetite.      Lisy is denying suicidal ideation. She denies homicidal ideation.  She reports improvement in both auditory and visual hallucinations.  She does appear paranoid and  may be minimizing her symptoms secondary to her paranoia.  She is very flat and withdrawn.  She does not make any eye contact with writer.  She appears to lack significant insight into her mental illness. Due to her significant lack of insight as well as continuous need for emergency medications to keep her in behavioral control, she would benefit from further inpatient hospitalization for her safety and stability.       Appetite:  [] Normal/Adequate/Unchanged  [] Increased  [x] Decreased      Sleep:       [] Normal/Adequate/Unchanged  [] Fair  [x] Poor      Group Attendance on Unit:   [] Yes  [] Selectively    [x] No    Medication Side Effects:  Patient denies any medication side effects at the time of assessment.         Mental Status Exam  Level of consciousness: Alert and awake.   Appearance: Appropriate attire for setting, resting in bed, with 
  IN-PATIENT SERVICE  [facility] Internal Medicine  [facility] Internal Medicine   Mushtaq Mason MD; Evangelist Saldana MD; Moisés Zuluaga MD; Chun Robbins MD,   Rosalia Warner MD; Chaparro Banks MD; Jagruti Leahy MD; Bridger Zaragoza MD; Darren Astudillo MD    HISTORY AND PHYSICAL EXAMINATION/ CONSULT NOTE            Date:   8/12/2024  Patient name:  Lisy Colin  Date of admission:  8/6/2024 10:15 PM  MRN:   [id number]  Account:  [id number]  YOB: 2000  PCP:    Manish Lovell APRN - CNP  Room:   [address] [place] [address]  Code Status:    Full Code    Physician Requesting Consult: James Pena MD    Reason for Consult: History and physical, medical management    Chief Complaint:     Chief Complaint   Patient presents with    Mental Health Problem     Medical management    History Obtained From:     Patient, EMR, nursing staff    History of Present Illness:     25-year-old female admitted for symptoms of page    No significant medical history  Patient denies any chest pain palpitation cough difficulty breathing nausea vomiting diarrhea or urinary symptoms      Past Medical History:     Past Medical History:   Diagnosis Date    ADHD (attention deficit hyperactivity disorder)     Anxiety     Depression     Schizo-affective schizophrenia (HCC)         Past Surgical History:     Past Surgical History:   Procedure Laterality Date    ADENOIDECTOMY      FOOT FRACTURE SURGERY Bilateral 12/16/2021    LEFT CALCANEOUS OPEN REDUCTION INTERNAL FIXATION AND RIGHT FOOT FRACTURES OPEN REDUCTION INTERNAL FIXATION performed by Abilio Watts MD at [facility] OR    FOOT SURGERY Left 5/31/2023    Left foot removal of hardware performed by Abilio Watts MD at [facility] OR    TONSILLECTOMY  2003        Medications Prior to Admission:     Prior to Admission medications    Medication Sig Start Date End Date Taking? Authorizing Provider   ondansetron (ZOFRAN-ODT) 4 MG disintegrating tablet Take 1 tablet by 
CLINICAL PHARMACY NOTE: MEDS TO BEDS    Total # of Prescriptions Filled: 3   The following medications were delivered to the patient:  Aripiprazole 20mg  Hydroxyzine HCL 50mg  Nicotine 14mg patches    Additional Documentation: 8/13/24 delivered to ERICKA Chapa 11:45am kbg    -Trazodone too soon to be filled on file at [facility] [phone] [id number] (Outpatient Surgery entrance- [facility]/[address] entrance) Open Monday-Friday 9:00am-5:30pm closed from 1:00pm-1:30pm for lunch. Closed weekends and major holidays.   
Pharmacy Medication History Note      List of current medications patient is taking is complete.    Source of information: [facility] ([phone]) spoke with [place]ris Flor PDMP, [employer] dispense report    Changes made to medication list:  Medications removed (include reason, ex. therapy complete or physician discontinued, noncompliance):  Aspirin 325 mg (list clean up), Hydroxyzine (list clean up)    Medications flagged for provider review:  none    Medications added/doses adjusted:  none    Other notes (ex. Recent course of antibiotics, Coumadin dosing):  The patient has a previous history of taking Buspirone 10 mg in the morning and 20 mg nightly as needed. She has not filled the prescription since May 2024 and her prescription was discontinued by her provider.       Current Home Medication List at Time of Admission:  Prior to Admission medications    Medication Sig   ondansetron (ZOFRAN-ODT) 4 MG disintegrating tablet Take 1 tablet by mouth 3 times daily as needed for Nausea or Vomiting   traZODone (DESYREL) 50 MG tablet Take 1 tablet by mouth nightly as needed for Sleep   ARIPiprazole (ABILIFY) 15 MG tablet Take 1 tablet by mouth daily         Please let me know if you have any questions about this encounter. Thank you!    Electronically signed by Cordell Dennis RPH on 8/7/2024 at 8:21 AM     
RT ASSESSMENT TREATMENT GOALS    [x]Pt Goal: Pt will identify 1-2 positive coping skills by time of discharge.    []Pt Goal: Pt will identify 1-2 positive aspects of self by time of discharge.    [x]Pt Goal: Pt will remain on task/topic for 15-30 minutes during group by time of discharge.    []Pt Goal: Pt will identify 1-2 aspects of relapse prevention plan by time of discharge.    []Pt Goal: Pt will join in conversation with peers 1-2 times per group by time of discharge.    []Pt Goal: Pt will identify 1-2 new leisure interests by time of discharge.    [x]Pt Goal: Pt will not voice any delusional content by time of discharge.    
  (Positive cutoff 300 ng/mL)                  Phencyclidine, Urine 08/06/2024 NEGATIVE  NEGATIVE Final    Comment:       (Positive cutoff 25 ng/mL)                  Cannabinoid Scrn, Ur 08/06/2024 POSITIVE (A)  NEGATIVE Final    Comment:       (Positive cutoff 50 ng/mL)                  Oxycodone Screen, Ur 08/06/2024 NEGATIVE  NEGATIVE Final    Comment:       (Positive cutoff 100 ng/mL)                  Fentanyl, Ur 08/06/2024 POSITIVE (A)  NEGATIVE Final    Comment:       (Positive cutoff  5 ng/ml)            Test Information 08/06/2024 Assay provides medical screening only.  The absence of expected drug(s) and/or metabolite(s) may indicate diluted or adulterated urine, limitations of testing or timing of collection.   Final    Comment: Testing for legal purposes should be confirmed by another method.  To request confirmation   of test result, please call the lab within 7 days of sample submission.      TSH 08/06/2024 1.25  0.30 - 5.00 uIU/mL Final    Tricyclic Antidepressants, Urine 08/06/2024 NEGATIVE  NEGATIVE Final    Comment:       (Positive cutoff 1000 ng/mL)  Assay provides rapid clinical screening only.  Presumptive positive results for legal   purposes should be confirmed by another method.  To request confirmation, please call the   lab within 7 days of sample submission.      Pregnancy, Urine 08/06/2024 NEGATIVE  NEGATIVE Final    Comment: Specimens with hCG levels near the threshold of the test (25 mIU/mL) may give a negative or   indeterminate result.  In such cases, another test should be performed with a new specimen   in 48-72 hours.  If early pregnancy is suspected clinically in this setting, correlation   with quantitative serum b-hCG level is suggested.      Hemoglobin A1C 08/06/2024 4.9  4.0 - 6.0 % Final    Estimated Avg Glucose 08/06/2024 94  mg/dL Final    Comment: The ADA and AACC recommend providing the estimated average glucose result to permit better   patient understanding of their 
you for the consult.  Medicine will sign off. Please do not hesitate to contact us for any issues or concerns.         Consultations:   IP CONSULT TO INTERNAL MEDICINE      Rosalia Warner MD  8/8/2024  3:25 PM    Copy sent to Manish Lovell APRN - CNP    Please note that this chart was generated using voice recognition Dragon dictation software.  Although every effort was made to ensure the accuracy of this automated transcription, some errors in transcription may have occurred.   
cannabinoid)         PLAN  Patient symptoms are: Somewhat improving  Continue same medications for now.  Aripiprazole at maximum dose.  Medications as determined by attending physician  Monitor need and frequency of PRN medications.  Encourage participation in groups and milieu.  Probable discharge is next week    Electronically signed by LYNNE Mae CNP on 8/11/2024 at 5:24 PM    **This report has been created using voice recognition software. It may contain minor errors which are inherent in voice recognition technology.**

## 2024-08-13 NOTE — TRANSITION OF CARE
Sleep aid            STOP taking these medications      ondansetron 4 MG disintegrating tablet  Commonly known as: ZOFRAN-ODT               Where to Get Your Medications        These medications were sent to [id number] - [place], [place] - [address] -  [phone] - F [phone]  [address] [place] [address] [place] [address]      Phone: [phone]   ARIPiprazole 20 MG tablet  hydrOXYzine HCl 50 MG tablet  nicotine 14 MG/24HR  traZODone 50 MG tablet         Unresulted Labs (24h ago, onward)      None            To obtain results of studies pending at discharge, please contact [phone]    Follow-up Information       Follow up With Specialties Details Why Contact Info Additional Information    [facility] - MAIN OFFICE Behavioral Health Follow up on 8/29/2024 You have a mental health follow up appointment at 9:30 am with Ms. Eduardo Barrientos  [place] [address]  [phone] [facility]             Advanced Directive:   Does the patient have an appointed surrogate decision maker? No  Does the patient have a Medical Advance Directive? No  Does the patient have a Psychiatric Advance Directive? No  If the patient does not have a surrogate or Medical Advance Directive AND Psychiatric Advance Directive, the patient was offered information on these advance directives Patient declined to complete    Patient Instructions: Please continue all medications until otherwise directed by physician.  yes    Tobacco Cessation Discharge Plan:   Is the patient a tobacco user  and needs referral for tobacco cessation? Yes  Patient referred to the following for tobacco cessation with an appointment? No  Patient was offered medication to assist with tobacco cessation at discharge? Yes    Alcohol/Substance Abuse Discharge Plan:   Does the patient have a history of substance/alcohol abuse and requires a referral for treatment? No  Patient referred to the following for substance/alcohol abuse

## 2024-08-13 NOTE — PLAN OF CARE
Problem: Linda  Goal: Will exhibit normal sleep and speech and no impulsivity  Description: INTERVENTIONS:  1. Administer medication as ordered  2. Set limits on impulsive behavior  3. Make attempts to decrease external stimuli as possible  8/8/2024 2039 by Malvin Jovel RN  Outcome: Progressing  Patient is controlled but reports continued poor sleep.     Problem: Psychosis  Goal: Will report no hallucinations or delusions  Description: INTERVENTIONS:  1. Administer medication as  ordered  2. Assist with reality testing to support increasing orientation  3. Assess if patient's hallucinations or delusions are encouraging self harm or harm to others and intervene as appropriate  8/8/2024 2039 by Malvin Jovel, RN  Outcome: Progressing  Patient reports auditory hallucinations but does not speak to content.      Problem: Anxiety  Goal: Will report anxiety at manageable levels  Description: INTERVENTIONS:  1. Administer medication as ordered  2. Teach and rehearse alternative coping skills  3. Provide emotional support with 1:1 interaction with staff  8/8/2024 2039 by Malvin Jovel RN  Outcome: Progressing  Patient is obviously anxious and reports improving depression. Patient denies suicidal ideation, homicidal ideation at this time.  She is more social, spending most of shift in dayroom. Safety plan reviewed with patient, agrees to approach staff when feeling upset.  15 minute and random checks maintained for safety.  No violent or escalating behaviors noted during this shift. Patient is currently calm, controlled and medication-compliant.     Problem: Pain  Goal: Verbalizes/displays adequate comfort level or baseline comfort level  8/8/2024 2039 by Malvin Jovel, RN  Outcome: Progressing  Patient denies pain at this time.         
  Problem: Ilnda  Goal: Will exhibit normal sleep and speech and no impulsivity  Description: INTERVENTIONS:  1. Administer medication as ordered  2. Set limits on impulsive behavior  3. Make attempts to decrease external stimuli as possible  Outcome: Progressing   Patient is calm, controlled and medication compliant. Patient denies suicidal ideations, is flat/isolative to self/room Patient is reports eating and sleeping adequately with safety checks Q15min and at irregular intervals.      Problem: Psychosis  Goal: Will report no hallucinations or delusions  Description: INTERVENTIONS:  1. Administer medication as  ordered  2. Assist with reality testing to support increasing orientation  3. Assess if patient's hallucinations or delusions are encouraging self harm or harm to others and intervene as appropriate  Outcome: Progressing    Patient is calm, controlled and medication compliant. Patient denies suicidal ideations, is flat/isolative to self/room Patient is reports eating and sleeping adequately with safety checks Q15min and at irregular intervals.      Problem: Anxiety  Goal: Will report anxiety at manageable levels  Description: INTERVENTIONS:  1. Administer medication as ordered  2. Teach and rehearse alternative coping skills  3. Provide emotional support with 1:1 interaction with staff  Outcome: Progressing    Patient is calm, controlled and medication compliant. Patient denies suicidal ideations, is flat/isolative to self/room Patient is reports eating and sleeping adequately with safety checks Q15min and at irregular intervals.      Problem: Pain  Goal: Verbalizes/displays adequate comfort level or baseline comfort level  Outcome: Progressing   Pain is managed as needed with PRN medication  
  Problem: Linda  Goal: Will exhibit normal sleep and speech and no impulsivity  Description: INTERVENTIONS:  1. Administer medication as ordered  2. Set limits on impulsive behavior  3. Make attempts to decrease external stimuli as possible  8/10/2024 1948 by Malvin Jovel RN  Outcome: Progressing  Patient reports sleeping better.     Problem: Psychosis  Goal: Will report no hallucinations or delusions  Description: INTERVENTIONS:  1. Administer medication as  ordered  2. Assist with reality testing to support increasing orientation  3. Assess if patient's hallucinations or delusions are encouraging self harm or harm to others and intervene as appropriate  8/10/2024 1948 by Malvin Jovel RN  Outcome: Progressing  Patient denies suicidal ideation, homicidal ideation and hallucinations at this time.       Problem: Anxiety  Goal: Will report anxiety at manageable levels  Description: INTERVENTIONS:  1. Administer medication as ordered  2. Teach and rehearse alternative coping skills  3. Provide emotional support with 1:1 interaction with staff  8/10/2024 1948 by Malvin Jovel RN  Outcome: Progressing  Patient reports withdrawal symptoms are beginning to subside. She spends most of shift in dayroom, socializing with peers. She feels ready for discharge. Safety plan reviewed with patient, agrees to approach staff when feeling upset.  15 minute and random checks maintained for safety.  No violent or escalating behaviors noted during this shift. Patient is currently calm, controlled and medication-compliant.      Problem: Pain  Goal: Verbalizes/displays adequate comfort level or baseline comfort level  8/10/2024 1948 by Malvin Jovel, RN  Outcome: Progressing  Patient reports pain that is relieved by by available medications.      
  Problem: Linda  Goal: Will exhibit normal sleep and speech and no impulsivity  Description: INTERVENTIONS:  1. Administer medication as ordered  2. Set limits on impulsive behavior  3. Make attempts to decrease external stimuli as possible  8/11/2024 2222 by Lauryn Edwards, RN  Outcome: Progressing   Patient is cooperative, behavior is controlled, no manic behaviors noted.    Problem: Psychosis  Goal: Will report no hallucinations or delusions  Description: INTERVENTIONS:  1. Administer medication as  ordered  2. Assist with reality testing to support increasing orientation  3. Assess if patient's hallucinations or delusions are encouraging self harm or harm to others and intervene as appropriate  8/11/2024 2222 by Lauryn Edwards, RN  Outcome: Progressing   Patient is somewhat preoccupied and anxious on approach but is visible in milieu, selectively social with peers, cooperative with treatment, denies hallucinations, easily distracted, accepting to evening snack and drink.     Problem: Anxiety  Goal: Will report anxiety at manageable levels  Description: INTERVENTIONS:  1. Administer medication as ordered  2. Teach and rehearse alternative coping skills  3. Provide emotional support with 1:1 interaction with staff  8/11/2024 2222 by Lauryn Edwards, RN  Outcome: Progressing  Patient is somewhat preoccupied and anxious on approach but is visible in milieu, selectively social with peers, cooperative with treatment, denies hallucinations, easily distracted, accepting to evening snack and drink.      Problem: Pain  Goal: Verbalizes/displays adequate comfort level or baseline comfort level  8/11/2024 2222 by Lauryn Edwards, RN  Outcome: Progressing   Patient denies pain during assessment, will continue to monitor and provide intervention as needed.    
  Problem: Linda  Goal: Will exhibit normal sleep and speech and no impulsivity  Description: INTERVENTIONS:  1. Administer medication as ordered  2. Set limits on impulsive behavior  3. Make attempts to decrease external stimuli as possible  8/12/2024 2034 by Shamir Hughes, RN  Outcome: Progressing   Sleep reported to be improved. Makes appropriate requests & is not impulsive this evening.  Problem: Psychosis  Goal: Will report no hallucinations or delusions  Description: INTERVENTIONS:  1. Administer medication as  ordered  2. Assist with reality testing to support increasing orientation  3. Assess if patient's hallucinations or delusions are encouraging self harm or harm to others and intervene as appropriate  8/12/2024 2034 by Shamir Hughes, RN  Outcome: Progressing   Denies hallucinations without attending behaviors. Thoughts are clear/reality based.  Problem: Anxiety  Goal: Will report anxiety at manageable levels  Description: INTERVENTIONS:  1. Administer medication as ordered  2. Teach and rehearse alternative coping skills  3. Provide emotional support with 1:1 interaction with staff  8/12/2024 2034 by Shamir Hughes, RN  Outcome: Progressing   Anxiety continues but she is much calmer than last week. Not focused on med's as much. Watches tv w/peer.  Problem: Pain  Goal: Verbalizes/displays adequate comfort level or baseline comfort level  8/12/2024 2034 by Shamir Hughes, RN  Outcome: Progressing   Denies current pain or discomfort.  
  Problem: Linda  Goal: Will exhibit normal sleep and speech and no impulsivity  Description: INTERVENTIONS:  1. Administer medication as ordered  2. Set limits on impulsive behavior  3. Make attempts to decrease external stimuli as possible  8/8/2024 0412 by Maliha Maradiaga LPN  Outcome: Not Progressing  Note: Patient is religiously preoccupied this shift. Patient is pacing, unstable in affect and hit herself with her bible this shift. Patient spoke with writer and was able to be redirected this shift. Patient educated and agrees to come to staff if needed this shift. Patient monitored every 15 minutes with environmental safety checks.      Problem: Psychosis  Goal: Will report no hallucinations or delusions  Description: INTERVENTIONS:  1. Administer medication as  ordered  2. Assist with reality testing to support increasing orientation  3. Assess if patient's hallucinations or delusions are encouraging self harm or harm to others and intervene as appropriate  8/8/2024 0412 by Maliha Maradiaga LPN  Outcome: Progressing  Note: Patient denies hallucinations this shift. Patient educated and agrees to come to staff if needed this shift. Patient monitored every 15 minutes with environmental safety checks.      Problem: Anxiety  Goal: Will report anxiety at manageable levels  Description: INTERVENTIONS:  1. Administer medication as ordered  2. Teach and rehearse alternative coping skills  3. Provide emotional support with 1:1 interaction with staff  8/8/2024 0412 by Maliha Maradiaga LPN  Outcome: Progressing  Note: Patient admits to anxiety this shift. Patient is out pacing unit, aloof of peers. Patient denies suicidal/homicidal ideations and hallucinations this shift. Patient cooperative with assessments and medications this shift. Patient monitored every 15 minutes with environmental safety checks.      Problem: Pain  Goal: Verbalizes/displays adequate comfort level or baseline comfort level  Outcome: Progressing     Problem: 
  Problem: Linda  Goal: Will exhibit normal sleep and speech and no impulsivity  Description: INTERVENTIONS:  1. Administer medication as ordered  2. Set limits on impulsive behavior  3. Make attempts to decrease external stimuli as possible  8/9/2024 1116 by Estela Juarez RN  Outcome: Progressing  Note:   No manic episodes noted per shift. Patient has been in bed during this shift, up for meals, shower and needs. Patient has been med compliant    8/9/2024 0907 by Andressa Conde RN  Outcome: Progressing     Problem: Psychosis  Goal: Will report no hallucinations or delusions  Description: INTERVENTIONS:  1. Administer medication as  ordered  2. Assist with reality testing to support increasing orientation  3. Assess if patient's hallucinations or delusions are encouraging self harm or harm to others and intervene as appropriate  8/9/2024 1116 by Estela Juarez RN  Outcome: Progressing  Note: Patient denies hallucinations and delusions. Patient educated and agrees to come to staff if needed this shift. Patient remains on Q15 minutes checks for safety.   8/9/2024 0907 by Andressa Conde RN  Outcome: Progressing     Problem: Anxiety  Goal: Will report anxiety at manageable levels  Description: INTERVENTIONS:  1. Administer medication as ordered  2. Teach and rehearse alternative coping skills  3. Provide emotional support with 1:1 interaction with staff  8/9/2024 1116 by Estela Juarez RN  Outcome: Progressing  Note:   Patient denies anxiety and has not demonstrated any signs of anxiety per shift.    8/9/2024 0907 by Andressa Conde RN  Outcome: Progressing     Problem: Pain  Goal: Verbalizes/displays adequate comfort level or baseline comfort level  8/9/2024 1116 by Estela Juarez RN  Outcome: Progressing  Note: Patient reports pain 6/10. Patient encouraged to monitor pain and is advised to request assistance. Will continue to monitor for further pain  8/9/2024 0907 by Andressa Conde RN  Outcome: 
  Problem: Linda  Goal: Will exhibit normal sleep and speech and no impulsivity  Description: INTERVENTIONS:  1. Administer medication as ordered  2. Set limits on impulsive behavior  3. Make attempts to decrease external stimuli as possible  8/9/2024 2011 by Malvin Jovel RN  Outcome: Progressing  Patient reports poor sleep due to room mate withdrawing.     Problem: Psychosis  Goal: Will report no hallucinations or delusions  Description: INTERVENTIONS:  1. Administer medication as  ordered  2. Assist with reality testing to support increasing orientation  3. Assess if patient's hallucinations or delusions are encouraging self harm or harm to others and intervene as appropriate  8/9/2024 2011 by Malvin Jovel, RN  Outcome: Progressing  Patient denies suicidal ideation, homicidal ideation and hallucinations at this time.       Problem: Anxiety  Goal: Will report anxiety at manageable levels  Description: INTERVENTIONS:  1. Administer medication as ordered  2. Teach and rehearse alternative coping skills  3. Provide emotional support with 1:1 interaction with staff  8/9/2024 2011 by Malvin Jovel, RN  Outcome: Progressing  Patient reports withdrawal from fentanyl continues. Poor sleep. She spends time in dayroom but is mostly aloof of peers. Patient states depression and anxiety symptoms are slightly better. She wonders why she is not on suboxone. Safety plan reviewed with patient, agrees to approach staff when feeling upset.  15 minute and random checks maintained for safety.  No violent or escalating behaviors noted during this shift. Patient is currently calm, controlled and medication-compliant.      Problem: Pain  Goal: Verbalizes/displays adequate comfort level or baseline comfort level  8/9/2024 2011 by Malvin Jovel, RN  Outcome: Progressing  Patient reports moderate pain from withdrawal that is poorly controlled by available medications.     
  Problem: Linda  Goal: Will exhibit normal sleep and speech and no impulsivity  Description: INTERVENTIONS:  1. Administer medication as ordered  2. Set limits on impulsive behavior  3. Make attempts to decrease external stimuli as possible  Note: Ms. Colin has not demonstrated manic behaviors on this shift, she has remained in bed asleep during this shift. Ms. Colin did come out for breakfast and lunch. She attended one group while she was waiting for a nicotine supplement before returning to her room.      Problem: Psychosis  Goal: Will report no hallucinations or delusions  Description: INTERVENTIONS:  1. Administer medication as  ordered  2. Assist with reality testing to support increasing orientation  3. Assess if patient's hallucinations or delusions are encouraging self harm or harm to others and intervene as appropriate  Note: Ms. Colin is guarded and aloof while in the day area. She was not forthcoming during our interaction and did appear paranoid while in the day area. Ms. Colin was not observed tending internally, but does endorse auditory hallucinations and did not want to elaborate.      Problem: Anxiety  Goal: Will report anxiety at manageable levels  Description: INTERVENTIONS:  1. Administer medication as ordered  2. Teach and rehearse alternative coping skills  3. Provide emotional support with 1:1 interaction with staff  Note: Ms. Colin has not demonstrated anxious behaviors during this shift. She sits with peers during meals, and returns to her room afterward. She denies suicidal ideation and thoughts of harm to self and others.      
  Problem: Linda  Goal: Will exhibit normal sleep and speech and no impulsivity  Description: INTERVENTIONS:  1. Administer medication as ordered  2. Set limits on impulsive behavior  3. Make attempts to decrease external stimuli as possible  Outcome: Progressing    Patient is calm, controlled and medication compliant. Patient denies suicidal ideations, is flat/isolative to self/room Patient is polite with staff. Patient is flat, social and reports eating and sleeping adequately with safety checks Q15min and at irregular intervals.     Problem: Psychosis  Goal: Will report no hallucinations or delusions  Description: INTERVENTIONS:  1. Administer medication as  ordered  2. Assist with reality testing to support increasing orientation  3. Assess if patient's hallucinations or delusions are encouraging self harm or harm to others and intervene as appropriate  Outcome: Progressing   Patient is calm, controlled and medication compliant. Patient denies suicidal ideations, is flat/isolative to self/room Patient is polite with staff. Patient is flat, social and reports eating and sleeping adequately with safety checks Q15min and at irregular intervals.     Problem: Anxiety  Goal: Will report anxiety at manageable levels  Description: INTERVENTIONS:  1. Administer medication as ordered  2. Teach and rehearse alternative coping skills  3. Provide emotional support with 1:1 interaction with staff  Outcome: Progressing   Patient is calm, controlled and medication compliant. Patient denies suicidal ideations, is flat/isolative to self/room Patient is polite with staff. Patient is flat, social and reports eating and sleeping adequately with safety checks Q15min and at irregular intervals.     Problem: Pain  Goal: Verbalizes/displays adequate comfort level or baseline comfort level  Outcome: Progressing   Pain is managed as needed with PRN medications.  
[facility]  Day 3 Interdisciplinary Treatment Plan NOTE    Review Date & Time: 8/9/2024   0900    Admission Type:   Admission Type: Voluntary    Reason for admission:  Reason for Admission: Patient has been expereincing increase in paranoia and stated she is in a \"system where the government and other people are hearing and controling her thoughts\". Patient has a history of schizophrenia. Patient is positive for Fentanyl, cocaine, & opiates. Patient reports compliance with medications, however, states they are not working.  Estimated Length of Stay: 5-7 days  Estimated Discharge Date Update: to be determined by physician    PATIENT STRENGTHS:  Patient Strengths    Patient Strengths and Limitations:Limitations: Difficult relationships / poor social skills, Multiple barriers to leisure interests, Difficulty problem solving/relies on others to help solve problems, External locus of control, Unrealistic self-view  Addictive Behavior:Addictive Behavior  In the Past 3 Months, Have You Felt or Has Someone Told You That You Have a Problem With  : None  Medical Problems:  Past Medical History:   Diagnosis Date    ADHD (attention deficit hyperactivity disorder)     Anxiety     Depression     Schizo-affective schizophrenia (HCC)        Risk:  Fall Risk   Alex Scale Alex Scale Score: 22  BVC    Change in scores no Changes to plan of Care no    Status EXAM:   Mental Status and Behavioral Exam  Normal: No  Level of Assistance: Independent/Self  Facial Expression: Avoids Gaze, Flat, Worried  Affect: Blunt  Level of Consciousness: Alert  Frequency of Checks: 4 times per hour, close  Mood:Normal: No  Mood: Anxious, Depressed, Suspicious  Motor Activity:Normal: No  Motor Activity: Decreased  Eye Contact: Fair  Observed Behavior: Preoccupied, Withdrawn, Cooperative, Guarded  Sexual Misconduct History: Current - no  Preception: [place] to person, [place] to time, [place] to place, [place] to 
[facility]  Initial Interdisciplinary Treatment Plan NO      Original treatment plan Date & Time: 8/7/24 0900    Admission Type:  Admission Type: Voluntary    Reason for admission:   Reason for Admission: Patient has been expereincing increase in paranoia and stated she is in a \"system where the government and other people are hearing and controling her thoughts\". Patient has a history of schizophrenia. Patient is positive for Fentanyl, cocaine, & opiates. Patient reports compliance with medications, however, states they are not working.    Estimated Length of Stay:  5-7days  Estimated Discharge Date: to be determined by physician    PATIENT STRENGTHS:  Patient Strengths:   Patient Strengths and Limitations:   Addictive Behavior: Addictive Behavior  In the Past 3 Months, Have You Felt or Has Someone Told You That You Have a Problem With  : None  Medical Problems:  Past Medical History:   Diagnosis Date    ADHD (attention deficit hyperactivity disorder)     Anxiety     Depression     Schizo-affective schizophrenia (HCC)      Status EXAM:Mental Status and Behavioral Exam  Normal: No  Level of Assistance: Independent/Self  Facial Expression: Worried  Affect: Unstable  Level of Consciousness: Alert  Frequency of Checks: 4 times per hour, close  Mood:Normal: No  Mood: Anxious, Suspicious, Labile  Motor Activity:Normal: Yes  Eye Contact: Fair  Observed Behavior: Preoccupied, Tearful  Sexual Misconduct History: Current - no  Preception: [place] to person, [place] to time, [place] to place, [place] to situation  Attention:Normal: No  Attention: Distractible  Thought Processes: Unremarkable  Thought Content:Normal: No  Thought Content: Delusions, Paranoia, Preoccupations  Depression Symptoms: Impaired concentration  Anxiety Symptoms: Generalized  Linda Symptoms: Labile, Poor judgment  Hallucinations: None  Delusions: Yes  Delusions: Paranoid  Memory:Normal: No  Memory: Poor recent  Insight and Judgment: 
Patient denied pain at time of assessment. Patient educated on 0-10 pain scale.   8/11/2024 2222 by Lauryn Edwards, RN  Outcome: Progressing     
room for breakfast then returned back to her room. She denies suicidal ideation and thoughts of harm to self and others.    8/8/2024 0412 by Maliha Maradiaga LPN  Outcome: Progressing  Note: Patient admits to anxiety this shift. Patient is out pacing unit, aloof of peers. Patient denies suicidal/homicidal ideations and hallucinations this shift. Patient cooperative with assessments and medications this shift. Patient monitored every 15 minutes with environmental safety checks.

## 2024-08-13 NOTE — CARE COORDINATION
Name: Lisy Colin    : 2000    Auth number: [id number]     Discharge Date: 24    Destination: home     Discharge Medications:      Medication List        START taking these medications      nicotine 14 MG/24HR  Commonly known as: NICODERM CQ  Place 1 patch onto the skin daily  Notes to patient: Smoking cessation            CHANGE how you take these medications      ARIPiprazole 20 MG tablet  Commonly known as: ABILIFY  Take 1 tablet by mouth daily  What changed:   medication strength  how much to take  Notes to patient: Clears thoughts            CONTINUE taking these medications      hydrOXYzine HCl 50 MG tablet  Commonly known as: ATARAX  Take 1 tablet by mouth 3 times daily as needed for Anxiety  Notes to patient: xiety     traZODone 50 MG tablet  Commonly known as: DESYREL  Take 1 tablet by mouth nightly as needed for Sleep  Notes to patient: Sleep aid            STOP taking these medications      ondansetron 4 MG disintegrating tablet  Commonly known as: ZOFRAN-ODT               Where to Get Your Medications        These medications were sent to [facility] [address] [place] [address] -  [phone] - F [phone]  [address] [place] [address]      Phone: [phone]   ARIPiprazole 20 MG tablet  hydrOXYzine HCl 50 MG tablet  nicotine 14 MG/24HR  traZODone 50 MG tablet         Follow Up Appointment: [facility] - MAIN OFFICE  [address] [address]  [phone]  Follow up on 2024  You have a mental health follow up appointment at 9:30 am with Ms. Clark     
Social work to provide support and assist with discharge planning.

## 2024-08-13 NOTE — DISCHARGE INSTRUCTIONS
Information:  Medications:   Medication summary provided   I understand that I should take only the medications on my list.     -why and when I need to take each medicine.     -which side effects to watch for.     -that I should carry my medication information at all times in case of     Emergency situations.    I will take all of my medicines to follow up appointments.     -check with my physician or pharmacist before taking any new    Medication, over the counter product or drink alcohol.    -Ask about food, drug or dietary supplement interactions.    -discard old lists and update records with medication providers.    Notify Physician:  Notify physician if you notice:   Always call 911 if you feel your life is in danger  In case of an emergency call 911 immediately!  If 911 is not available call your local emergency medical system for help    Behavioral Health Follow Up:  Original Referral Source:ER  Discharge Diagnosis: Linda (HCC) [F30.9]  Acute psychosis (HCC) [F23]  Polysubstance use disorder [F19.90]  Recommendations for Level of Care: follow up   Patient status at discharge: stable  My hospital  was: [facility]  Aftercare plan faxed: yes   -faxed by: staff   -date: 8/13/24   -time: 1100  Prescriptions: Filled at [facility] outpatient pharmacy    Smoking: Quit Smoking.   Call the [employer]'s smoking quitline at 5-821-87W-QUIT  Know the signs of a heart attack   If you have any of the following symptoms call 911 immediately, do not wait more    Than five minutes.    1. Pressure, fullness and/ or squeezing in the center of the chest spreading to    The jaw, neck or shoulder.    2. Chest discomfort with light headedness, fainting, sweating, nausea or    Shortness of breath.   3. Upper abdominal pressure or discomfort.   4. Lower chest pain, back pain, unusual fatigue, shortness of breath, nausea   Or dizziness.     General Information:   Questions regarding your bill: Call HELP program ([phone])

## 2024-08-13 NOTE — DISCHARGE INSTR - DIET
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
## 2024-09-19 ENCOUNTER — HOSPITAL ENCOUNTER (EMERGENCY)
Age: 24
Discharge: HOME | End: 2024-09-19
Attending: EMERGENCY MEDICINE
Payer: COMMERCIAL

## 2024-09-19 VITALS
RESPIRATION RATE: 18 BRPM | HEART RATE: 108 BPM | SYSTOLIC BLOOD PRESSURE: 136 MMHG | TEMPERATURE: 98.6 F | WEIGHT: 140 LBS | BODY MASS INDEX: 25.6 KG/M2 | DIASTOLIC BLOOD PRESSURE: 85 MMHG | OXYGEN SATURATION: 100 %
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
## 2024-09-19 DIAGNOSIS — Z59.00 HOMELESS: Primary | ICD-10-CM

## 2024-09-19 PROCEDURE — 99282 EMERGENCY DEPT VISIT SF MDM: CPT

## 2024-09-19 SDOH — ECONOMIC STABILITY - HOUSING INSECURITY: HOMELESSNESS UNSPECIFIED: Z59.00

## 2024-09-19 ASSESSMENT — VISUAL ACUITY: OU: 1

## 2024-09-23 ENCOUNTER — TELEPHONE (OUTPATIENT)
Dept: FAMILY MEDICINE CLINIC | Age: 24
End: 2024-09-23

## 2024-09-27 ENCOUNTER — HOSPITAL ENCOUNTER (INPATIENT)
Age: 24
LOS: 6 days | Discharge: HOME | DRG: 750 | End: 2024-10-03
Attending: EMERGENCY MEDICINE | Admitting: PSYCHIATRY & NEUROLOGY
Payer: COMMERCIAL

## 2024-09-27 DIAGNOSIS — N30.01 ACUTE CYSTITIS WITH HEMATURIA: ICD-10-CM

## 2024-09-27 DIAGNOSIS — F25.9 SCHIZOAFFECTIVE DISORDER, UNSPECIFIED TYPE (HCC): Primary | ICD-10-CM

## 2024-09-27 LAB
ALBUMIN SERPL-MCNC: 4.6 G/DL (ref 3.5–5.2)
ALP SERPL-CCNC: 70 U/L (ref 35–104)
ALT SERPL-CCNC: 16 U/L (ref 5–33)
AMPHET UR QL SCN: NEGATIVE
ANION GAP SERPL CALCULATED.3IONS-SCNC: 16 MMOL/L (ref 9–17)
AST SERPL-CCNC: 20 U/L
BACTERIA URNS QL MICRO: ABNORMAL
BARBITURATES UR QL SCN: NEGATIVE
BASOPHILS # BLD: 0 K/UL (ref 0–0.2)
BASOPHILS NFR BLD: 0 % (ref 0–2)
BENZODIAZ UR QL: NEGATIVE
BILIRUB SERPL-MCNC: 0.6 MG/DL (ref 0.3–1.2)
BILIRUB UR QL STRIP: NEGATIVE
BUN SERPL-MCNC: 18 MG/DL (ref 6–20)
CALCIUM SERPL-MCNC: 9.6 MG/DL (ref 8.6–10.4)
CANNABINOIDS UR QL SCN: POSITIVE
CASTS #/AREA URNS LPF: ABNORMAL /LPF
CHLORIDE SERPL-SCNC: 102 MMOL/L (ref 98–107)
CLARITY UR: ABNORMAL
CO2 SERPL-SCNC: 23 MMOL/L (ref 20–31)
COCAINE UR QL SCN: NEGATIVE
COLOR UR: YELLOW
CREAT SERPL-MCNC: 1 MG/DL (ref 0.5–0.9)
EOSINOPHIL # BLD: 0 K/UL (ref 0–0.4)
EOSINOPHILS RELATIVE PERCENT: 0 % (ref 0–4)
EPI CELLS #/AREA URNS HPF: ABNORMAL /HPF
ERYTHROCYTE [DISTWIDTH] IN BLOOD BY AUTOMATED COUNT: 13.9 % (ref 11.5–14.9)
FENTANYL UR QL: POSITIVE
GFR, ESTIMATED: 82 ML/MIN/1.73M2
GLUCOSE SERPL-MCNC: 105 MG/DL (ref 70–99)
GLUCOSE UR STRIP-MCNC: NEGATIVE MG/DL
HCG SERPL QL: NEGATIVE
HCT VFR BLD AUTO: 41.8 % (ref 36–46)
HGB BLD-MCNC: 13.8 G/DL (ref 12–16)
HGB UR QL STRIP.AUTO: NEGATIVE
KETONES UR STRIP-MCNC: ABNORMAL MG/DL
LEUKOCYTE ESTERASE UR QL STRIP: ABNORMAL
LYMPHOCYTES NFR BLD: 1.26 K/UL (ref 1–4.8)
LYMPHOCYTES RELATIVE PERCENT: 7 % (ref 24–44)
MCH RBC QN AUTO: 30.1 PG (ref 26–34)
MCHC RBC AUTO-ENTMCNC: 33 G/DL (ref 31–37)
MCV RBC AUTO: 91.3 FL (ref 80–100)
METHADONE UR QL: NEGATIVE
MONOCYTES NFR BLD: 0.72 K/UL (ref 0.1–1.3)
MONOCYTES NFR BLD: 4 % (ref 1–7)
MORPHOLOGY: NORMAL
NEUTROPHILS NFR BLD: 89 % (ref 36–66)
NEUTS SEG NFR BLD: 16.02 K/UL (ref 1.3–9.1)
NITRITE UR QL STRIP: NEGATIVE
OPIATES UR QL SCN: NEGATIVE
OXYCODONE UR QL SCN: NEGATIVE
PCP UR QL SCN: NEGATIVE
PH UR STRIP: 5 [PH] (ref 5–8)
PLATELET # BLD AUTO: 429 K/UL (ref 150–450)
PMV BLD AUTO: 7.5 FL (ref 6–12)
POTASSIUM SERPL-SCNC: 3.5 MMOL/L (ref 3.7–5.3)
PROT SERPL-MCNC: 8 G/DL (ref 6.4–8.3)
PROT UR STRIP-MCNC: NEGATIVE MG/DL
RBC # BLD AUTO: 4.58 M/UL (ref 4–5.2)
RBC #/AREA URNS HPF: ABNORMAL /HPF
SODIUM SERPL-SCNC: 141 MMOL/L (ref 135–144)
SP GR UR STRIP: 1.03 (ref 1–1.03)
TEST INFORMATION: ABNORMAL
UROBILINOGEN UR STRIP-ACNC: NORMAL EU/DL (ref 0–1)
WBC #/AREA URNS HPF: ABNORMAL /HPF
WBC OTHER # BLD: 18 K/UL (ref 3.5–11)

## 2024-09-27 PROCEDURE — 36415 COLL VENOUS BLD VENIPUNCTURE: CPT

## 2024-09-27 PROCEDURE — 85025 COMPLETE CBC W/AUTO DIFF WBC: CPT

## 2024-09-27 PROCEDURE — 99222 1ST HOSP IP/OBS MODERATE 55: CPT | Performed by: NURSE PRACTITIONER

## 2024-09-27 PROCEDURE — 6370000000 HC RX 637 (ALT 250 FOR IP): Performed by: INTERNAL MEDICINE

## 2024-09-27 PROCEDURE — 6370000000 HC RX 637 (ALT 250 FOR IP): Performed by: NURSE PRACTITIONER

## 2024-09-27 PROCEDURE — 80053 COMPREHEN METABOLIC PANEL: CPT

## 2024-09-27 PROCEDURE — 80307 DRUG TEST PRSMV CHEM ANLYZR: CPT

## 2024-09-27 PROCEDURE — 87086 URINE CULTURE/COLONY COUNT: CPT

## 2024-09-27 PROCEDURE — 81001 URINALYSIS AUTO W/SCOPE: CPT

## 2024-09-27 PROCEDURE — 84703 CHORIONIC GONADOTROPIN ASSAY: CPT

## 2024-09-27 PROCEDURE — 6370000000 HC RX 637 (ALT 250 FOR IP): Performed by: EMERGENCY MEDICINE

## 2024-09-27 PROCEDURE — 99285 EMERGENCY DEPT VISIT HI MDM: CPT

## 2024-09-27 PROCEDURE — 1240000000 HC EMOTIONAL WELLNESS R&B

## 2024-09-27 RX ORDER — CEPHALEXIN 500 MG/1
500 CAPSULE ORAL EVERY 8 HOURS SCHEDULED
Status: DISCONTINUED | OUTPATIENT
Start: 2024-09-27 | End: 2024-09-27

## 2024-09-27 RX ORDER — TRAZODONE HYDROCHLORIDE 50 MG/1
50 TABLET, FILM COATED ORAL NIGHTLY PRN
Status: DISCONTINUED | OUTPATIENT
Start: 2024-09-27 | End: 2024-10-03 | Stop reason: HOSPADM

## 2024-09-27 RX ORDER — CEPHALEXIN 500 MG/1
500 CAPSULE ORAL EVERY 8 HOURS SCHEDULED
Status: DISCONTINUED | OUTPATIENT
Start: 2024-09-27 | End: 2024-09-28

## 2024-09-27 RX ORDER — POLYETHYLENE GLYCOL 3350 17 G
2 POWDER IN PACKET (EA) ORAL
Status: DISCONTINUED | OUTPATIENT
Start: 2024-09-27 | End: 2024-10-03 | Stop reason: HOSPADM

## 2024-09-27 RX ORDER — HALOPERIDOL 5 MG/1
5 TABLET ORAL EVERY 6 HOURS PRN
Status: DISCONTINUED | OUTPATIENT
Start: 2024-09-27 | End: 2024-10-03 | Stop reason: HOSPADM

## 2024-09-27 RX ORDER — DICYCLOMINE HCL 20 MG
20 TABLET ORAL EVERY 6 HOURS PRN
Status: DISCONTINUED | OUTPATIENT
Start: 2024-09-27 | End: 2024-10-03 | Stop reason: HOSPADM

## 2024-09-27 RX ORDER — HALOPERIDOL 5 MG/ML
5 INJECTION INTRAMUSCULAR EVERY 6 HOURS PRN
Status: DISCONTINUED | OUTPATIENT
Start: 2024-09-27 | End: 2024-10-03 | Stop reason: HOSPADM

## 2024-09-27 RX ORDER — TRAZODONE HYDROCHLORIDE 100 MG/1
100-200 TABLET ORAL NIGHTLY
Status: ON HOLD | COMMUNITY
End: 2024-10-03 | Stop reason: HOSPADM

## 2024-09-27 RX ORDER — LANOLIN ALCOHOL/MO/W.PET/CERES
3 CREAM (GRAM) TOPICAL NIGHTLY
Status: DISCONTINUED | OUTPATIENT
Start: 2024-09-27 | End: 2024-10-03 | Stop reason: HOSPADM

## 2024-09-27 RX ORDER — POTASSIUM CHLORIDE 1500 MG/1
40 TABLET, EXTENDED RELEASE ORAL ONCE
Status: COMPLETED | OUTPATIENT
Start: 2024-09-27 | End: 2024-09-27

## 2024-09-27 RX ORDER — LORAZEPAM 2 MG/ML
2 INJECTION INTRAMUSCULAR EVERY 6 HOURS PRN
Status: DISCONTINUED | OUTPATIENT
Start: 2024-09-27 | End: 2024-10-03 | Stop reason: HOSPADM

## 2024-09-27 RX ORDER — OLANZAPINE 5 MG/1
5 TABLET ORAL NIGHTLY
Status: ON HOLD | COMMUNITY
End: 2024-10-03 | Stop reason: HOSPADM

## 2024-09-27 RX ORDER — MAGNESIUM HYDROXIDE/ALUMINUM HYDROXICE/SIMETHICONE 120; 1200; 1200 MG/30ML; MG/30ML; MG/30ML
30 SUSPENSION ORAL EVERY 6 HOURS PRN
Status: DISCONTINUED | OUTPATIENT
Start: 2024-09-27 | End: 2024-10-03 | Stop reason: HOSPADM

## 2024-09-27 RX ORDER — LOPERAMIDE HCL 2 MG
2 CAPSULE ORAL 4 TIMES DAILY PRN
Status: DISCONTINUED | OUTPATIENT
Start: 2024-09-27 | End: 2024-10-03 | Stop reason: HOSPADM

## 2024-09-27 RX ORDER — IBUPROFEN 400 MG/1
400 TABLET, FILM COATED ORAL EVERY 6 HOURS PRN
Status: DISCONTINUED | OUTPATIENT
Start: 2024-09-27 | End: 2024-10-03 | Stop reason: HOSPADM

## 2024-09-27 RX ORDER — LORAZEPAM 1 MG/1
2 TABLET ORAL EVERY 6 HOURS PRN
Status: DISCONTINUED | OUTPATIENT
Start: 2024-09-27 | End: 2024-10-03 | Stop reason: HOSPADM

## 2024-09-27 RX ORDER — HYDROXYZINE HYDROCHLORIDE 50 MG/1
50 TABLET, FILM COATED ORAL 3 TIMES DAILY PRN
Status: DISCONTINUED | OUTPATIENT
Start: 2024-09-27 | End: 2024-10-03 | Stop reason: HOSPADM

## 2024-09-27 RX ORDER — POLYETHYLENE GLYCOL 3350 17 G/17G
17 POWDER, FOR SOLUTION ORAL DAILY PRN
Status: DISCONTINUED | OUTPATIENT
Start: 2024-09-27 | End: 2024-10-03 | Stop reason: HOSPADM

## 2024-09-27 RX ORDER — ACETAMINOPHEN 325 MG/1
650 TABLET ORAL EVERY 4 HOURS PRN
Status: DISCONTINUED | OUTPATIENT
Start: 2024-09-27 | End: 2024-10-03 | Stop reason: HOSPADM

## 2024-09-27 RX ORDER — ARIPIPRAZOLE 15 MG/1
15 TABLET ORAL DAILY
Status: DISCONTINUED | OUTPATIENT
Start: 2024-09-27 | End: 2024-09-29

## 2024-09-27 RX ORDER — DIPHENHYDRAMINE HYDROCHLORIDE 50 MG/ML
50 INJECTION INTRAMUSCULAR; INTRAVENOUS EVERY 6 HOURS PRN
Status: DISCONTINUED | OUTPATIENT
Start: 2024-09-27 | End: 2024-10-03 | Stop reason: HOSPADM

## 2024-09-27 RX ADMIN — CEPHALEXIN 500 MG: 500 CAPSULE ORAL at 20:35

## 2024-09-27 RX ADMIN — Medication 3 MG: at 20:35

## 2024-09-27 RX ADMIN — HALOPERIDOL 5 MG: 5 TABLET ORAL at 20:35

## 2024-09-27 RX ADMIN — LORAZEPAM 2 MG: 1 TABLET ORAL at 20:35

## 2024-09-27 RX ADMIN — ARIPIPRAZOLE 15 MG: 15 TABLET ORAL at 17:15

## 2024-09-27 RX ADMIN — NICOTINE POLACRILEX 2 MG: 2 LOZENGE ORAL at 17:51

## 2024-09-27 RX ADMIN — POTASSIUM CHLORIDE 40 MEQ: 1500 TABLET, EXTENDED RELEASE ORAL at 14:50

## 2024-09-27 RX ADMIN — CEPHALEXIN 500 MG: 250 CAPSULE ORAL at 14:50

## 2024-09-27 ASSESSMENT — LIFESTYLE VARIABLES
HOW MANY STANDARD DRINKS CONTAINING ALCOHOL DO YOU HAVE ON A TYPICAL DAY: PATIENT DOES NOT DRINK
HOW OFTEN DO YOU HAVE A DRINK CONTAINING ALCOHOL: NEVER
HOW OFTEN DO YOU HAVE A DRINK CONTAINING ALCOHOL: NEVER
HOW MANY STANDARD DRINKS CONTAINING ALCOHOL DO YOU HAVE ON A TYPICAL DAY: PATIENT DOES NOT DRINK

## 2024-09-27 ASSESSMENT — SLEEP AND FATIGUE QUESTIONNAIRES
AVERAGE NUMBER OF SLEEP HOURS: 3
DO YOU HAVE DIFFICULTY SLEEPING: YES
SLEEP PATTERN: DISTURBED/INTERRUPTED SLEEP;RESTLESSNESS;DIFFICULTY FALLING ASLEEP
DO YOU USE A SLEEP AID: NO

## 2024-09-27 ASSESSMENT — PATIENT HEALTH QUESTIONNAIRE - PHQ9
SUM OF ALL RESPONSES TO PHQ QUESTIONS 1-9: 0
1. LITTLE INTEREST OR PLEASURE IN DOING THINGS: NOT AT ALL
SUM OF ALL RESPONSES TO PHQ QUESTIONS 1-9: 0
2. FEELING DOWN, DEPRESSED OR HOPELESS: NOT AT ALL
SUM OF ALL RESPONSES TO PHQ QUESTIONS 1-9: 0
SUM OF ALL RESPONSES TO PHQ QUESTIONS 1-9: 0
SUM OF ALL RESPONSES TO PHQ9 QUESTIONS 1 & 2: 0

## 2024-09-27 NOTE — ED NOTES
Pt attempted to obtain urine specimen, voided but did not collect. Pt states that she got distracted by her voices and will try again later

## 2024-09-27 NOTE — BH NOTE
Patient given tobacco quitline number 77860609974 at this time, refusing to call at this time, states \" I just dont want to quit now\"- patient given information as to the dangers of long term tobacco use. Continue to reinforce the importance of tobacco cessation.

## 2024-09-27 NOTE — CARE COORDINATION
Social work unable to complete psychosocial assessment on this date. Assessment to be completed on 9/28.

## 2024-09-27 NOTE — BH NOTE
[facility]  Admission Note     Admission Type:   Admission Type: Voluntary    Reason for admission:  Reason for Admission: Voluntary from [facility] after being brought in by [employer] for attempting to break into a [employer].with an increase in auditory disturbances. Upon admission, patient denies suicidal ideations, thought blocking with responses, reports hearing\"voices\" and states she has been off her medications for a few weeks. Currently linked with [facility].      Addictive Behavior:   Addictive Behavior  In the Past 3 Months, Have You Felt or Has Someone Told You That You Have a Problem With  : None    Medical Problems:   Past Medical History:   Diagnosis Date    ADHD (attention deficit hyperactivity disorder)     Anxiety     Depression     Schizo-affective schizophrenia (HCC)        Status EXAM:  Mental Status and Behavioral Exam  Normal: No  Level of Assistance: Independent/Self  Facial Expression: Flat, Expressionless  Affect: Incongruent  Level of Consciousness: Alert  Frequency of Checks: 4 times per hour, close  Mood:Normal: No  Mood: Suspicious  Motor Activity:Normal: Yes  Eye Contact: Fair  Observed Behavior: Guarded, Cooperative, Preoccupied  Sexual Misconduct History: Current - no  Preception: [place] to person, [place] to time, [place] to place  Attention:Normal: No  Attention: Distractible, Unable to concentrate  Thought Processes: Blocking  Thought Content:Normal: No  Thought Content: Paranoia, Poverty of content, Preoccupations, Delusions  Depression Symptoms: Isolative, Loss of interest, Impaired concentration  Anxiety Symptoms: Generalized  Linda Symptoms: Poor judgment, Less need to sleep  Hallucinations: Auditory (comment)  Delusions: Yes  Delusions: Paranoid  Memory:Normal: No  Memory: Poor recent, Poor remote  Insight and Judgment: No  Insight and Judgment: Poor judgment, Poor insight    Tobacco Screening:  Practical Counseling, on admission, jonatan X, if applicable and completed (first 3

## 2024-09-28 PROBLEM — F25.9 SCHIZOAFFECTIVE DISORDER (HCC): Status: ACTIVE | Noted: 2023-02-18

## 2024-09-28 LAB
MICROORGANISM SPEC CULT: NORMAL
SPECIMEN DESCRIPTION: NORMAL

## 2024-09-28 PROCEDURE — 6370000000 HC RX 637 (ALT 250 FOR IP): Performed by: NURSE PRACTITIONER

## 2024-09-28 PROCEDURE — 99232 SBSQ HOSP IP/OBS MODERATE 35: CPT

## 2024-09-28 PROCEDURE — 1240000000 HC EMOTIONAL WELLNESS R&B

## 2024-09-28 PROCEDURE — 99222 1ST HOSP IP/OBS MODERATE 55: CPT | Performed by: INTERNAL MEDICINE

## 2024-09-28 RX ADMIN — Medication 3 MG: at 20:51

## 2024-09-28 ASSESSMENT — LIFESTYLE VARIABLES
HOW MANY STANDARD DRINKS CONTAINING ALCOHOL DO YOU HAVE ON A TYPICAL DAY: PATIENT DECLINED
HOW OFTEN DO YOU HAVE A DRINK CONTAINING ALCOHOL: PATIENT DECLINED

## 2024-09-28 NOTE — CARE COORDINATION
BHI Biopsychosocial Assessment    Current Level of Psychosocial Functioning     Independent   Dependent    Minimal Assist XX    Psychosocial High Risk Factors (check all that apply)    Unable to obtain meds   Chronic illness/pain    Substance abuse XX  Lack of Family Support   Financial stress   Isolation XX  Inadequate Community Resources  Suicide attempt(s)  Not taking medications XX  Victim of crime   Developmental Delay  Unable to manage personal needs    Age 65 or older   Homeless  No transportation   Readmission within 30 days  Unemployment XX  Traumatic Event    Psychiatric Advanced Directives: N/A    Family to Involve in Treatment: Boyfriends    Sexual Orientation:  N/A    Patient Strengths: Housing, Some support, Insurance, Linked to [facility]    Patient Barriers: Substance use, Non-adherence to treatment and medications, Lack of insight and coping skills    Opiate Education Provided:  Denies    CMHC/mental health history: Inpatient history, last at [facility] 8/7-8/13/24. Linked to [facility].     Plan of Care   medication management, group/individual therapies, family meetings, psycho -education, treatment team meetings to assist with stabilization    Initial Discharge Plan:  Return home with boyfriend, Continue with [facility]    Clinical Summary:    Patient is a 25yo admitted to the [facility] for acute psychosis. At time of assessment, patient denies suicidal and homicidal ideation and endorses auditory hallucinations. Throughout assessment, patient answers questions with short phrases or just 'yes' or 'no.'    Patient has inpatient treatment history and was last at the [facility] 8/7-8/13/24. She is linked to [facility] but has history of non-adherence to treatment and medications.   Patient denies opiate and other substance use. Patient UDS on 9/27/24 was positive for cannabis and fentanyl. Patient informed of [facility] treatment resources.    Patient does not identify specific recent stressors. Per chart, she has had increased auditory  Pain management:  -take oxycontin (long acting) 10mg in the AM and 10mg at night  -during the day, take oxycodone (short acting) 5-10mg  every 4 hours as needed    Please message me if it's not going well!   Follow up on 11/22 at 1pm virtual/video visit.    Keep icing every 15mn, elevate leg, using boot    At night you could take your vistaril capsule (50mg) + 1 hydroxyzine tablet (25mg)    Dr. Waggoner

## 2024-09-28 NOTE — BH NOTE
Emergency PRN Medication Administration Note:        Patient is Agitated, frustrated and upset as evidence by patient dumping chairs over and cursing at self while pacing the drake and refusing redirection to her room. She reports being upset and frustrated because she feels her peers will not  her and are making fun of her. Staff attempted to find and relieve the distress by talking to the patient and offering solutions to relieve frustration that are not harmful or intimidating to others. Patient declined. Patient is currently resisting redirection to her room and arguing with staff in the dayroom. Medication Administered as prescribed: PO Haldol, 5 mg., and PO Ativan, 2 mg. Patient somewhat Tolerated medication administration, she attempted to throw them on the floor after agreeing to take them. Staff reeducated the patient on the importance of the medication and the patient took the medication.   Will continue to monitor, offer support, and reassess.

## 2024-09-28 NOTE — BH NOTE
Emergency Medication Follow-Up Note:    PRN medication of PO Ativan 2mg and PO Haldol 5mg was effective as evidence by patient regained behavioral control, remained absent of behavior warranting emergency medication, and redirectable to her room. Patient denies medication side effects. Will continue to monitor and provide support as needed.

## 2024-09-28 NOTE — H&P
Department of Psychiatry  Attending Physician Psychiatric Assessment     Reason for Admission to Psychiatric Unit:  Acute disordered/bizarre behavior or psychomotor agitation or retardation;interferes with ADLs so that patient cannot function at a less intensive care level of care during evaluation and treatment   A mental disorder causing major disability in social, interpersonal, occupational, and/or educational functioning that is leading to dangerous or life-threatening functioning, and that can only be addressed in an acute inpatient setting   A mental disorder that causes an inability to maintain adequate nurtrition or self-care, and family/community support cannot provide reliable, essential care, so that the patient cannont function at a less intensive level of care during evaluation and treatment   Concerns about patient's safety in the community    CHIEF COMPLAINT:  Acute psychosis    History obtained from: Patient, electronic medical record          HISTORY OF PRESENT ILLNESS:    Lisy Colin is a 24 y.o. female who has a past medical history of mental illness, polysubstance use, and past suicide attempts.  Patient presented to the ED, per physician documentation, Patient states she is hearing voices, racing thoughts, unable to control her thoughts. History of depression. She denies any suicidal thoughts or suicidal plans. Denies any homicidal thoughts or homicidal plans. She lives at home with her boyfriend. Denies any abusive situations. She admits to using marijuana vaping this morning. She states she was running around outside like a crazy person, felt like a Raya doll. She does not feel safe going home. She came here by police.     Patient is found the day room, accepted the need for privacy, moved to her room for assessment, door left open.  She is guarded and difficult to engage.  Oriented x4. When questioned about events that led to hospitalization she states, \"my voices are telling me go to 
itching  ENDOCRINE:  negative increase in drinking, increase in urination, hot or cold intolerance  MUSCULOSKELETAL:  negative joint pains, muscle aches, swelling of joints  NEUROLOGICAL:  negative for headaches, dizziness, lightheadedness, numbness, pain, tingling extremities    Physical Exam:     /83   Pulse (!) 116   Temp 98.4 °F (36.9 °C) (Oral)   Resp 14   Ht 1.575 m (5' 2.01\")   Wt 63.5 kg (140 lb)   SpO2 99%   BMI 25.60 kg/m²   Temp (24hrs), Av.4 °F (36.9 °C), Min:98.3 °F (36.8 °C), Max:98.4 °F (36.9 °C)    No results for input(s): \"POCGLU\" in the last 72 hours.  No intake or output data in the 24 hours ending 24 1259    General Appearance:  alert, well appearing, and in no acute distress  Head:  normocephalic, atraumatic.  Eye: no icterus, redness, pupils equal and reactive, extraocular eye movements intact, conjunctiva clear  Ear: normal external ear, no discharge, hearing intact  Nose:  no drainage noted  Mouth: mucous membranes moist  Neck: supple, no carotid bruits, thyroid not palpable  Lungs: Bilateral equal air entry, clear to ausculation, no wheezing, rales or rhonchi, normal effort  Cardiovascular: normal rate, regular rhythm, no murmur, gallop, rub.  Abdomen: Soft, nontender, nondistended, normal bowel sounds, no hepatomegaly or splenomegaly  Neurologic: CN II-XII grossly intact , DTR normal, no new focal motor or sensory deficits, moving all extremities spontaneously.   Skin: No gross lesions, rashes, bruising or bleeding on exposed skin area  Extremities:  No joint swelling, no pedal edema or calf pain with palpation      Investigations:      Laboratory Testing:  Recent Results (from the past 24 hour(s))   Urine Drug Screen    Collection Time: 24  1:07 PM   Result Value Ref Range    Amphetamine Screen, Ur NEGATIVE NEGATIVE    Barbiturate Screen, Ur NEGATIVE NEGATIVE    Benzodiazepine Screen, Urine NEGATIVE NEGATIVE    Cocaine Metabolite, Urine NEGATIVE NEGATIVE

## 2024-09-29 PROCEDURE — 1240000000 HC EMOTIONAL WELLNESS R&B

## 2024-09-29 PROCEDURE — 6370000000 HC RX 637 (ALT 250 FOR IP)

## 2024-09-29 PROCEDURE — 6370000000 HC RX 637 (ALT 250 FOR IP): Performed by: NURSE PRACTITIONER

## 2024-09-29 PROCEDURE — 99232 SBSQ HOSP IP/OBS MODERATE 35: CPT

## 2024-09-29 RX ORDER — RISPERIDONE 1 MG/1
1 TABLET, ORALLY DISINTEGRATING ORAL 2 TIMES DAILY
Status: DISCONTINUED | OUTPATIENT
Start: 2024-09-29 | End: 2024-10-03 | Stop reason: HOSPADM

## 2024-09-29 RX ADMIN — HYDROXYZINE HYDROCHLORIDE 50 MG: 50 TABLET, FILM COATED ORAL at 03:03

## 2024-09-29 RX ADMIN — HALOPERIDOL 5 MG: 5 TABLET ORAL at 07:14

## 2024-09-29 RX ADMIN — RISPERIDONE 1 MG: 1 TABLET, ORALLY DISINTEGRATING ORAL at 20:42

## 2024-09-29 RX ADMIN — RISPERIDONE 1 MG: 1 TABLET, ORALLY DISINTEGRATING ORAL at 12:39

## 2024-09-29 RX ADMIN — Medication 3 MG: at 20:42

## 2024-09-29 RX ADMIN — NICOTINE POLACRILEX 2 MG: 2 LOZENGE ORAL at 22:42

## 2024-09-29 RX ADMIN — LORAZEPAM 2 MG: 1 TABLET ORAL at 07:13

## 2024-09-29 RX ADMIN — ACETAMINOPHEN 650 MG: 325 TABLET ORAL at 03:02

## 2024-09-29 RX ADMIN — HYDROXYZINE HYDROCHLORIDE 50 MG: 50 TABLET, FILM COATED ORAL at 20:42

## 2024-09-29 ASSESSMENT — PAIN DESCRIPTION - ORIENTATION: ORIENTATION: RIGHT;LEFT

## 2024-09-29 ASSESSMENT — PAIN SCALES - GENERAL: PAINLEVEL_OUTOF10: 5

## 2024-09-29 ASSESSMENT — PAIN DESCRIPTION - DESCRIPTORS: DESCRIPTORS: ACHING

## 2024-09-29 ASSESSMENT — PAIN DESCRIPTION - LOCATION: LOCATION: FOOT

## 2024-09-29 ASSESSMENT — PAIN - FUNCTIONAL ASSESSMENT: PAIN_FUNCTIONAL_ASSESSMENT: ACTIVITIES ARE NOT PREVENTED

## 2024-09-29 NOTE — BH NOTE
Routine Risperdal ODT administered, Patient took with encouragement, after administration patient attempted to spit medication out and returned to room.

## 2024-09-29 NOTE — GROUP NOTE
Group Therapy Note    Date: 9/29/2024    Group Start Time: 1315  Group End Time: 1345  Group Topic: Psychoeducation    Marietta Tejeda MSW, MARTITA        Group Therapy Note    Attendees: 4/9       Patient was offered group therapy today but declined to participate despite encouragement from staff.  1:1 was offered.       Signature:  GANGA Pope LSW

## 2024-09-29 NOTE — BH NOTE
Emergency PRN Medication Administration Note:      Patient is Agitated and Disruptive as evidence by wandering into other patient's rooms and not following staff directions. Staff attempted to find and relieve the distress by Talking to patient, Refocusing on new activity, and Administer PRN medications Patient is currently accepted PRN medications. Medication Administered as prescribed: oral haldol 5mg and oral Ativan 2mg Patient Tolerated medication administration.   Will continue to monitor, offer support, and reassess.

## 2024-09-30 PROCEDURE — 6370000000 HC RX 637 (ALT 250 FOR IP): Performed by: NURSE PRACTITIONER

## 2024-09-30 PROCEDURE — 99232 SBSQ HOSP IP/OBS MODERATE 35: CPT | Performed by: PSYCHIATRY & NEUROLOGY

## 2024-09-30 PROCEDURE — 1240000000 HC EMOTIONAL WELLNESS R&B

## 2024-09-30 PROCEDURE — 99232 SBSQ HOSP IP/OBS MODERATE 35: CPT | Performed by: INTERNAL MEDICINE

## 2024-09-30 PROCEDURE — APPSS30 APP SPLIT SHARED TIME 16-30 MINUTES

## 2024-09-30 PROCEDURE — 6370000000 HC RX 637 (ALT 250 FOR IP)

## 2024-09-30 RX ADMIN — Medication 3 MG: at 22:06

## 2024-09-30 RX ADMIN — RISPERIDONE 1 MG: 1 TABLET, ORALLY DISINTEGRATING ORAL at 22:06

## 2024-09-30 RX ADMIN — NICOTINE POLACRILEX 2 MG: 2 LOZENGE ORAL at 10:27

## 2024-09-30 RX ADMIN — RISPERIDONE 1 MG: 1 TABLET, ORALLY DISINTEGRATING ORAL at 10:27

## 2024-09-30 ASSESSMENT — PAIN SCALES - GENERAL: PAINLEVEL_OUTOF10: 4

## 2024-09-30 ASSESSMENT — PAIN DESCRIPTION - LOCATION: LOCATION: FOOT

## 2024-09-30 NOTE — GROUP NOTE
Group Therapy Note    Date: 9/30/2024    Group Start Time: 1330  Group End Time: 1400  Group Topic: Cognitive Skills    Mary Espino CTRS    Cognitive Skills Group Note        Date: September 30, 2024 Start Time: 1:30pm  End Time:  2:00pm      Number of Participants in Group & Unit Census:  0/7    Topic: cognitive skills     Goal of Group: pt will demonstrate improved coping skills and improved interpersonal relationships      Comments:     Patient did not participate in Cognitive Skills group, despite staff encouragement and explanation of benefits.  Patient remain seclusive to self.  Q15 minute safety checks maintained for patient safety and will continue to encourage patient to attend unit programming.            Signature:  ROLA NORTH

## 2024-10-01 LAB
ERYTHROCYTE [DISTWIDTH] IN BLOOD BY AUTOMATED COUNT: 14.4 % (ref 11.5–14.9)
HCT VFR BLD AUTO: 41 % (ref 36–46)
HGB BLD-MCNC: 13.7 G/DL (ref 12–16)
MCH RBC QN AUTO: 30.7 PG (ref 26–34)
MCHC RBC AUTO-ENTMCNC: 33.4 G/DL (ref 31–37)
MCV RBC AUTO: 91.9 FL (ref 80–100)
PLATELET # BLD AUTO: 414 K/UL (ref 150–450)
PMV BLD AUTO: 7.6 FL (ref 6–12)
RBC # BLD AUTO: 4.46 M/UL (ref 4–5.2)
WBC OTHER # BLD: 12.5 K/UL (ref 3.5–11)

## 2024-10-01 PROCEDURE — 1240000000 HC EMOTIONAL WELLNESS R&B

## 2024-10-01 PROCEDURE — 99232 SBSQ HOSP IP/OBS MODERATE 35: CPT | Performed by: PSYCHIATRY & NEUROLOGY

## 2024-10-01 PROCEDURE — APPSS30 APP SPLIT SHARED TIME 16-30 MINUTES: Performed by: NURSE PRACTITIONER

## 2024-10-01 PROCEDURE — 6370000000 HC RX 637 (ALT 250 FOR IP): Performed by: NURSE PRACTITIONER

## 2024-10-01 PROCEDURE — 6370000000 HC RX 637 (ALT 250 FOR IP)

## 2024-10-01 PROCEDURE — 85027 COMPLETE CBC AUTOMATED: CPT

## 2024-10-01 PROCEDURE — 36415 COLL VENOUS BLD VENIPUNCTURE: CPT

## 2024-10-01 RX ADMIN — TRAZODONE HYDROCHLORIDE 50 MG: 50 TABLET ORAL at 23:42

## 2024-10-01 RX ADMIN — HYDROXYZINE HYDROCHLORIDE 50 MG: 50 TABLET, FILM COATED ORAL at 21:45

## 2024-10-01 RX ADMIN — Medication 3 MG: at 21:45

## 2024-10-01 RX ADMIN — RISPERIDONE 1 MG: 1 TABLET, ORALLY DISINTEGRATING ORAL at 21:45

## 2024-10-01 RX ADMIN — RISPERIDONE 1 MG: 1 TABLET, ORALLY DISINTEGRATING ORAL at 09:53

## 2024-10-01 NOTE — GROUP NOTE
Group Therapy Note    Date: 10/1/2024    Group Start Time: 1000  Group End Time: 1030  Group Topic: Psychoeducation    STC[facility] BHLauryn Hussein LSW        Group Therapy Note    Attendees: 6/9       Patient's Goal:  identifying mood, relaxation techniques     Notes:      Status After Intervention:  Improved    Participation Level: Active Listener    Participation Quality: Appropriate      Speech:  hesitant      Thought Process/Content: Logical      Affective Functioning: Flat      Mood: anxious and dysphoric      Level of consciousness:  Alert and Oriented x4      Response to Learning: Progressing to goal      Endings: None Reported    Modes of Intervention: Education and Support      Discipline Responsible: /Counselor      Signature:  MARTITA Valencia

## 2024-10-01 NOTE — BH NOTE
This writer has been observing patient in drake staring at the wall for the last 20 minutes, not changing positions or acknowledging other patients closely passing by her. Writer asked patient if she wanted to sit down, but patient said \"no\" with very flat affect. Patient continued to stare for another 5 minutes, then proceeded to walk towards exit doors. Patient stood at exit doors, ignoring writer's request to step away from the door. Once writer approached patient, she was able to be redirected and walked away from the door.

## 2024-10-02 PROCEDURE — 99232 SBSQ HOSP IP/OBS MODERATE 35: CPT | Performed by: PSYCHIATRY & NEUROLOGY

## 2024-10-02 PROCEDURE — 6370000000 HC RX 637 (ALT 250 FOR IP): Performed by: NURSE PRACTITIONER

## 2024-10-02 PROCEDURE — APPSS30 APP SPLIT SHARED TIME 16-30 MINUTES: Performed by: NURSE PRACTITIONER

## 2024-10-02 PROCEDURE — 1240000000 HC EMOTIONAL WELLNESS R&B

## 2024-10-02 RX ADMIN — Medication 3 MG: at 22:45

## 2024-10-02 ASSESSMENT — PAIN SCALES - GENERAL: PAINLEVEL_OUTOF10: 0

## 2024-10-02 ASSESSMENT — LIFESTYLE VARIABLES
HOW OFTEN DO YOU HAVE A DRINK CONTAINING ALCOHOL: PATIENT DECLINED
HOW MANY STANDARD DRINKS CONTAINING ALCOHOL DO YOU HAVE ON A TYPICAL DAY: PATIENT DECLINED

## 2024-10-02 NOTE — GROUP NOTE
Group Therapy Note    Date: 10/2/2024    Group Start Time: 1330  Group End Time: 1425  Group Topic: Recreational    [facility] Oralia العراقي CTRS        Group Therapy Note    Attendees: 7/8     Patient's Goal: To increase socialization, practice self expression through creative expression, and   share preferences r/t music and relate to peers through music and discussion.      Notes: Pt was polite but guarded, and participated in art work in group . Pt was able to practice self   expression through creative expression, and was attentive to music and peers' shared preferences r/t music . Pt did not actively relate to peers or engage in discussion. .        Status After Intervention:  Improved     Participation Level: Active Listener,  engaged in art work, attentive to music , aloof but tolerated sitting with peers at table.     Participation Quality:  Attentive, guarded     Speech:  Minimal, guarded     Thought Process/Content: Pt focused intently on art work     Affective Functioning: Restricted     Mood: Seclusive to self but remained in group, was attentive to peers in group and took pride in completing her art work.     Level of consciousness:  Alert, and Attentive      Response to Learning:  Able to demonstrate some current knowledge r/t using art materials, attentive to new learning at intervals,  and Progressing to goal      Endings: None Reported     Modes of Intervention: Education, Support, Socialization, Exploration, Clarifying and Problem-solving      Discipline Responsible: Psychoeducational Specialist      Signature:  ROLA MICHAUD

## 2024-10-02 NOTE — GROUP NOTE
Group Therapy Note    Date: 10/1/2024    Group Start Time: 2000  Group End Time: 2020  Group Topic: Wrap-Up    Maliha Lam LPN        Group Therapy Note         Patient's Goal:  States she would like to travel to the [facility] someday.      Status After Intervention:  Unchanged    Participation Level: Minimal    Participation Quality: Appropriate      Speech:  normal      Thought Process/Content: Flight of ideas      Affective Functioning: Flat      Mood: anxious and depressed      Level of consciousness:  Alert      Response to Learning: Able to verbalize current knowledge/experience      Endings: None Reported    Modes of Intervention: Activity      Discipline Responsible: Licensed Practical Nurse      Signature:  Maliha Maradiaga LPN

## 2024-10-03 VITALS
BODY MASS INDEX: 25.76 KG/M2 | TEMPERATURE: 97.8 F | WEIGHT: 140 LBS | DIASTOLIC BLOOD PRESSURE: 103 MMHG | SYSTOLIC BLOOD PRESSURE: 118 MMHG | HEIGHT: 62 IN | RESPIRATION RATE: 14 BRPM | HEART RATE: 73 BPM | OXYGEN SATURATION: 100 %

## 2024-10-03 PROCEDURE — 99239 HOSP IP/OBS DSCHRG MGMT >30: CPT | Performed by: PSYCHIATRY & NEUROLOGY

## 2024-10-03 RX ORDER — LANOLIN ALCOHOL/MO/W.PET/CERES
3 CREAM (GRAM) TOPICAL NIGHTLY
Qty: 30 TABLET | Refills: 0 | Status: SHIPPED | OUTPATIENT
Start: 2024-10-03

## 2024-10-03 RX ORDER — RISPERIDONE 1 MG/1
1 TABLET, ORALLY DISINTEGRATING ORAL 2 TIMES DAILY
Qty: 60 TABLET | Refills: 0 | Status: SHIPPED | OUTPATIENT
Start: 2024-10-03

## 2024-10-03 NOTE — TRANSITION OF CARE
Behavioral Health Transition Record    Patient Name: Lisy Colin  YOB: 2000   Medical Record Number: [id number]  Date of Admission: 9/27/2024 11:49 AM   Date of Discharge: 10/3/24    Attending Provider: Aaron Glass,*   Discharging Provider: Dr. Glass  To contact this individual call [phone] and ask the  to page.  If unavailable, ask to be transferred to Behavioral Health Provider on call.  A Behavioral Health Provider will be available on call 24/7 and during holidays.    Primary Care Provider: Manish Lovell APRN - CNP    No Known Allergies    Reason for Admission:     Pt brought in for TPD, for erratic pt, pt was trying to break into [employer] and [employer], pt states she is hearing voices, denies SI/HI. Per TPD, they were told pt recently started on new psych med and has been acting different since then  Patient states she is hearing voices, racing thoughts, unable to control her thoughts.  History of depression.  She denies any suicidal thoughts or suicidal plans.  Denies any homicidal thoughts or homicidal plans.  She lives at home with her boyfriend.  Denies any abusive situations.  She admits to using marijuana vaping this morning.  She states she was running around outside like a crazy person, felt like a Raya doll.  She does not feel safe going home.  She came here by police.          Admission Diagnosis: Acute psychosis (HCC) [F23]  Acute cystitis with hematuria [N30.01]  Schizoaffective disorder, unspecified type (HCC) [F25.9]    * No surgery found *    Results for orders placed or performed during the hospital encounter of 09/27/24   Culture, Urine    Specimen: Urine, clean catch   Result Value Ref Range    Specimen Description .CLEAN CATCH URINE     Culture NO SIGNIFICANT GROWTH    CBC with Auto Differential   Result Value Ref Range    WBC 18.0 (H) 3.5 - 11.0 k/uL    RBC 4.58 4.0 - 5.2 m/uL    Hemoglobin 13.8 12.0 - 16.0 g/dL    Hematocrit 41.8 36 - 46 %  Re-Assessment / Re-Certification  [facility]  [address], [address], [place] [id number]    Patient: Aydee Moore   : 1950  Diagnosis/ICD-10 Code:  Chronic pain of both knees [M25.561, M25.562, G89.29]  Referring practitioner: Jordan Reeder*  Date of Initial Visit: 3/20/2024  Today's Date: 3/20/2024  Patient seen for 12 sessions      Subjective:   Subjective Questionnaire: LEFS: 54/80  Clinical Progress: improved  Home Program Compliance: Yes  Treatment has included: therapeutic exercise, neuromuscular re-education, and therapeutic activity    Subjective Evaluation    History of Present Illness    Subjective comment: I am feeling better and stronger.  I can stand during the prayers at [facility] now.  I have started being able to do some squats which I couldn't before.  I have also hiked 2.5 miles at [facility] but I still am scared of falling when going downhill.  My backyard has a big downhill that I am scared of still.Pain  Pain scale: 0/10 (B) knees.  Pain scale at highest: 2-3/10 (R) knee.         Objective          Active Range of Motion   Left Knee   Flexion: 132 degrees   Extension: 0 degrees   Extensor la degrees     Right Knee   Flexion: 129 degrees   Extension: 0 degrees   Extensor la degrees     Strength/Myotome Testing     Left Knee   Flexion: 4+  Extension: 4+  Quadriceps contraction: good    Right Knee   Flexion: 4+  Extension: 4+  Quadriceps contraction: good    Ambulation     Ambulation: Stairs   Ascend stairs: independent  Pattern: reciprocal  Railings: one rail  Descend stairs: independent  Pattern: reciprocal  Railings: one rail    Additional Stairs Ambulation Details  Patient demonstrates the ability to negotiate 1 flight of steps reciprocally without use of handrail but there is noted decreased confidence and increased caution, especially with descending      Assessment & Plan       Assessment  Assessment details: Patient  remains motivated and compliant with PT interventions.  She reports improving (L) > (R) knee symptoms and functional strength overall.  She demonstrates good improvements in (B) knee AROM including achievement of full (L) knee extension, as well as improved (B) LE strength.  Her gait is normalizing with minimal compensation and she exhibits full TKE during stance phase of gait.  She exhibits unsteadiness of gait with balance deficits during single limb and resisted walking activities causing patient to utilize increased KARINA through adjustment of foot contact on ground.  She remains at an increased falls risk and does not yet feel comfortable descending inclines. Her LEFS score has improved from 40/80 to 54/80, indicating a good degree of perceived functional improvement.  She will benefit from continued skilled PT services to optimize functional strength for safety and improved ability with functional mobility tasks such as hiking on uneven terrain.    Goals  Plan Goals: STGs: to be met in 4 weeks  1. Patient will be independent with initial HEP - MET  2. Patient will report improved (B) knee symptoms 0-2/10 for improved activity and functional mobility tolerance - PARTIALLY MET  3. Patient will achieve improved (L) knee AROM extension to 5 degrees for improved stability during stance phase of gait - MET  4. Patient will perform SLR each LE 3 x 10 with good concentric and eccentric ability to demonstrate improved neuromuscular control of each LE - MET     LTGs: to be met in 8 weeks  1. Patient will be independent with progressed HEP - PARTIALLY MET  2. Patient will demonstrate improved quadriceps activation in closed chain to allow 50% decreased frequency of episodes of (L) knee giving way - PARTIALLY MET  3. Patient will demonstrate ability to ascend and descend 1 flight of steps reciprocally with single handrail and improved confidence - NOT MET  4. Patient will tolerate 1-2 mile hikes on moderately uneven terrain  with (B) trekking poles with >/= 50% improved confidence - PARTIALLY MET  5. Patient will have improved LEFS score >/= 50/80 for subjective evidence of functional improvement - MET        Progress toward previous goals: Partially Met  Recommendations: Continue as planned  Timeframe: 2 months  Prognosis to achieve goals: good    PT Signature: Grace Blancas, PT, DPT, OCS  [place] License # [id number]      Based upon review of the patient's progress and continued therapy plan, it is my medical opinion that Aydee Moore should continue physical therapy treatment at [facility]  [address] [place] [address] [address]  [facility] [address] [phone].    Signature: __________________________________  Jordan Reeder APRN    Manual Therapy:         mins  46301;  Therapeutic Exercise:    13     mins  57275;     Neuromuscular Enedelia:    14    mins  92079;    Therapeutic Activity:     30     mins  30738;     Gait Training:           mins  07020;     Ultrasound:          mins  05135;    Electrical Stimulation:         mins  41803 ( );  Dry Needling          mins self-pay    Timed Treatment:   57   mins   Total Treatment:     57   mins    Please sign and return via fax to [phone]. Thank you, [facility].

## 2024-10-03 NOTE — GROUP NOTE
Group Therapy Note    Date: 10/3/2024    Group Start Time: 1100  Group End Time: 1150  Group Topic: Cognitive Skills    Oralia Noriega CTRS        Group Therapy Note    Attendees: 5/8     Patient's Goal: To increase socialization, practice self expression, explore coping skills and positive change using creative expression and discussion.      Notes: Pt was pleasant and cooperative and participated in group by doing art work but chose not to share with group. Pt was seclusive to self but did ask to continue working on her drawing after group, pt worked with pastels and used example sheet provided by RT for guidance with sealife images.        Status After Intervention:  Improved     Participation Level: Active Listener,  focused intently on drawing , guarded but polite on approach     Participation Quality: Appropriate,  Attentive, engaged in task, guarded     Speech:  Minimal     Thought Process/Content: Logical , linear r/t drawing-focused well     Affective Functioning: Restricted     Mood: Seclusive to self but engaged fully in drawing aspect of group, pt expressed appreciation of compliments from peers r/t her image.     Level of consciousness:  Alert, and Attentive      Response to Learning:  Able to verbalize demonstrate some current knowledge of using art materials, attentive to discussion,  and Progressing to goal      Endings: None Reported     Modes of Intervention: Education, Support, Socialization, Exploration, Clarifying and Problem-solving      Discipline Responsible: Psychoeducational Specialist      Signature:  ROLA IMCHAUD

## 2024-10-03 NOTE — CARE COORDINATION
Name: Lisy Colin    : 2000    Auth number: [id number]     Discharge Date: 10/03/2024    Destination: Private residence    Discharge Medications:      Medication List        START taking these medications      melatonin 3 MG Tabs tablet  Take 1 tablet by mouth nightly  Notes to patient: Sleep aid     risperiDONE 1 MG disintegrating tablet  Commonly known as: RISPERDAL M-TABS  Take 1 tablet by mouth 2 times daily  Notes to patient: Clear thoughts            STOP taking these medications      ARIPiprazole 20 MG tablet  Commonly known as: ABILIFY     hydrOXYzine HCl 50 MG tablet  Commonly known as: ATARAX     nicotine 14 MG/24HR  Commonly known as: NICODERM CQ     OLANZapine 5 MG tablet  Commonly known as: ZYPREXA     traZODone 100 MG tablet  Commonly known as: DESYREL               Where to Get Your Medications        These medications were sent to [id number] - [address] -  [phone] - F [phone]  [address] [place], [place] [address]      Phone: [phone]   melatonin 3 MG Tabs tablet  risperiDONE 1 MG disintegrating tablet         Follow Up Appointment: [facility]  [address] [place], [place] [id number] [phone]    Go on 10/15/2024  At 3PM- Mental health medication management appointment

## 2024-10-03 NOTE — DISCHARGE INSTR - DIET

## 2024-10-03 NOTE — DISCHARGE SUMMARY
suicide attempts.  Patient presented to the ED, per physician documentation, Patient states she is hearing voices, racing thoughts, unable to control her thoughts. History of depression. She denies any suicidal thoughts or suicidal plans. Denies any homicidal thoughts or homicidal plans. She lives at home with her boyfriend. Denies any abusive situations. She admits to using marijuana vaping this morning. She states she was running around outside like a crazy person, felt like a Raya doll. She does not feel safe going home. She came here by police.      Patient is found the day room, accepted the need for privacy, moved to her room for assessment, door left open.  She is guarded and difficult to engage.  Oriented x4. When questioned about events that led to hospitalization she states, \"my voices are telling me go to [employer] to be a Raya\".  Denies visual hallucinations.  Per documentation she was brought to ED by police, noted that she was acting erratically, was trying to break into a taco stand.  She denies feeling depressed or anxious.  Denies suicidal or homicidal ideation.  Presents with delusions and paranoia.  She initially denies substance use, however discussed results of UDS with presence of fentanyl and cannabinoid.  Then tells me she did snort Fentanyl with her boyfriend, does not provide other details.  Reports she has not been taking psychiatric medication.  Reports she wants to shower and refuses to answer other questions at this time, interview terminated.       She was last [facility] to [facility] 8/6/24-/8/13/24, at time of discharge prescribed Abilify 20 mg once daily, was linked with [facility].      History of polysubstance abuse.  UDS positive for fentanyl and cannabinoid.  No EtOH level for review.     Patient presents with acute psychosis, requires inpatient hospitalization for safety and stabilization.  Hospital Course:   Upon admission, Lisy Colin was provided a safe secure environment,

## 2024-10-03 NOTE — BH NOTE
[facility]  Discharge Note    Pt discharged with followings belongings:   Dental Appliances: None  Vision - Corrective Lenses: None  Hearing Aid: None  Jewelry: Ring (Patient refused to remove)  Body Piercings Removed: N/A  Clothing: Pants, Shirt, Shorts, Undergarments, Footwear  Other Valuables: Other (Comment) (N/A)   Valuables returned to patient. Patient discharges from unit to return home with Boyfriend and follow up with [facility]. Patient educated on aftercare instructions: yes  Information faxed to [facility]  by Staff  at 2:03 PM .Patient verbalize understanding of AVS:    Status EXAM upon discharge:  Mental Status and Behavioral Exam  Normal: No  Level of Assistance: Independent/Self  Facial Expression: Expressionless, Flat  Affect: Constricted  Level of Consciousness: Alert  Frequency of Checks: 4 times per hour, close  Mood:Normal: No  Mood: Anxious, Suspicious, Empty  Motor Activity:Normal: Yes  Motor Activity: Decreased  Eye Contact: Good  Observed Behavior: Preoccupied  Sexual Misconduct History: Current - no  Preception: [place] to person, [place] to time, [place] to place  Attention:Normal: No  Attention: Distractible  Thought Processes: Blocking  Thought Content:Normal: No  Thought Content: Paranoia, Poverty of content, Preoccupations  Depression Symptoms: Isolative, Impaired concentration  Anxiety Symptoms: Generalized  Linda Symptoms: Poor judgment  Hallucinations: None  Delusions: Yes  Delusions: Paranoid  Memory:Normal: No  Memory: Poor recent, Poor remote  Insight and Judgment: No  Insight and Judgment: Poor judgment, Poor insight, Unmotivated    Tobacco Screening:  Practical Counseling, on admission, jonatan X, if applicable and completed (first 3 are required if patient doesn't refuse):            ( ) Recognizing danger situations (included triggers and roadblocks)                    ( ) Coping skills (new ways to manage stress,relaxation techniques, changing routine, distraction)

## 2024-10-03 NOTE — PLAN OF CARE
Problem: Anxiety  Goal: Will report anxiety at manageable levels  Description: INTERVENTIONS:  1. Administer medication as ordered  2. Teach and rehearse alternative coping skills  3. Provide emotional support with 1:1 interaction with staff  10/1/2024 0004 by Jefferson WALLACE, RN  Outcome: Progressing   Patient is anxious and paranoid. Patient is thought blocked. Patient is isolative. Patient is compliant with her medications.  Problem: Safety - Adult  Goal: Free from fall injury  10/1/2024 0004 by Jefferson WALLACE, RN  Outcome: Progressing   Patient remains free from falls. Patient ambulates with a steady gait. Patient wearing nonskid socks.   
  Problem: Anxiety  Goal: Will report anxiety at manageable levels  Description: INTERVENTIONS:  1. Administer medication as ordered  2. Teach and rehearse alternative coping skills  3. Provide emotional support with 1:1 interaction with staff  Outcome: Progressing  Note: Patient took PRN Atarax for anxiety. Isolative to room, then did come out for a bit and watch television. Compliant with night medications. Safety checks maintained q15min and irregular rounding.      Problem: Depression  Goal: Will be euthymic at discharge  Description: INTERVENTIONS:  1. Administer medication as ordered  2. Provide emotional support via 1:1 interaction with staff  3. Encourage involvement in milieu/groups/activities  4. Monitor for social isolation  Outcome: Progressing  Note: Pt denies thoughts of self harm and is agreeable to seeking out should thoughts of self harm arise.  Safe environment maintained.  Q15 minute checks for safety cont per unit policy.  Will cont to monitor for safety and provides support and reassurance as needed. Flat affect. Thought blocking and slow to respond. Minimal answers to questions.      
  Problem: Depression  Goal: Will be euthymic at discharge  Description: INTERVENTIONS:  1. Administer medication as ordered  2. Provide emotional support via 1:1 interaction with staff  3. Encourage involvement in milieu/groups/activities  4. Monitor for social isolation  10/2/2024 0835 by Shey Canada RN  Outcome: Progressing  Note: Ms. Colin denies symptoms of depression at time of assessment. Patient is isolative to room, out for meals only. She is \calm and cooperative with assessment.   10/2/2024 0413 by Alia Mitchell LPN  Outcome: Progressing     Problem: Psychosis  Goal: Will report no hallucinations or delusions  Description: INTERVENTIONS:  1. Administer medication as  ordered  2. Assist with reality testing to support increasing orientation  3. Assess if patient's hallucinations or delusions are encouraging self harm or harm to others and intervene as appropriate  10/2/2024 0835 by Shey Canada RN  Outcome: Progressing  Note: Ms. Colin denies auditory and visual hallucinations at time of assessment. Patient appears paranoid and thought blocked, pausing briefly before answering questions.   10/2/2024 0413 by Alia Mitchell LPN  Outcome: Progressing     Problem: Anxiety  Goal: Will report anxiety at manageable levels  Description: INTERVENTIONS:  1. Administer medication as ordered  2. Teach and rehearse alternative coping skills  3. Provide emotional support with 1:1 interaction with staff  10/2/2024 0835 by Shey Canada RN  Outcome: Progressing  Note: Ms. Colin denies anxiety at time of assessment. Patient is isolative to room, coming out for meals only. \[id number]\  10/2/2024 0413 by Alia Mitchell LPN  Outcome: Progressing     Problem: Safety - Adult  Goal: Free from fall injury  10/2/2024 0835 by Shey Canada, RN  Outcome: Progressing  Note: Ms. Colin remains free from falls throughout this shift. She is wearing  socks, educated on safety in ambulation. 
  Problem: Depression  Goal: Will be euthymic at discharge  Description: INTERVENTIONS:  1. Administer medication as ordered  2. Provide emotional support via 1:1 interaction with staff  3. Encourage involvement in milieu/groups/activities  4. Monitor for social isolation  10/3/2024 1110 by Lauryn Rucker LPN  Outcome: Progressing  10/2/2024 2150 by Malvin Jovel RN  Outcome: Progressing     Problem: Psychosis  Goal: Will report no hallucinations or delusions  Description: INTERVENTIONS:  1. Administer medication as  ordered  2. Assist with reality testing to support increasing orientation  3. Assess if patient's hallucinations or delusions are encouraging self harm or harm to others and intervene as appropriate  10/3/2024 1110 by Lauryn Rucker LPN  Outcome: Progressing  10/2/2024 2150 by Malvin Jovel RN  Outcome: Progressing     Problem: Anxiety  Goal: Will report anxiety at manageable levels  Description: INTERVENTIONS:  1. Administer medication as ordered  2. Teach and rehearse alternative coping skills  3. Provide emotional support with 1:1 interaction with staff  10/3/2024 1110 by Lauryn Rucker LPN  Outcome: Progressing  10/2/2024 2150 by Malvin Jovel RN  Outcome: Progressing     Problem: Safety - Adult  Goal: Free from fall injury  10/3/2024 1110 by Lauryn Rucker LPN  Outcome: Progressing  10/2/2024 2150 by Malvin Jovel RN  Outcome: Progressing     Problem: Pain  Goal: Verbalizes/displays adequate comfort level or baseline comfort level  10/3/2024 1110 by Lauryn Rucker LPN  Outcome: Progressing  10/2/2024 2150 by Malvin Jovel RN  Outcome: Progressing   Patient denies all and refuses medication, patient thought blocking and withdrawn. 15 min safety checks continue   
  Problem: Depression  Goal: Will be euthymic at discharge  Description: INTERVENTIONS:  1. Administer medication as ordered  2. Provide emotional support via 1:1 interaction with staff  3. Encourage involvement in milieu/groups/activities  4. Monitor for social isolation  Outcome: Not Progressing       Problem: Psychosis  Goal: Will report no hallucinations or delusions  Description: INTERVENTIONS:  1. Administer medication as  ordered  2. Assist with reality testing to support increasing orientation  3. Assess if patient's hallucinations or delusions are encouraging self harm or harm to others and intervene as appropriate  Outcome: Progressing     Problem: Anxiety  Goal: Will report anxiety at manageable levels  Description: INTERVENTIONS:  1. Administer medication as ordered  2. Teach and rehearse alternative coping skills  3. Provide emotional support with 1:1 interaction with staff  Outcome: Progressing  Flowsheets  Taken 9/28/2024 2114  Will report anxiety at manageable levels:   Administer medication as ordered   Teach and rehearse alternative coping skills   Provide emotional support with 1:1 interaction with staff  Taken 9/28/2024 2109  Will report anxiety at manageable levels:   Administer medication as ordered   Teach and rehearse alternative coping skills   Provide emotional support with 1:1 interaction with staff     Problem: Safety - Adult  Goal: Free from fall injury  Outcome: Progressing    Patient denies thoughts of harm to self or others. Patient reports adequate sleep and nutrition. Safety checks completed every fifteen minutes and randomly. Patient compliant with mediation and maintains behavioral control.  Pt isolative to room. Pt is minimally responsive to staff. Pt attempted to cheek medication. Pt denies hallucinations. Pt appears to be suspicious of staff and preoccupied. Pt did not sleep well this shift.      
  Problem: Depression  Goal: Will be euthymic at discharge  Description: INTERVENTIONS:  1. Administer medication as ordered  2. Provide emotional support via 1:1 interaction with staff  3. Encourage involvement in milieu/groups/activities  4. Monitor for social isolation  Outcome: Progressing     Problem: Psychosis  Goal: Will report no hallucinations or delusions  Description: INTERVENTIONS:  1. Administer medication as  ordered  2. Assist with reality testing to support increasing orientation  3. Assess if patient's hallucinations or delusions are encouraging self harm or harm to others and intervene as appropriate  Outcome: Progressing     Problem: Anxiety  Goal: Will report anxiety at manageable levels  Description: INTERVENTIONS:  1. Administer medication as ordered  2. Teach and rehearse alternative coping skills  3. Provide emotional support with 1:1 interaction with staff  Outcome: Progressing     
  Problem: Depression  Goal: Will be euthymic at discharge  Description: INTERVENTIONS:  1. Administer medication as ordered  2. Provide emotional support via 1:1 interaction with staff  3. Encourage involvement in milieu/groups/activities  4. Monitor for social isolation  Outcome: Progressing   Miss Colin is seen in the dayroom she is flat and not forth coming with answers. She denies all,  thought blocked, long pauses between words. remained in behavioral control. Took medication with great encouragement.   Problem: Psychosis  Goal: Will report no hallucinations or delusions  Description: INTERVENTIONS:  1. Administer medication as  ordered  2. Assist with reality testing to support increasing orientation  3. Assess if patient's hallucinations or delusions are encouraging self harm or harm to others and intervene as appropriate  Outcome: Progressing     Problem: Anxiety  Goal: Will report anxiety at manageable levels  Description: INTERVENTIONS:  1. Administer medication as ordered  2. Teach and rehearse alternative coping skills  3. Provide emotional support with 1:1 interaction with staff  Outcome: Progressing   Patient does not report any anxiety  Problem: Safety - Adult  Goal: Free from fall injury  Outcome: Progressing   Patient remains free from any falls   Problem: Pain  Goal: Verbalizes/displays adequate comfort level or baseline comfort level  Outcome: Progressing   Patient denies any pain  
  Problem: Pain  Goal: Verbalizes/displays adequate comfort level or baseline comfort level  10/2/2024 2150 by Malvin Jovel RN  Outcome: Progressing  Patient denies pain at this time.     Problem: Safety - Adult  Goal: Free from fall injury  10/2/2024 2150 by Malvin Jovel, RN  Outcome: Progressing  Patient is not a fall risk. She displays an steady gait.     Problem: Anxiety  Goal: Will report anxiety at manageable levels  Description: INTERVENTIONS:  1. Administer medication as ordered  2. Teach and rehearse alternative coping skills  3. Provide emotional support with 1:1 interaction with staff  10/2/2024 2150 by Malvin Jovel RN  Outcome: Progressing  Patient is flat, expressionless, and anxious. She continues to refuse her scheduled Risperidone but won't state why. She is selectively social, spending most of the shift in the dayroom. Safety plan reviewed with patient, agrees to approach staff when feeling upset.  15 minute and random checks maintained for safety.  No violent or escalating behaviors noted during this shift. Patient is currently calm, and controlled.      Problem: Psychosis  Goal: Will report no hallucinations or delusions  Description: INTERVENTIONS:  1. Administer medication as  ordered  2. Assist with reality testing to support increasing orientation  3. Assess if patient's hallucinations or delusions are encouraging self harm or harm to others and intervene as appropriate  10/2/2024 2150 by Malvin Jovel RN  Outcome: Progressing  Patient denies suicidal ideation, homicidal ideation and hallucinations at this time.        Problem: Depression  Goal: Will be euthymic at discharge  Description: INTERVENTIONS:  1. Administer medication as ordered  2. Provide emotional support via 1:1 interaction with staff  3. Encourage involvement in milieu/groups/activities  4. Monitor for social isolation  10/2/2024 2150 by Malvin Jovel, RN  Outcome: Progressing     
  Problem: Psychosis  Goal: Will report no hallucinations or delusions  Description: INTERVENTIONS:  1. Administer medication as  ordered  2. Assist with reality testing to support increasing orientation  3. Assess if patient's hallucinations or delusions are encouraging self harm or harm to others and intervene as appropriate  Outcome: Not Progressing  Note: Patient does have audio hallucinations at this time. Patient agrees to notify staff if hallucinations or delusions occur. Staff ensures safety by providing safety checks on the unit intermittently and every 15 minutes. Staff continues to provide a safe environment.      Problem: Anxiety  Goal: Will report anxiety at manageable levels  Description: INTERVENTIONS:  1. Administer medication as ordered  2. Teach and rehearse alternative coping skills  3. Provide emotional support with 1:1 interaction with staff  Outcome: Not Progressing  Flowsheets (Taken 9/27/2024 8196)  Will report anxiety at manageable levels:   Administer medication as ordered   Provide emotional support with 1:1 interaction with staff   Teach and rehearse alternative coping skills  Note: Patient remains free from harm to self or others but admits to continued anxiety and depression. Medications available upon request. Staff ensures safety by providing safety checks on the unit intermittently and every 15 minutes. Staff continues to provide a safe environment. Staff encouraged patient to notify if depression continues.      
[facility]  Day 3 Interdisciplinary Treatment Plan NOTE    Review Date & Time: 9/30/24 0900    Admission Type:   Admission Type: Voluntary    Reason for admission:  Reason for Admission: Voluntary from [facility] after being brought in by [employer] for attempting to break into a [employer].with an increase in auditory disturbances. Upon admission, patient denies suicidal ideations, thought blocking with responses, reports hearing\"voices\" and states she has been off her medications for a few weeks. Currently linked with [facility].  Estimated Length of Stay:  5-7 days  Estimated Discharge Date Update:   to be determined by physician    PATIENT STRENGTHS:  Patient Strengths:   Patient Strengths and Limitations:Limitations: Multiple barriers to leisure interests, Inappropriate/potentially harmful leisure interests, Difficulty problem solving/relies on others to help solve problems, External locus of control  Addictive Behavior:Addictive Behavior  In the Past 3 Months, Have You Felt or Has Someone Told You That You Have a Problem With  : None  Medical Problems:  Past Medical History:   Diagnosis Date    ADHD (attention deficit hyperactivity disorder)     Anxiety     Depression     Schizo-affective schizophrenia (HCC)        Risk:  Fall Risk   Alex Scale Alex Scale Score: 22  BVC    Change in scores:  No Changes to plan of Care:  No    Status EXAM:   Mental Status and Behavioral Exam  Normal: No  Level of Assistance: Independent/Self  Facial Expression: Flat  Affect: Appropriate  Level of Consciousness: Alert  Frequency of Checks: 4 times per hour, close  Mood:Normal: No  Mood: Anxious  Motor Activity:Normal: No  Motor Activity: Repetitive acts  Eye Contact: Good  Observed Behavior: Guarded, Preoccupied, Withdrawn  Sexual Misconduct History: Current - no  Preception: [place] to person, Leslie to time, [place] to place  Attention:Normal: No  Attention: Unable to concentrate, Distractible  Thought Processes: Blocking, 
[facility]  Initial Interdisciplinary Treatment Plan Note      Original treatment plan Date & Time: 09/28/2024 0915    Admission Type:  Admission Type: Voluntary    Reason for admission:   Reason for Admission: Voluntary from [facility] after being brought in by [employer] for attempting to break into a [employer].with an increase in auditory disturbances. Upon admission, patient denies suicidal ideations, thought blocking with responses, reports hearing\"voices\" and states she has been off her medications for a few weeks. Currently linked with [facility].    Estimated Length of Stay:  5-7days  Estimated Discharge Date: To be determined by physician.    PATIENT STRENGTHS:  Patient Strengths:   Patient Strengths and Limitations:   Addictive Behavior: Addictive Behavior  In the Past 3 Months, Have You Felt or Has Someone Told You That You Have a Problem With  : None  Medical Problems:  Past Medical History:   Diagnosis Date    ADHD (attention deficit hyperactivity disorder)     Anxiety     Depression     Schizo-affective schizophrenia (HCC)      Status EXAM:Mental Status and Behavioral Exam  Normal: No  Level of Assistance: Independent/Self  Facial Expression: Elevated, Worried, Flat, Expressionless  Affect: Inappropriate, Unstable, Blunt  Level of Consciousness: Alert  Frequency of Checks: 4 times per hour, close  Mood:Normal: No  Mood: Suspicious  Motor Activity:Normal: Yes  Eye Contact: Good  Observed Behavior: Aggressive, Agitated, Cooperative, Guarded, Preoccupied  Sexual Misconduct History: Current - no  Preception: [place] to person, [place] to time, [place] to place  Attention:Normal: No  Attention: Unable to concentrate, Hyperalert  Thought Processes: Blocking, Circumstantial, Flight of ideas  Thought Content:Normal: No  Thought Content: Preoccupations, Paranoia, Obsessions, Delusions  Depression Symptoms: Impaired concentration, Change in energy level  Anxiety Symptoms: Unexplained fears, Obsessions, 
Problem: Depression  Goal: Will be euthymic at discharge  Description: INTERVENTIONS:  1. Administer medication as ordered  2. Provide emotional support via 1:1 interaction with staff  3. Encourage involvement in milieu/groups/activities  4. Monitor for social isolation  10/2/2024 0413 by Alia Mitchell LPN  Outcome: Progressing     Problem: Psychosis  Goal: Will report no hallucinations or delusions  Description: INTERVENTIONS:  1. Administer medication as  ordered  2. Assist with reality testing to support increasing orientation  3. Assess if patient's hallucinations or delusions are encouraging self harm or harm to others and intervene as appropriate  10/2/2024 0413 by Alia Mitchell LPN  Outcome: Progressing  Patient denies auditory/visual hallucinations. Patient appears suspicious/paranoid. Patient is flat, withdrawn and isolative to self. Patient has episodes of just staring and is selective with responses. Patient is compliant with HS medications. Q15M checks continue per policy and safety maintained.     Problem: Anxiety  Goal: Will report anxiety at manageable levels  Description: INTERVENTIONS:  1. Administer medication as ordered  2. Teach and rehearse alternative coping skills  3. Provide emotional support with 1:1 interaction with staff  10/2/2024 0413 by Alia Mitchell LPN  Outcome: Progressing    Problem: Safety - Adult  Goal: Free from fall injury  Outcome: Progressing     Problem: Pain  Goal: Verbalizes/displays adequate comfort level or baseline comfort level  Outcome: Progressing     
staff  Taken 9/28/2024 2109  Will report anxiety at manageable levels:   Administer medication as ordered   Teach and rehearse alternative coping skills   Provide emotional support with 1:1 interaction with staff

## 2024-10-03 NOTE — GROUP NOTE
Group Therapy Note    Date: 10/3/2024    Group Start Time: 1000  Group End Time: 1040  Group Topic: Psychoeducation    [facility] Lauryn Head LSW        Group Therapy Note    Attendees: 4/8       Patient's Goal:  self-reflection and brainstorming using journaling     Notes:  journal and encouragement stickers provided     Status After Intervention:  Improved    Participation Level: Interactive    Participation Quality: Attentive      Speech:  normal      Thought Process/Content: Logical      Affective Functioning: Blunted      Mood: anxious      Level of consciousness:  Alert      Response to Learning: Progressing to goal      Endings: None Reported    Modes of Intervention: Education and Support      Discipline Responsible: /Counselor      Signature:  MARTITA Valencia

## 2024-10-03 NOTE — DISCHARGE INSTRUCTIONS
Information:  Medications:   Medication summary provided   I understand that I should take only the medications on my list.     -why and when I need to take each medicine.     -which side effects to watch for.     -that I should carry my medication information at all times in case of     Emergency situations.    I will take all of my medicines to follow up appointments.     -check with my physician or pharmacist before taking any new    Medication, over the counter product or drink alcohol.    -Ask about food, drug or dietary supplement interactions.    -discard old lists and update records with medication providers.    Notify Physician:  Notify physician if you notice:   Always call 911 if you feel your life is in danger  In case of an emergency call 911 immediately!  If 911 is not available call your local emergency medical system for help    Behavioral Health Follow Up:  Original Referral Source:[facility] ED   Discharge Diagnosis: Acute psychosis (HCC) [F23]  Acute cystitis with hematuria [N30.01]  Schizoaffective disorder, unspecified type (HCC) [F25.9]  Recommendations for Level of Care: take medication as prescribed and attend all appointments as scheduled   Patient status at discharge: stable   My hospital  was: [facility]  Aftercare plan faxed: Tami    -faxed by: Staff    -date: 10/3/2024   -time: 1200  Prescriptions: Sent with Patient     Smoking: Quit Smoking.   Call the [facility]'s smoking quitline at 5-848-27B-QUIT  Know the signs of a heart attack   If you have any of the following symptoms call 911 immediately, do not wait more    Than five minutes.    1. Pressure, fullness and/ or squeezing in the center of the chest spreading to    The jaw, neck or shoulder.    2. Chest discomfort with light headedness, fainting, sweating, nausea or    Shortness of breath.   3. Upper abdominal pressure or discomfort.   4. Lower chest pain, back pain, unusual fatigue, shortness of breath, nausea   Or

## 2024-10-03 NOTE — BH NOTE
Patient given tobacco quitline number 04016456161 at this time, refusing to call at this time, states \" I just dont want to quit now\"- patient given information as to the dangers of long term tobacco use. Continue to reinforce the importance of tobacco cessation.
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
## 2024-10-17 ENCOUNTER — HOSPITAL ENCOUNTER (EMERGENCY)
Age: 24
Discharge: HOME | End: 2024-10-17
Attending: STUDENT IN AN ORGANIZED HEALTH CARE EDUCATION/TRAINING PROGRAM
Payer: COMMERCIAL

## 2024-10-17 VITALS
TEMPERATURE: 98.3 F | BODY MASS INDEX: 25.76 KG/M2 | HEIGHT: 62 IN | RESPIRATION RATE: 18 BRPM | DIASTOLIC BLOOD PRESSURE: 82 MMHG | HEART RATE: 104 BPM | OXYGEN SATURATION: 100 % | SYSTOLIC BLOOD PRESSURE: 126 MMHG | WEIGHT: 140 LBS

## 2024-10-17 DIAGNOSIS — F20.9 SCHIZOPHRENIA, UNSPECIFIED TYPE (HCC): Primary | ICD-10-CM

## 2024-10-17 PROCEDURE — 99282 EMERGENCY DEPT VISIT SF MDM: CPT

## 2024-10-17 ASSESSMENT — ENCOUNTER SYMPTOMS
VOMITING: 0
RHINORRHEA: 0
SHORTNESS OF BREATH: 0
EYE DISCHARGE: 0
DIARRHEA: 0
SORE THROAT: 0
EYE REDNESS: 0
ABDOMINAL PAIN: 0
NAUSEA: 0

## 2024-10-17 ASSESSMENT — PAIN - FUNCTIONAL ASSESSMENT: PAIN_FUNCTIONAL_ASSESSMENT: NONE - DENIES PAIN

## 2024-10-18 NOTE — ED PROVIDER NOTES
EMERGENCY DEPARTMENT ENCOUNTER    Pt Name: Lisy Colin  MRN: [id number]  Birthdate 2000  Date of evaluation: 10/17/24  CHIEF COMPLAINT       Chief Complaint   Patient presents with    Hallucinations     Called [employer] for help with psychosis  Pt is responding to internal stimuli- admits to at least 3 voices  Denies commands of SI/HI  Not taking meds     HISTORY OF PRESENT ILLNESS   This is a 24-year-old history of schizoaffective, depression, anxiety number she is brought in initially for mental health evaluation    Patient states earlier she was having issues with her mental health.  She called 911.  She came in voluntarily    She states now she is feeling better.  She denies being suicidal.  She is not homicidal.  She denies any auditory visual hallucinations    No other complaints              REVIEW OF SYSTEMS     Review of Systems   Constitutional:  Negative for chills and fever.   HENT:  Negative for rhinorrhea and sore throat.    Eyes:  Negative for discharge and redness.   Respiratory:  Negative for shortness of breath.    Cardiovascular:  Negative for chest pain.   Gastrointestinal:  Negative for abdominal pain, diarrhea, nausea and vomiting.   Genitourinary:  Negative for dysuria, frequency and urgency.   Musculoskeletal:  Negative for arthralgias and myalgias.   Skin:  Negative for rash.   Neurological:  Negative for weakness and numbness.   Psychiatric/Behavioral:  Negative for hallucinations and suicidal ideas.    All other systems reviewed and are negative.    PASTMEDICAL HISTORY     Past Medical History:   Diagnosis Date    ADHD (attention deficit hyperactivity disorder)     Anxiety     Depression     Schizo-affective schizophrenia (HCC)      Past Problem List  Patient Active Problem List   Diagnosis Code    Moderate single current episode of major depressive disorder (HCC) F32.1    Generalized anxiety disorder F41.1    Encounter for surveillance of injectable contraceptive Z30.42    Suicide attempt

## 2024-10-18 NOTE — ED NOTES
Lisy Colin is a 24 year old female who presents to the ED via [employer]. Pt called 911 requesting help for a mental health crisis. Pt was then transported to the ED on a voluntary status.     Pt denies SI/HI/AH/VH. Pt states pt called 911 because pt's mental health wasn't doing well, however pt is feeling much better now and is requesting to go home. Pt reported hearing 3 different voices during triage and is now denying hearing any voices to this writer and ED Dr. Pt is adamant pt wants to return home. Pt states pt follows up with [facility] on [address] and is compliant taking pt's risperidone. Pt denies the use of alcohol and illegal drugs. Pt's most recent UDS on 9/27/24 is positive for cocaine, fentanyl and marijuana.     Pt cooperative, alert and oriented. Pt is well groomed. Pt appears to have some thought blocking secondary to internal stimuli. Pt is not a risk to self or others at this time. Pt is requesting to leave and follow up with [facility].     JOANNE consulted with ED Dr. Both JOANNE and ED Dr agree pt can be discharged. JOANNE updated pt on pt's plan of care. Pt agreeable with this plan and reports feeling safe taking cab ride home.

## 2024-10-18 NOTE — ED NOTES
Pt was talking to registration to sign for discharge when a colleague opened the door and Lisy bolted out of the door. When attempted to console and mediate the situation Pt stated that she  do not want to wait for a cab and wanted to leave to her own care. Pt is Alert and oriented and not suicidal or homicidal     Pt was due to DC was pending registration.

## 2024-12-18 ENCOUNTER — HOSPITAL ENCOUNTER (INPATIENT)
Age: 24
LOS: 5 days | Discharge: HOME | DRG: 885 | End: 2024-12-23
Attending: EMERGENCY MEDICINE | Admitting: PSYCHIATRY & NEUROLOGY
Payer: COMMERCIAL

## 2024-12-18 DIAGNOSIS — F29 PSYCHOSIS, UNSPECIFIED PSYCHOSIS TYPE (HCC): Primary | ICD-10-CM

## 2024-12-18 LAB
ALBUMIN SERPL-MCNC: 4.6 G/DL (ref 3.5–5.2)
ALP SERPL-CCNC: 62 U/L (ref 35–104)
ALT SERPL-CCNC: 13 U/L (ref 10–35)
ANION GAP SERPL CALCULATED.3IONS-SCNC: 13 MMOL/L (ref 9–16)
AST SERPL-CCNC: 20 U/L (ref 10–35)
BASOPHILS # BLD: 0.1 K/UL (ref 0–0.2)
BASOPHILS NFR BLD: 1 % (ref 0–2)
BILIRUB SERPL-MCNC: 0.2 MG/DL (ref 0–1.2)
BUN SERPL-MCNC: 10 MG/DL (ref 6–20)
CALCIUM SERPL-MCNC: 9.4 MG/DL (ref 8.6–10.4)
CHLORIDE SERPL-SCNC: 107 MMOL/L (ref 98–107)
CO2 SERPL-SCNC: 20 MMOL/L (ref 20–31)
CREAT SERPL-MCNC: 0.9 MG/DL (ref 0.7–1.2)
EOSINOPHIL # BLD: 0 K/UL (ref 0–0.4)
EOSINOPHILS RELATIVE PERCENT: 0 % (ref 0–4)
ERYTHROCYTE [DISTWIDTH] IN BLOOD BY AUTOMATED COUNT: 12.8 % (ref 11.5–14.9)
ETHANOL PERCENT: 0 %
ETHANOLAMINE SERPL-MCNC: <10 MG/DL (ref 0–0.08)
GFR, ESTIMATED: >90 ML/MIN/1.73M2
GLUCOSE SERPL-MCNC: 96 MG/DL (ref 74–99)
HCG SERPL QL: NEGATIVE
HCT VFR BLD AUTO: 39.5 % (ref 36–46)
HGB BLD-MCNC: 13.2 G/DL (ref 12–16)
LYMPHOCYTES NFR BLD: 1.8 K/UL (ref 1–4.8)
LYMPHOCYTES RELATIVE PERCENT: 13 % (ref 24–44)
MAGNESIUM SERPL-MCNC: 2.3 MG/DL (ref 1.6–2.6)
MCH RBC QN AUTO: 31.4 PG (ref 26–34)
MCHC RBC AUTO-ENTMCNC: 33.4 G/DL (ref 31–37)
MCV RBC AUTO: 94 FL (ref 80–100)
MONOCYTES NFR BLD: 0.9 K/UL (ref 0.1–1.3)
MONOCYTES NFR BLD: 7 % (ref 1–7)
NEUTROPHILS NFR BLD: 79 % (ref 36–66)
NEUTS SEG NFR BLD: 10.7 K/UL (ref 1.3–9.1)
PLATELET # BLD AUTO: 314 K/UL (ref 150–450)
PMV BLD AUTO: 7.8 FL (ref 6–12)
POTASSIUM SERPL-SCNC: 4 MMOL/L (ref 3.7–5.3)
PROT SERPL-MCNC: 7 G/DL (ref 6.6–8.7)
RBC # BLD AUTO: 4.2 M/UL (ref 4–5.2)
SODIUM SERPL-SCNC: 140 MMOL/L (ref 136–145)
WBC OTHER # BLD: 13.5 K/UL (ref 3.5–11)

## 2024-12-18 PROCEDURE — 80053 COMPREHEN METABOLIC PANEL: CPT

## 2024-12-18 PROCEDURE — 83735 ASSAY OF MAGNESIUM: CPT

## 2024-12-18 PROCEDURE — 99285 EMERGENCY DEPT VISIT HI MDM: CPT

## 2024-12-18 PROCEDURE — 85025 COMPLETE CBC W/AUTO DIFF WBC: CPT

## 2024-12-18 PROCEDURE — 96372 THER/PROPH/DIAG INJ SC/IM: CPT

## 2024-12-18 PROCEDURE — 6360000002 HC RX W HCPCS: Performed by: EMERGENCY MEDICINE

## 2024-12-18 PROCEDURE — 2040000000 HC PSYCH ICU R&B

## 2024-12-18 PROCEDURE — 84703 CHORIONIC GONADOTROPIN ASSAY: CPT

## 2024-12-18 PROCEDURE — G0480 DRUG TEST DEF 1-7 CLASSES: HCPCS

## 2024-12-18 PROCEDURE — 36415 COLL VENOUS BLD VENIPUNCTURE: CPT

## 2024-12-18 RX ORDER — MAGNESIUM HYDROXIDE/ALUMINUM HYDROXICE/SIMETHICONE 120; 1200; 1200 MG/30ML; MG/30ML; MG/30ML
30 SUSPENSION ORAL EVERY 6 HOURS PRN
Status: DISCONTINUED | OUTPATIENT
Start: 2024-12-18 | End: 2024-12-23 | Stop reason: HOSPADM

## 2024-12-18 RX ORDER — POLYETHYLENE GLYCOL 3350 17 G
2 POWDER IN PACKET (EA) ORAL
Status: DISCONTINUED | OUTPATIENT
Start: 2024-12-18 | End: 2024-12-22

## 2024-12-18 RX ORDER — HYDROXYZINE HYDROCHLORIDE 50 MG/1
50 TABLET, FILM COATED ORAL 3 TIMES DAILY PRN
Status: DISCONTINUED | OUTPATIENT
Start: 2024-12-18 | End: 2024-12-23 | Stop reason: HOSPADM

## 2024-12-18 RX ORDER — LORAZEPAM 1 MG/1
2 TABLET ORAL EVERY 6 HOURS PRN
Status: DISCONTINUED | OUTPATIENT
Start: 2024-12-18 | End: 2024-12-23 | Stop reason: HOSPADM

## 2024-12-18 RX ORDER — MIDAZOLAM HYDROCHLORIDE 1 MG/ML
5 INJECTION, SOLUTION INTRAMUSCULAR; INTRAVENOUS ONCE
Status: COMPLETED | OUTPATIENT
Start: 2024-12-18 | End: 2024-12-18

## 2024-12-18 RX ORDER — HALOPERIDOL 5 MG/ML
5 INJECTION INTRAMUSCULAR EVERY 6 HOURS PRN
Status: DISCONTINUED | OUTPATIENT
Start: 2024-12-18 | End: 2024-12-23 | Stop reason: HOSPADM

## 2024-12-18 RX ORDER — IBUPROFEN 400 MG/1
400 TABLET, FILM COATED ORAL EVERY 6 HOURS PRN
Status: DISCONTINUED | OUTPATIENT
Start: 2024-12-18 | End: 2024-12-23 | Stop reason: HOSPADM

## 2024-12-18 RX ORDER — ACETAMINOPHEN 325 MG/1
650 TABLET ORAL EVERY 4 HOURS PRN
Status: DISCONTINUED | OUTPATIENT
Start: 2024-12-18 | End: 2024-12-23 | Stop reason: HOSPADM

## 2024-12-18 RX ORDER — HALOPERIDOL 5 MG/1
5 TABLET ORAL EVERY 6 HOURS PRN
Status: DISCONTINUED | OUTPATIENT
Start: 2024-12-18 | End: 2024-12-23 | Stop reason: HOSPADM

## 2024-12-18 RX ORDER — POLYETHYLENE GLYCOL 3350 17 G/17G
17 POWDER, FOR SOLUTION ORAL DAILY PRN
Status: DISCONTINUED | OUTPATIENT
Start: 2024-12-18 | End: 2024-12-23 | Stop reason: HOSPADM

## 2024-12-18 RX ORDER — HALOPERIDOL 5 MG/ML
5 INJECTION INTRAMUSCULAR ONCE
Status: COMPLETED | OUTPATIENT
Start: 2024-12-18 | End: 2024-12-18

## 2024-12-18 RX ORDER — DIPHENHYDRAMINE HYDROCHLORIDE 50 MG/ML
50 INJECTION INTRAMUSCULAR; INTRAVENOUS EVERY 6 HOURS PRN
Status: DISCONTINUED | OUTPATIENT
Start: 2024-12-18 | End: 2024-12-23 | Stop reason: HOSPADM

## 2024-12-18 RX ORDER — LORAZEPAM 2 MG/ML
2 INJECTION INTRAMUSCULAR EVERY 6 HOURS PRN
Status: DISCONTINUED | OUTPATIENT
Start: 2024-12-18 | End: 2024-12-23 | Stop reason: HOSPADM

## 2024-12-18 RX ADMIN — MIDAZOLAM 5 MG: 1 INJECTION INTRAMUSCULAR; INTRAVENOUS at 19:53

## 2024-12-18 RX ADMIN — HALOPERIDOL LACTATE 5 MG: 5 INJECTION, SOLUTION INTRAMUSCULAR at 19:53

## 2024-12-18 ASSESSMENT — SLEEP AND FATIGUE QUESTIONNAIRES
DO YOU HAVE DIFFICULTY SLEEPING: NO
DO YOU USE A SLEEP AID: NO
AVERAGE NUMBER OF SLEEP HOURS: 4

## 2024-12-18 ASSESSMENT — PATIENT HEALTH QUESTIONNAIRE - PHQ9
SUM OF ALL RESPONSES TO PHQ QUESTIONS 1-9: 0
2. FEELING DOWN, DEPRESSED OR HOPELESS: NOT AT ALL
SUM OF ALL RESPONSES TO PHQ9 QUESTIONS 1 & 2: 0
SUM OF ALL RESPONSES TO PHQ QUESTIONS 1-9: 0
1. LITTLE INTEREST OR PLEASURE IN DOING THINGS: NOT AT ALL

## 2024-12-19 PROBLEM — F25.0 SCHIZOAFFECTIVE DISORDER, BIPOLAR TYPE (HCC): Status: ACTIVE | Noted: 2023-02-18

## 2024-12-19 LAB
AMPHET UR QL SCN: NEGATIVE
BACTERIA URNS QL MICRO: ABNORMAL
BARBITURATES UR QL SCN: NEGATIVE
BENZODIAZ UR QL: POSITIVE
BILIRUB UR QL STRIP: NEGATIVE
CANNABINOIDS UR QL SCN: POSITIVE
CASTS #/AREA URNS LPF: ABNORMAL /LPF
CLARITY UR: ABNORMAL
COCAINE UR QL SCN: NEGATIVE
COLOR UR: YELLOW
EPI CELLS #/AREA URNS HPF: ABNORMAL /HPF
FENTANYL UR QL: POSITIVE
GLUCOSE UR STRIP-MCNC: NEGATIVE MG/DL
HGB UR QL STRIP.AUTO: NEGATIVE
KETONES UR STRIP-MCNC: ABNORMAL MG/DL
LEUKOCYTE ESTERASE UR QL STRIP: NEGATIVE
METHADONE UR QL: NEGATIVE
NITRITE UR QL STRIP: NEGATIVE
OPIATES UR QL SCN: NEGATIVE
OXYCODONE UR QL SCN: NEGATIVE
PCP UR QL SCN: NEGATIVE
PH UR STRIP: 7.5 [PH] (ref 5–8)
PROT UR STRIP-MCNC: NEGATIVE MG/DL
RBC #/AREA URNS HPF: ABNORMAL /HPF
SP GR UR STRIP: 1.02 (ref 1–1.03)
TEST INFORMATION: ABNORMAL
TSH SERPL DL<=0.05 MIU/L-ACNC: 0.86 UIU/ML (ref 0.27–4.2)
UROBILINOGEN UR STRIP-ACNC: NORMAL EU/DL (ref 0–1)
WBC #/AREA URNS HPF: ABNORMAL /HPF

## 2024-12-19 PROCEDURE — 6370000000 HC RX 637 (ALT 250 FOR IP): Performed by: PSYCHIATRY & NEUROLOGY

## 2024-12-19 PROCEDURE — APPSS60 APP SPLIT SHARED TIME 46-60 MINUTES: Performed by: NURSE PRACTITIONER

## 2024-12-19 PROCEDURE — 2040000000 HC PSYCH ICU R&B

## 2024-12-19 PROCEDURE — 99223 1ST HOSP IP/OBS HIGH 75: CPT | Performed by: PSYCHIATRY & NEUROLOGY

## 2024-12-19 PROCEDURE — 84443 ASSAY THYROID STIM HORMONE: CPT

## 2024-12-19 PROCEDURE — 99222 1ST HOSP IP/OBS MODERATE 55: CPT | Performed by: INTERNAL MEDICINE

## 2024-12-19 PROCEDURE — 81001 URINALYSIS AUTO W/SCOPE: CPT

## 2024-12-19 PROCEDURE — 6370000000 HC RX 637 (ALT 250 FOR IP): Performed by: NURSE PRACTITIONER

## 2024-12-19 PROCEDURE — 80307 DRUG TEST PRSMV CHEM ANLYZR: CPT

## 2024-12-19 PROCEDURE — 36415 COLL VENOUS BLD VENIPUNCTURE: CPT

## 2024-12-19 RX ORDER — RISPERIDONE 1 MG/1
1 TABLET, ORALLY DISINTEGRATING ORAL 2 TIMES DAILY
Status: DISCONTINUED | OUTPATIENT
Start: 2024-12-19 | End: 2024-12-21

## 2024-12-19 RX ADMIN — Medication 3 MG: at 22:00

## 2024-12-19 RX ADMIN — RISPERIDONE 1 MG: 1 TABLET, ORALLY DISINTEGRATING ORAL at 22:00

## 2024-12-19 ASSESSMENT — PAIN SCALES - GENERAL: PAINLEVEL_OUTOF10: 0
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
## 2024-12-19 NOTE — H&P
[facility] Internal Medicine  Moisés Zuluaga MD; Chun Robbins MD, Evangelist Saldana MD, Jagruti Leahy MD, Bridger Warner MD; Chaparro Banks MD    [facility] Internal Medicine   IN-PATIENT SERVICE   [facility]     HISTORY AND PHYSICAL EXAMINATION            Date:   12/19/2024  Patient name:  Lisy Colin  Date of admission:  12/18/2024  7:24 PM  MRN:   [id number]  Account:  [id number]  YOB: 2000  PCP:    No primary care provider on file.  Room:   [address] [place] [address]  Code Status:    Full Code      Chief Complaint:     Suicidal /Ac Psychosis    History Obtained From:     Patient/EMR/bedside RN     History of Present Illness:     Patient is a 24-year-old female with history of anxiety depression schizophrenia is been admitted at [facility] for further management of acute psychosis.    Past Medical History:     Past Medical History:   Diagnosis Date    ADHD (attention deficit hyperactivity disorder)     Anxiety     Depression     Schizo-affective schizophrenia (HCC)         Past Surgical History:     Past Surgical History:   Procedure Laterality Date    ADENOIDECTOMY      FOOT FRACTURE SURGERY Bilateral 12/16/2021    LEFT CALCANEOUS OPEN REDUCTION INTERNAL FIXATION AND RIGHT FOOT FRACTURES OPEN REDUCTION INTERNAL FIXATION performed by Abilio Watts MD at [facility] OR    FOOT SURGERY Left 5/31/2023    Left foot removal of hardware performed by Abilio Watts MD at [facility] OR    TONSILLECTOMY  2003        Medications Prior to Admission:     Prior to Admission medications    Medication Sig Start Date End Date Taking? Authorizing Provider   risperiDONE (RISPERDAL M-TABS) 1 MG disintegrating tablet Take 1 tablet by mouth 2 times daily 10/3/24   Aaron Glass MD   melatonin 3 MG TABS tablet Take 1 tablet by mouth nightly 10/3/24   Aaron Glass MD        Allergies:     Patient has no known allergies.    Social History:     Tobacco:
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
## 2024-12-19 NOTE — GROUP NOTE
Group Therapy Note    Date: 12/19/2024    Group Start Time: 1100  Group End Time: 1145  Group Topic: Recreational    CZ MANUELKENDRA BRISEIDANacho Kuhn    Recreation Group Note        Date: 12/19/2024   Start Time: 1100  End Time: 1145      Number of Participants in Group & Unit Census:  0/6    Topic: Offered a variety of group, individual, and group activities including to art, music, games, talk time, and various topics including minfdullness and CBT skills.     Goal of Group: Demonstrate positive use of time; Increase sense of community; increase socialization; Demonstrate positive use of time; Increase rapport with staff      Comments:     Patient did not participate in Recreation group, despite staff encouragement and explanation of benefits.  Patient remain seclusive to self.  Q15 minute safety checks maintained for patient safety and will continue to encourage patient to attend unit programming.
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
## 2024-12-19 NOTE — GROUP NOTE
Group Therapy Note    Date: 12/19/2024    Group Start Time: 1000  Group End Time: 1030  Group Topic: Psychotherapy    [facility]        Group Therapy Note    Attendees: 0/6       Patient was offered group therapy today but declined to participate despite encouragement from staff.  1:1 was offered.       Signature:  GANGA Pope, MARTITA
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
## 2024-12-19 NOTE — ED NOTES
Pt ecnouraged to provide a urine sample while in the bathroom changing. Pt walked out of bathroom without providing a urine. Pt unable to provide a urine sample at this time due to pt's current mental health symptoms.

## 2024-12-19 NOTE — GROUP NOTE
Group Therapy Note    Date: 12/19/2024    Group Start Time: 1345  Group End Time: 1415  Group Topic: Music Therapy    [facility]    Nacho Bianchi    Music Therapy Group Note        Date: 12/19/2024   Start Time: 1345   End Time: 1415      Number of Participants in Group & Unit Census:  0/6    Topic: Initially presented p[atient with music therapy topic, but no patient expressed interest. Offered a variety of group, individual, and group activities including to art, music, games, talk time, and various topics including minfdullness and CBT skills.     Goal of Group: Demonstrate positive use of time; Increase sense of community; increase socialization; Demonstrate positive use of time; Increase rapport with staff      Comments:     Patient did not participate in Music Therapy group, despite staff encouragement and explanation of benefits.  Patient remain seclusive to self.  Q15 minute safety checks maintained for patient safety and will continue to encourage patient to attend unit programming.

## 2024-12-19 NOTE — ED NOTES
Patient provided with the opportunity to sign the Rights of an Involuntarily Detained Person form while in [facility] due to being placed on an application for emergency admisison.  This form was explained in detail to the patient at their comprehension level.  Any and all questions were answer to the best of writer's ability.    Patient either chose not to sign this form or they were incapable of signing this form due to their current mental state.  Form will be identified as a patient refusal and a secondary signature will be obtained.
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
## 2024-12-20 PROCEDURE — 99232 SBSQ HOSP IP/OBS MODERATE 35: CPT | Performed by: PSYCHIATRY & NEUROLOGY

## 2024-12-20 PROCEDURE — 6370000000 HC RX 637 (ALT 250 FOR IP): Performed by: NURSE PRACTITIONER

## 2024-12-20 PROCEDURE — 2040000000 HC PSYCH ICU R&B

## 2024-12-20 PROCEDURE — 6370000000 HC RX 637 (ALT 250 FOR IP): Performed by: PSYCHIATRY & NEUROLOGY

## 2024-12-20 PROCEDURE — APPSS30 APP SPLIT SHARED TIME 16-30 MINUTES: Performed by: NURSE PRACTITIONER

## 2024-12-20 RX ADMIN — NICOTINE POLACRILEX 2 MG: 2 LOZENGE ORAL at 18:00

## 2024-12-20 RX ADMIN — NICOTINE POLACRILEX 2 MG: 2 LOZENGE ORAL at 19:01

## 2024-12-20 RX ADMIN — NICOTINE POLACRILEX 2 MG: 2 LOZENGE ORAL at 15:29

## 2024-12-20 RX ADMIN — NICOTINE POLACRILEX 2 MG: 2 LOZENGE ORAL at 20:02

## 2024-12-20 RX ADMIN — RISPERIDONE 1 MG: 1 TABLET, ORALLY DISINTEGRATING ORAL at 08:55

## 2024-12-20 RX ADMIN — HYDROXYZINE HYDROCHLORIDE 50 MG: 50 TABLET ORAL at 21:16

## 2024-12-20 RX ADMIN — Medication 3 MG: at 21:16

## 2024-12-20 RX ADMIN — NICOTINE POLACRILEX 2 MG: 2 LOZENGE ORAL at 16:30

## 2024-12-20 RX ADMIN — RISPERIDONE 1 MG: 1 TABLET, ORALLY DISINTEGRATING ORAL at 21:16

## 2024-12-20 NOTE — PLAN OF CARE
Problem: Risk for Elopement  Goal: Patient will not exit the unit/facility without proper excort  12/19/2024 2240 by Alexander Garcia, RN  Outcome: Progressing     Patient does not verbalize wanting to elope off the unit. Patient does not display any signs of wanting to leave the unit.     Problem: Anxiety  Goal: Will report anxiety at manageable levels  Description: INTERVENTIONS:  1. Administer medication as ordered  2. Teach and rehearse alternative coping skills  3. Provide emotional support with 1:1 interaction with staff  12/19/2024 2240 by Alexander Garcia, RN  Outcome: Progressing   Patient denies any anxiety at this time. Patient does not display any signs or symptoms of anxiety.

## 2024-12-20 NOTE — PLAN OF CARE
Problem: Risk for Elopement  Goal: Patient will not exit the unit/facility without proper excort  12/20/2024 1134 by Priscila Stone, RN  Outcome: Progressing  Patient has not made any elopement risks this shift.     Problem: Anxiety  Goal: Will report anxiety at manageable levels  Description: INTERVENTIONS:  1. Administer medication as ordered  2. Teach and rehearse alternative coping skills  3. Provide emotional support with 1:1 interaction with staff  12/20/2024 1134 by Priscila Stone, RN  Outcome: Progressing  Patient is alert, observed in room. Patient is flat and anxious on approach. Patient is currently denying thoughts of wanting to harm self or others. Patient reports anxiety and depression due to life stressors, reports hearing voices of negative comments. Reassurance and support given. Coping skills explored and discussed. Patient has been isolative to room, only coming out for needs. Patient is meal compliant, reports adequate sleep. Patient signed in voluntarily today, is medication compliant, denies having any side effects. Patient encouraged to attend to hygiene care, socialize with peers, and attend unit programming. No further concerns voiced. Will continue to monitor.     Problem: Pain  Goal: Verbalizes/displays adequate comfort level or baseline comfort level  Outcome: Progressing  Patient is currently positive for marijuana, fentanyl, and benzodiazepines. Patient is denying pain, or withdrawal symptoms.

## 2024-12-20 NOTE — GROUP NOTE
Group Therapy Note    Date: 12/20/2024    Group Start Time: 1100  Group End Time: 1145  Group Topic: Music Therapy    [facility]    Nacho Bianchi    Group Therapy Note     Date: 12/20/2024     Group Start Time: 1345  Group End Time: 1415  Group Topic: Music Therapy     [facility]    Nacho Bianchi     Recreation Group Note           Date: 12/19/2024              Start Time: 1100                                End Time: 1145        Number of Participants in Group & Unit Census:  1/6     Topic: Offered a variety of group, individual, and group activities including to art, music, games, talk time, and various topics including minfdullness and CBT skills.      Goal of Group: Demonstrate positive use of time; Increase sense of community; increase socialization; Demonstrate positive use of time; Increase rapport with staff        Comments:      Patient did not participate in Recreation group, despite staff encouragement and explanation of benefits.  Patient remain seclusive to self.  Q15 minute safety checks maintained for patient safety and will continue to encourage patient to attend unit programming.

## 2024-12-20 NOTE — GROUP NOTE
Group Therapy Note    Date: 12/20/2024    Group Start Time: 0830  Group End Time: 0900  Group Topic: Community Meeting    Laverne Conley        Group Therapy Note    Attendees: 6/7       Patient's Goal:  Talk to her doctor    Notes:  Goals setting    Status After Intervention:  Unchanged    Participation Level: Minimal    Participation Quality: Resistant      Speech:  hesitant      Thought Process/Content: Flight of ideas      Affective Functioning: Blunted      Mood: anxious      Level of consciousness:  Preoccupied      Response to Learning: Able to verbalize current knowledge/experience      Endings: None Reported    Modes of Intervention: Education, Support, Socialization, and Exploration      Discipline Responsible: Behavorial Health Tech      Signature:  Laverne Camara

## 2024-12-21 PROCEDURE — 6370000000 HC RX 637 (ALT 250 FOR IP): Performed by: PSYCHIATRY & NEUROLOGY

## 2024-12-21 PROCEDURE — 99232 SBSQ HOSP IP/OBS MODERATE 35: CPT | Performed by: PSYCHIATRY & NEUROLOGY

## 2024-12-21 PROCEDURE — 6370000000 HC RX 637 (ALT 250 FOR IP): Performed by: NURSE PRACTITIONER

## 2024-12-21 PROCEDURE — 2040000000 HC PSYCH ICU R&B

## 2024-12-21 RX ORDER — QUETIAPINE FUMARATE 50 MG/1
50 TABLET, FILM COATED ORAL 2 TIMES DAILY
Status: DISCONTINUED | OUTPATIENT
Start: 2024-12-21 | End: 2024-12-22

## 2024-12-21 RX ADMIN — NICOTINE POLACRILEX 2 MG: 2 LOZENGE ORAL at 12:46

## 2024-12-21 RX ADMIN — RISPERIDONE 1 MG: 1 TABLET, ORALLY DISINTEGRATING ORAL at 07:41

## 2024-12-21 RX ADMIN — NICOTINE POLACRILEX 2 MG: 2 LOZENGE ORAL at 19:27

## 2024-12-21 RX ADMIN — NICOTINE POLACRILEX 2 MG: 2 LOZENGE ORAL at 11:07

## 2024-12-21 RX ADMIN — NICOTINE POLACRILEX 2 MG: 2 LOZENGE ORAL at 15:00

## 2024-12-21 RX ADMIN — NICOTINE POLACRILEX 2 MG: 2 LOZENGE ORAL at 22:56

## 2024-12-21 RX ADMIN — NICOTINE POLACRILEX 2 MG: 2 LOZENGE ORAL at 08:56

## 2024-12-21 RX ADMIN — QUETIAPINE FUMARATE 50 MG: 50 TABLET ORAL at 20:27

## 2024-12-21 RX ADMIN — NICOTINE POLACRILEX 2 MG: 2 LOZENGE ORAL at 20:54

## 2024-12-21 RX ADMIN — NICOTINE POLACRILEX 2 MG: 2 LOZENGE ORAL at 10:11

## 2024-12-21 RX ADMIN — HYDROXYZINE HYDROCHLORIDE 50 MG: 50 TABLET ORAL at 12:56

## 2024-12-21 RX ADMIN — NICOTINE POLACRILEX 2 MG: 2 LOZENGE ORAL at 07:41

## 2024-12-21 RX ADMIN — NICOTINE POLACRILEX 2 MG: 2 LOZENGE ORAL at 18:17

## 2024-12-21 NOTE — PLAN OF CARE
Problem: Risk for Elopement  Goal: Patient will not exit the unit/facility without proper excort  12/20/2024 2002 by Lauryn Rice LPN  Note: Patient has made no attempts at elopement this evening. She has been calm and controlled in the dayroom. Q15min safety checks continue     Problem: Anxiety  Goal: Will report anxiety at manageable levels  Description: INTERVENTIONS:  1. Administer medication as ordered  2. Teach and rehearse alternative coping skills  3. Provide emotional support with 1:1 interaction with staff  12/20/2024 2002 by Lauryn Rice LPN  Note: Patient denies suicidal thoughts and hallucinations. When asked about depression and anxiety she stated \"great, its all great\". She had a flat affect and remained expressionless throughout talk time. She has been out in the milieu at times but aloof and calm. Q15min safety checks continue

## 2024-12-21 NOTE — PLAN OF CARE
Problem: Risk for Elopement  Goal: Patient will not exit the unit/facility without proper excort  Outcome: Progressing  No attempts of elopement noted this shift.     Problem: Anxiety  Goal: Will report anxiety at manageable levels  Description: INTERVENTIONS:  1. Administer medication as ordered  2. Teach and rehearse alternative coping skills  3. Provide emotional support with 1:1 interaction with staff  Outcome: Progressing  Patient is alert, observed in day area. Patient is currently denying thoughts of wanting to harm self or others. Patient is voicing feeling anxious and depressed, does not further elaborate content. Patient reports hearing voices has improved. Patient has been visible on unit, aloof of peers, quiet. Patient is medication compliant denies having any side effects. Patient encouraged to attend to hygiene care and socialize with peers. No further concerns voiced. Will continue to monitor.     Problem: Pain  Goal: Verbalizes/displays adequate comfort level or baseline comfort level  Outcome: Progressing  No pain voiced this shift.

## 2024-12-22 PROCEDURE — 6370000000 HC RX 637 (ALT 250 FOR IP): Performed by: NURSE PRACTITIONER

## 2024-12-22 PROCEDURE — 2040000000 HC PSYCH ICU R&B

## 2024-12-22 PROCEDURE — 99232 SBSQ HOSP IP/OBS MODERATE 35: CPT | Performed by: PSYCHIATRY & NEUROLOGY

## 2024-12-22 PROCEDURE — 6370000000 HC RX 637 (ALT 250 FOR IP): Performed by: PSYCHIATRY & NEUROLOGY

## 2024-12-22 RX ORDER — QUETIAPINE FUMARATE 100 MG/1
100 TABLET, FILM COATED ORAL 2 TIMES DAILY
Status: DISCONTINUED | OUTPATIENT
Start: 2024-12-22 | End: 2024-12-23 | Stop reason: HOSPADM

## 2024-12-22 RX ADMIN — NICOTINE POLACRILEX 2 MG: 2 LOZENGE ORAL at 12:34

## 2024-12-22 RX ADMIN — Medication 3 MG: at 20:34

## 2024-12-22 RX ADMIN — NICOTINE POLACRILEX 4 MG: 4 LOZENGE ORAL at 20:35

## 2024-12-22 RX ADMIN — NICOTINE POLACRILEX 4 MG: 4 LOZENGE ORAL at 17:33

## 2024-12-22 RX ADMIN — NICOTINE POLACRILEX 2 MG: 2 LOZENGE ORAL at 11:46

## 2024-12-22 RX ADMIN — NICOTINE POLACRILEX 4 MG: 4 LOZENGE ORAL at 18:41

## 2024-12-22 RX ADMIN — QUETIAPINE FUMARATE 100 MG: 100 TABLET ORAL at 20:34

## 2024-12-22 RX ADMIN — HYDROXYZINE HYDROCHLORIDE 50 MG: 50 TABLET ORAL at 13:02

## 2024-12-22 RX ADMIN — HYDROXYZINE HYDROCHLORIDE 50 MG: 50 TABLET ORAL at 20:34

## 2024-12-22 RX ADMIN — NICOTINE POLACRILEX 4 MG: 4 LOZENGE ORAL at 22:02

## 2024-12-22 RX ADMIN — HYDROXYZINE HYDROCHLORIDE 50 MG: 50 TABLET ORAL at 10:52

## 2024-12-22 RX ADMIN — NICOTINE POLACRILEX 2 MG: 2 LOZENGE ORAL at 10:41

## 2024-12-22 RX ADMIN — NICOTINE POLACRILEX 2 MG: 2 LOZENGE ORAL at 09:26

## 2024-12-22 RX ADMIN — NICOTINE POLACRILEX 2 MG: 2 LOZENGE ORAL at 15:26

## 2024-12-22 RX ADMIN — ACETAMINOPHEN 650 MG: 325 TABLET ORAL at 21:52

## 2024-12-22 RX ADMIN — NICOTINE POLACRILEX 2 MG: 2 LOZENGE ORAL at 14:04

## 2024-12-22 RX ADMIN — QUETIAPINE FUMARATE 50 MG: 50 TABLET ORAL at 09:42

## 2024-12-22 RX ADMIN — NICOTINE POLACRILEX 4 MG: 4 LOZENGE ORAL at 19:43

## 2024-12-22 ASSESSMENT — PAIN SCALES - GENERAL
PAINLEVEL_OUTOF10: 10
PAINLEVEL_OUTOF10: 1

## 2024-12-22 ASSESSMENT — PAIN DESCRIPTION - LOCATION: LOCATION: HEAD

## 2024-12-22 ASSESSMENT — PAIN DESCRIPTION - DESCRIPTORS: DESCRIPTORS: ACHING

## 2024-12-22 NOTE — PROGRESS NOTES
Behavioral Services  Medicare Certification Upon Admission    I certify that this patient's inpatient psychiatric hospital admission is medically necessary for:    [x] (1) Treatment which could reasonably be expected to improve this patient's condition,       [x] (2) Or for diagnostic study;     AND     [x](2) The inpatient psychiatric services are provided while the individual is under the care of a physician and are included in the individualized plan of care.    Estimated length of stay/service 2-9 days    Plan for post-hospital care -outpatient care    Electronically signed by MEENAKSHI MEADOWS MD on 12/19/2024 at 9:04 AM      
  Daily Progress Note  12/20/2024    Patient Name: Lisy Colin    CHIEF COMPLAINT:  Acute psychosis          SUBJECTIVE:      Patient is seen today for a follow up assessment.  She is found resting in bed, assessment conducted in her private room with the door open.  She remains compliant with taking scheduled psychotropic medication and denies adverse effects.  She minimally participates in assessment.  Describes mood as \"good\", affect is flat.  Denies suicidal or homicidal ideation.  Denies auditory or visual hallucinations however has been telling nursing staff she is hearing voices with negative comments.  Presents with significant paranoia.  She has no insight of her mental illness or need for medication.  Per unit nursing staff patient maintains behavioral control with no need for emergency medication for the past 24 hours, spends majority of time self isolated to room.      Appetite:  [x] Adequate/Unchanged  [] Increased  [] Decreased      Sleep:       [x] Adequate/Unchanged  [] Fair  [] Poor      Group Attendance on Unit:   [] Yes   [x] Selectively    [] No    Compliant with scheduled medications: [x] Yes  [] No    Received emergency medications in past 24 hrs: [] Yes   [x] No    Medication Side Effects: Denies         Mental Status Exam  Level of consciousness: Awake and alert  Appearance:  Appropriate attire, resting in bed, fair grooming   Behavior/Motor: Guarded, minimally engages with interviewer, no psychomotor abnormalities  Attitude toward examiner: Minimally cooperative, inattentive, poor eye contact  Speech: Decreased rate and volume, minimal output, monotone  Mood: \"good\"  Affect: flat  Thought processes: Slow  Thought content: Denies suicidal ideations              Denies homicidal ideations               Denies auditory or visual hallucinations              Presents with delusions              Exhibits paranoia  Cognition:  Oriented to self, location, time, situation  Concentration: 
Pharmacy Medication History Note      List of current medications patient is taking is complete.    Source of information: Daniel Ware, [facility], [facility] on [address] ([phone]), AVS from [facility] Admission on 10/17/2024     Changes made to medication list:  Medications removed (include reason, ex. therapy complete or physician discontinued, noncompliance):  none    Medications flagged for provider review:  none    Medications added/doses adjusted:  none    Other notes (ex. Recent course of antibiotics, Coumadin dosing):  OARRs report negative     Patient was previously filling at [facility] on [address] ([phone]), called pharmacy and pharmacy states that they last filled for patient in 09/10/2024 for prescriptions including Trazodone and Olanzapine.     Patient had a recent [facility] admission on 10/17/2024 where Trazodone and Olanzapine were both discontinue per AVS review, patient was instructed to start taking only risperidone and melatonin (see below).    No records found at this time of medications being filled elsewhere       Current Home Medication List at Time of Admission:  Prior to Admission medications    Medication Sig   risperiDONE (RISPERDAL M-TABS) 1 MG disintegrating tablet Take 1 tablet by mouth 2 times daily   melatonin 3 MG TABS tablet Take 1 tablet by mouth nightly         Please let me know if you have any questions about this encounter. Thank you!    Electronically signed by Ammy Fierro RPH on 12/19/2024 at 9:19 AM    
Pharmacy Medication History Note      List of current medications patient is taking is incomplete.    Source of information: [facility] [employer], [facility], [facility]     Changes made to medication list:  Medications removed (include reason, ex. therapy complete or physician discontinued, noncompliance):  None     Medications flagged for provider review:  None     Medications added/doses adjusted:  None     Other notes (ex. Recent course of antibiotics, Coumadin dosing):  OARRS negative   Unable to discuss medications with patient at this time. [facility] Joy does not have most up to date fills and her pharmacy is currently closed. Please contact [facility] on [address] ([phone]) once they reopen to confirm medication dosing.       Current Home Medication List at Time of Admission:  Prior to Admission medications    Medication Sig   risperiDONE (RISPERDAL M-TABS) 1 MG disintegrating tablet Take 1 tablet by mouth 2 times daily   melatonin 3 MG TABS tablet Take 1 tablet by mouth nightly         Please let me know if you have any questions about this encounter. Thank you!    Electronically signed by Shara Sousa RPH on 12/18/2024 at 9:00 PM     
RT ASSESSMENT TREATMENT GOALS    [x]Pt Goal:  Pt will identify 1-2 positive coping skills by time of discharge.    []Pt Goal:  Pt will identify 1-2 positive aspects of self by time of discharge.    [x]Pt Goal:  Pt will remain on task/topic for 15-30 minutes during group by time of discharge.    []Pt Goal:  Pt will identify 1-2 aspects of relapse prevention plan by time of discharge.    []Pt Goal:  Pt will join in conversation with peers 1-2 times per group by time of discharge.    []Pt Goal:  Pt will identify 1-2 new leisure interests by time of discharge.    [x]Pt Goal:  Pt will not voice any delusional content by time of discharge.   
(25 mIU/mL) may give a negative or   indeterminate result.  In such cases, another test should be performed with a new specimen   in 48-72 hours.  If early pregnancy is suspected clinically in this setting, correlation   with quantitative serum b-hCG level is suggested.      Color, UA 12/19/2024 Yellow  Yellow Final    Turbidity UA 12/19/2024 Turbid (A)  Clear Final    Glucose, Ur 12/19/2024 NEGATIVE  NEGATIVE mg/dL Final    Bilirubin, Urine 12/19/2024 NEGATIVE  NEGATIVE Final    Ketones, Urine 12/19/2024 TRACE (A)  NEGATIVE mg/dL Final    Specific Gravity, UA 12/19/2024 1.025  1.000 - 1.030 Final    Urine Hgb 12/19/2024 NEGATIVE  NEGATIVE Final    pH, Urine 12/19/2024 7.5  5.0 - 8.0 Final    Protein, UA 12/19/2024 NEGATIVE  NEGATIVE mg/dL Final    Urobilinogen, Urine 12/19/2024 Normal  0.0 - 1.0 EU/dL Final    Nitrite, Urine 12/19/2024 NEGATIVE  NEGATIVE Final    Leukocyte Esterase, Urine 12/19/2024 NEGATIVE  NEGATIVE Final    TSH 12/19/2024 0.86  0.27 - 4.20 uIU/mL Final    WBC, UA 12/19/2024 0 TO 2 (A)  0 TO 5 /HPF Final    RBC, UA 12/19/2024 0 TO 2  0 TO 2 /HPF Final    Casts UA 12/19/2024 0 TO 2 (A)  None /LPF Final    Epithelial Cells, UA 12/19/2024 0 TO 2  /HPF Final    Bacteria, UA 12/19/2024 None  None Final         Reviewed patient's current plan of care and vital signs with nursing staff.    Labs reviewed: [x] Yes    Medications  Current Facility-Administered Medications: risperiDONE (RISPERDAL M-TABS) disintegrating tablet 1 mg, 1 mg, Oral, BID  acetaminophen (TYLENOL) tablet 650 mg, 650 mg, Oral, Q4H PRN  ibuprofen (ADVIL;MOTRIN) tablet 400 mg, 400 mg, Oral, Q6H PRN  polyethylene glycol (GLYCOLAX) packet 17 g, 17 g, Oral, Daily PRN  aluminum & magnesium hydroxide-simethicone (MAALOX) 200-200-20 MG/5ML suspension 30 mL, 30 mL, Oral, Q6H PRN  hydrOXYzine HCl (ATARAX) tablet 50 mg, 50 mg, Oral, TID PRN  nicotine polacrilex (COMMIT) lozenge 2 mg, 2 mg, Oral, Q1H PRN  haloperidol lactate (HALDOL) injection 5 
tablet 2 mg, 2 mg, Oral, Q6H PRN  melatonin tablet 3 mg, 3 mg, Oral, Nightly PRN    ASSESSMENT  Schizoaffective disorder, bipolar type (HCC)         Patient plan     patient symptoms are: unstable  Monitor need and frequency of PRN medications.  Encourage participation in groups and milieu.  Attempt to develop insight.  Medication management -Seroquel increased to 100 mg twice daily   and discharge plan is to discharge in 1 to 2 days  Follow-up daily while inpatient.     Patient continues to be monitored in the inpatient psychiatric facility at [facility] for safety and stabilization. Patient continues to need, on a daily basis, active treatment furnished directly by or requiring the supervision of inpatient psychiatric personnel.    Electronically signed by MEENAKSHI MEADOWS MD on 12/22/2024 at 3:58 PM    **This report has been created using voice recognition software. It may contain minor errors which are inherent in voice recognition technology.**

## 2024-12-22 NOTE — PLAN OF CARE
Problem: Risk for Elopement  Goal: Patient will not exit the unit/facility without proper excort  Note: PT was trying exit door this evening but states I was just looking. PT was verbally redirected from the door and was accepting.      Problem: Anxiety  Goal: Will report anxiety at manageable levels  Description: INTERVENTIONS:  1. Administer medication as ordered  2. Teach and rehearse alternative coping skills  3. Provide emotional support with 1:1 interaction with staff  Note: PT denies any depression anxiety or hallucinations. PT appears flat guarded and suspicious. PT states she does not know why she is in the hospital. PT remains disheveled and declined to shower this evening.  PT maintained on 15 min safety checks and rounds at irregular intervals.

## 2024-12-22 NOTE — GROUP NOTE
Group Therapy Note    Date: 12/22/2024    Group Start Time: 1030  Group End Time: 1100  Group Topic: Cognitive Skills    [facility]    Mary Bell CTRS      Cognitive Skills Group Note        Date: December 22, 2024 Start Time:  1030 am   End Time:  1100 am       Number of Participants in Group & Unit Census:  1/6    Topic: cognitive skills     Goal of Group:pt will demonstrate improved coping skills and improved interpersonal relationships      Comments:     Patient did not participate in Cognitive Skills group, despite staff encouragement and explanation of benefits.  Patient remain seclusive to self.  Q15 minute safety checks maintained for patient safety and will continue to encourage patient to attend unit programming.              Signature:  ROLA NORTH

## 2024-12-22 NOTE — PLAN OF CARE
Problem: Risk for Elopement  Goal: Patient will not exit the unit/facility without proper excort  12/22/2024 1031 by Essex, Matthew, RN  Outcome: Progressing  Note: Pt has not shown exit seeking behaviors.     Problem: Anxiety  Goal: Will report anxiety at manageable levels  Description: INTERVENTIONS:  1. Administer medication as ordered  2. Teach and rehearse alternative coping skills  3. Provide emotional support with 1:1 interaction with staff  12/22/2024 1031 by Essex, Matthew, RN  Outcome: Progressing  Note: Pt denies having suicidal or homicidal ideations. Pt denies having depression or anxiety. Pt reports having adequate diet and sleep. Pt has been compliant with staff and medical treatment. Pt has been isolative to self most of shift.       "Subjective     Chief Complaint   Patient presents with   • Gynecologic Exam     annual exam.       History of Present Illness    Ayanna Maldonado is a 57 y.o.  who presents for annual exam.  Had biometric screening done at [facility] and \"failed\" the BP eval.  It was slightly elevated on top, 131.  Normal today.  She has gained weight and says her daughter has moved back in with her and likes her carbs.    She is not having issues with HF, did try brisdelle back a few years ago but dc'd it d/t dry eyes.  She has actually stopped her zyrtec and has found that improved her dry eye symptoms.      She is working on wt loss.      Obstetric History:  OB History      Para Term  AB TAB SAB Ectopic Multiple Living    5 2 2  3  3   2         Menstrual History:     No LMP recorded (lmp unknown). Patient is postmenopausal.         Current contraception: post menopausal status  History of abnormal Pap smear: yes - in her 20's  Received Gardasil immunization: no  Perform regular self breast exam: yes - mthly  Family history of uterine or ovarian cancer: yes - Mom with uterine  Family History of colon cancer: no  Family history of breast cancer: yes - pat aunt with breast    Mammogram: done today.  Colonoscopy: up to date. Due in , done in  and found polyp  DEXA: not indicated.    Exercise: not active  Calcium/Vitamin D: adequate intake    The following portions of the patient's history were reviewed and updated as appropriate: allergies, current medications, past family history, past medical history, past social history, past surgical history and problem list.    Review of Systems   Constitutional: Positive for unexpected weight change (weight gain).   All other systems reviewed and are negative.      Pertinent items are noted in HPI.     Objective   Physical Exam    Visit Vitals   • /82   • Ht 62\" (157.5 cm)   • Wt 272 lb (123 kg)   • LMP  (LMP Unknown)   • Breastfeeding No   • BMI 49.75 kg/m2 "       General:   alert, appears stated age and cooperative   Neck: no adenopathy and thyroid normal to palpation   Heart: regular rate and rhythm   Lungs: clear to auscultation bilaterally   Abdomen: soft and nontender   Breast: inspection negative, no nipple discharge or bleeding, no masses or nodularity palpable   Vulva: normal   Vagina: normal mucosa   Cervix: unable to clearly visualize even after multiple resettings of the speculum, blind pap attempted   Uterus: unable to clearly palpate d/t habitus   Adnexa: no mass, fullness, tenderness and unable to clearly palpate d/t size   Rectal: normal rectal, no masses and guaiac negative stool obtained     Assessment/Plan   Ayanna was seen today for gynecologic exam.    Diagnoses and all orders for this visit:    Well woman exam with routine gynecological exam  -     IGP, Aptima HPV, Rfx 16 / 18,45    Post-menopausal    Screening for HPV (human papillomavirus)  -     IGP, Aptima HPV, Rfx 16 / 18,45        All questions answered.  Breast self exam technique reviewed and patient encouraged to perform self-exam monthly.  Discussed healthy lifestyle modifications.  Recommended 30 minutes of aerobic exercise five times per week.  Discussed calcium needs to prevent osteoporosis.    Gave info on Myrisk testing, will check benefits and call back  Enc wt loss and diet and exercise  Disc pap guidelines, plan pap with HPV this year, if nl, due in 3 years

## 2024-12-23 VITALS
DIASTOLIC BLOOD PRESSURE: 97 MMHG | BODY MASS INDEX: 25.61 KG/M2 | SYSTOLIC BLOOD PRESSURE: 128 MMHG | TEMPERATURE: 98.6 F | HEART RATE: 83 BPM | RESPIRATION RATE: 14 BRPM | OXYGEN SATURATION: 100 % | HEIGHT: 62 IN

## 2024-12-23 PROCEDURE — 6370000000 HC RX 637 (ALT 250 FOR IP): Performed by: PSYCHIATRY & NEUROLOGY

## 2024-12-23 PROCEDURE — 99239 HOSP IP/OBS DSCHRG MGMT >30: CPT | Performed by: PSYCHIATRY & NEUROLOGY

## 2024-12-23 PROCEDURE — 6370000000 HC RX 637 (ALT 250 FOR IP): Performed by: NURSE PRACTITIONER

## 2024-12-23 RX ORDER — QUETIAPINE FUMARATE 100 MG/1
100 TABLET, FILM COATED ORAL 2 TIMES DAILY
Qty: 60 TABLET | Refills: 3 | Status: SHIPPED | OUTPATIENT
Start: 2024-12-23

## 2024-12-23 RX ADMIN — NICOTINE POLACRILEX 4 MG: 4 LOZENGE ORAL at 14:16

## 2024-12-23 RX ADMIN — NICOTINE POLACRILEX 4 MG: 4 LOZENGE ORAL at 12:46

## 2024-12-23 RX ADMIN — HYDROXYZINE HYDROCHLORIDE 50 MG: 50 TABLET ORAL at 10:35

## 2024-12-23 RX ADMIN — NICOTINE POLACRILEX 4 MG: 4 LOZENGE ORAL at 11:02

## 2024-12-23 RX ADMIN — QUETIAPINE FUMARATE 100 MG: 100 TABLET ORAL at 09:51

## 2024-12-23 RX ADMIN — NICOTINE POLACRILEX 4 MG: 4 LOZENGE ORAL at 09:51

## 2024-12-23 NOTE — GROUP NOTE
Group Therapy Note    Date: 12/23/2024    Group Start Time: 1100  Group End Time: 1130  Group Topic: Group Documentation    Laverne Conley      Cognitive Skills Group Note        Date: 12/23/2024  Start Time: 11am  End Time: 11:30am      Number of Participants in Group & Unit Census:  2/7    Topic: Discharge planning - mentally    Goal of Group:Changes that need to be made at home      Comments:     Patient did not participate in Cognitive Skills group, despite staff encouragement and explanation of benefits.  Patient remain seclusive to self.  Q15 minute safety checks maintained for patient safety and will continue to encourage patient to attend unit programming.

## 2024-12-23 NOTE — GROUP NOTE
Group Therapy Note    Date: 12/23/2024    Group Start Time: 1000  Group End Time: 1045  Group Topic: Psychotherapy    [facility] MART[facility]        Group Therapy Note    Attendees: 3/7       Patient was offered group therapy today but declined to participate despite encouragement from staff.  1:1 was offered.       Signature:  GANGA Pope, MARTITA

## 2024-12-23 NOTE — GROUP NOTE
Group Therapy Note    Date: 12/23/2024    Group Start Time: 0830  Group End Time: 0900  Group Topic: Community Meeting    Laverne Conley        Group Therapy Note    Attendees: 4/7       Patient's Goal:  \"Talk to the doctor about discharge\"    Notes:  Goal setting    Status After Intervention:  Unchanged    Participation Level: Minimal    Participation Quality: Resistant      Speech:  hesitant      Thought Process/Content: Logical      Affective Functioning: Flat      Mood: anxious      Level of consciousness:  Alert and Preoccupied      Response to Learning: Resistant      Endings: None Reported    Modes of Intervention: Education, Support, Socialization, and Exploration      Discipline Responsible: Behavorial Health Tech      Signature:  Laverne Camara

## 2024-12-23 NOTE — DISCHARGE SUMMARY
homicidal ideation.  Due to patient's inability to fully engage in assessment secondary to psychosis much of documentation was completed by use of EMR.     Patient has a history of schizoaffective disorder, bipolar type.  She has a history of prior suicide attempts.  She has a history of command auditory hallucinations and paranoia.  She typically denies visual hallucinations.  Patient has a history of polysubstance use disorder including cannabinoids, fentanyl, cocaine, and other opioids.  Urine drug screen was not obtained in the ED due to patient refusal.  Blood alcohol level was negative.      PAST MEDICAL/PSYCHIATRIC HISTORY:   Past Medical History:   Diagnosis Date    ADHD (attention deficit hyperactivity disorder)     Anxiety     Depression     Schizo-affective schizophrenia (HCC)        FAMILY/SOCIAL HISTORY:  History reviewed. No pertinent family history.  Social History     Socioeconomic History    Marital status: Single     Spouse name: Not on file    Number of children: Not on file    Years of education: Not on file    Highest education level: Not on file   Occupational History    Not on file   Tobacco Use    Smoking status: Some Days     Types: Cigarettes    Smokeless tobacco: Never   Vaping Use    Vaping status: Never Used   Substance and Sexual Activity    Alcohol use: Not Currently    Drug use: Not Currently     Comment: previous marijuan use    Sexual activity: Yes   Other Topics Concern    Not on file   Social History Narrative    Not on file     Social Determinants of Health     Financial Resource Strain: Low Risk  (7/7/2022)    Overall Financial Resource Strain (CARDIA)     Difficulty of Paying Living Expenses: Not very hard   Food Insecurity: Patient Declined (12/18/2024)    Hunger Vital Sign     Worried About Running Out of Food in the Last Year: Patient declined     Ran Out of Food in the Last Year: Patient declined   Transportation Needs: Patient Declined (12/18/2024)    PRAPARE -

## 2024-12-23 NOTE — CARE COORDINATION
Name: Lisy Colin    : 2000    Auth number: [id number]     Discharge Date: 2024    Destination: Home    Discharge Medications:      Medication List        START taking these medications      QUEtiapine 100 MG tablet  Commonly known as: SEROQUEL  Take 1 tablet by mouth 2 times daily  Notes to patient: Clears thoughts             CONTINUE taking these medications      melatonin 3 MG Tabs tablet  Take 1 tablet by mouth nightly  Notes to patient: Sleep aid             STOP taking these medications      risperiDONE 1 MG disintegrating tablet  Commonly known as: RISPERDAL M-TABS               Where to Get Your Medications        These medications were sent to [address] [place] [address] - P [phone] - F [phone]  [address] [place] [address]      Phone: [phone]   QUEtiapine 100 MG tablet         Follow Up Appointment: [facility]  [address] [place] [id number] [phone]    Follow up on 2025  You have a mental health appointment at 8:30 am

## 2024-12-23 NOTE — PLAN OF CARE
Problem: Risk for Elopement  Goal: Patient will not exit the unit/facility without proper excort  Note: PT has not attempted to leave the unit and has remained in behavioral control.      Problem: Anxiety  Goal: Will report anxiety at manageable levels  Description: INTERVENTIONS:  1. Administer medication as ordered  2. Teach and rehearse alternative coping skills  3. Provide emotional support with 1:1 interaction with staff  Note: PT denies any depression or anxiety. PT appears anxious and preoccupied. PT slow to respond to assessment questions. PT remains aloof to self on unit and declined unit programming. PT did take HS medications with some encouragement.
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
## 2024-12-23 NOTE — DISCHARGE INSTRUCTIONS
Information:  Medications:   Medication summary provided   I understand that I should take only the medications on my list.     -why and when I need to take each medicine.     -which side effects to watch for.     -that I should carry my medication information at all times in case of     Emergency situations.    I will take all of my medicines to follow up appointments.     -check with my physician or pharmacist before taking any new    Medication, over the counter product or drink alcohol.    -Ask about food, drug or dietary supplement interactions.    -discard old lists and update records with medication providers.    Notify Physician:  Notify physician if you notice:   Always call 911 if you feel your life is in danger  In case of an emergency call 911 immediately!  If 911 is not available call your local emergency medical system for help    Behavioral Health Follow Up:  Original Referral Source: [facility]  Discharge Diagnosis: Psychosis, unspecified psychosis type (HCC) [F29]  Acute psychosis (HCC) [F23]  Recommendations for Level of Care: Follow up  Patient status at discharge: Stable   My hospital  was: [facility]   Aftercare plan faxed: Tami   -faxed by: Staff    -date: 12/23/23   -time: 1600  Prescriptions:  at [facility]     Smoking: Quit Smoking.   Call the [employer]'s smoking quitline at 5-605-66M-QUIT  Know the signs of a heart attack   If you have any of the following symptoms call 911 immediately, do not wait more    Than five minutes.    1. Pressure, fullness and/ or squeezing in the center of the chest spreading to    The jaw, neck or shoulder.    2. Chest discomfort with light headedness, fainting, sweating, nausea or    Shortness of breath.   3. Upper abdominal pressure or discomfort.   4. Lower chest pain, back pain, unusual fatigue, shortness of breath, nausea   Or dizziness.     General Information:   Questions regarding your bill: Call HELP program ([phone])

## 2024-12-23 NOTE — TRANSITION OF CARE
type     Discharge Plan/Destination: Home     Discharge Medication List and Instructions:      Medication List        START taking these medications      QUEtiapine 100 MG tablet  Commonly known as: SEROQUEL  Take 1 tablet by mouth 2 times daily  Notes to patient: Clears thoughts             CONTINUE taking these medications      melatonin 3 MG Tabs tablet  Take 1 tablet by mouth nightly  Notes to patient: Sleep aid             STOP taking these medications      risperiDONE 1 MG disintegrating tablet  Commonly known as: RISPERDAL M-TABS               Where to Get Your Medications        These medications were sent to [facility] [id number] - [place], [place] - [address] [facility] - P [phone] - F [phone]  [address] [facility] [address]      Phone: [phone]   QUEtiapine 100 MG tablet         Unresulted Labs (24h ago, onward)      None            To obtain results of studies pending at discharge, please contact [phone]    Follow-up Information    None          Advanced Directive:   Does the patient have an appointed surrogate decision maker? No  Does the patient have a Medical Advance Directive? No  Does the patient have a Psychiatric Advance Directive? No  If the patient does not have a surrogate or Medical Advance Directive AND Psychiatric Advance Directive, the patient was offered information on these advance directives Patient declined to complete    Patient Instructions: Please continue all medications until otherwise directed by physician.  Yes    Tobacco Cessation Discharge Plan:   Is the patient a tobacco user  and needs referral for tobacco cessation? Yes  Patient referred to the following for tobacco cessation with an appointment? Patient refused  Patient was offered medication to assist with tobacco cessation at discharge? Patient refused    Alcohol/Substance Abuse Discharge Plan:   Does the patient have a history of substance/alcohol abuse and requires a referral for

## 2025-01-14 ENCOUNTER — HOSPITAL ENCOUNTER (INPATIENT)
Age: 25
LOS: 6 days | Discharge: HOME | DRG: 750 | End: 2025-01-20
Attending: EMERGENCY MEDICINE | Admitting: PSYCHIATRY & NEUROLOGY
Payer: COMMERCIAL

## 2025-01-14 DIAGNOSIS — F23 ACUTE PSYCHOSIS (HCC): Primary | ICD-10-CM

## 2025-01-14 LAB
ALBUMIN SERPL-MCNC: 4.5 G/DL (ref 3.5–5.2)
ALP SERPL-CCNC: 61 U/L (ref 35–104)
ALT SERPL-CCNC: 13 U/L (ref 10–35)
AMPHET UR QL SCN: NEGATIVE
ANION GAP SERPL CALCULATED.3IONS-SCNC: 9 MMOL/L (ref 9–16)
APAP SERPL-MCNC: <5 UG/ML (ref 10–30)
AST SERPL-CCNC: 16 U/L (ref 10–35)
BARBITURATES UR QL SCN: NEGATIVE
BASOPHILS # BLD: 0.1 K/UL (ref 0–0.2)
BASOPHILS NFR BLD: 1 % (ref 0–2)
BENZODIAZ UR QL: NEGATIVE
BILIRUB SERPL-MCNC: 0.4 MG/DL (ref 0–1.2)
BUN SERPL-MCNC: 16 MG/DL (ref 6–20)
CALCIUM SERPL-MCNC: 9.2 MG/DL (ref 8.6–10.4)
CANNABINOIDS UR QL SCN: POSITIVE
CHLORIDE SERPL-SCNC: 105 MMOL/L (ref 98–107)
CO2 SERPL-SCNC: 24 MMOL/L (ref 20–31)
COCAINE UR QL SCN: NEGATIVE
CREAT SERPL-MCNC: 0.7 MG/DL (ref 0.7–1.2)
EOSINOPHIL # BLD: 0 K/UL (ref 0–0.4)
EOSINOPHILS RELATIVE PERCENT: 1 % (ref 0–4)
ERYTHROCYTE [DISTWIDTH] IN BLOOD BY AUTOMATED COUNT: 12.9 % (ref 11.5–14.9)
ETHANOL PERCENT: NORMAL %
ETHANOLAMINE SERPL-MCNC: <10 MG/DL (ref 0–0.08)
FENTANYL UR QL: POSITIVE
GFR, ESTIMATED: >90 ML/MIN/1.73M2
GLUCOSE SERPL-MCNC: 95 MG/DL (ref 74–99)
HCG SERPL QL: NEGATIVE
HCT VFR BLD AUTO: 39.9 % (ref 36–46)
HGB BLD-MCNC: 13.5 G/DL (ref 12–16)
LYMPHOCYTES NFR BLD: 1.9 K/UL (ref 1–4.8)
LYMPHOCYTES RELATIVE PERCENT: 21 % (ref 24–44)
MAGNESIUM SERPL-MCNC: 2.4 MG/DL (ref 1.6–2.6)
MCH RBC QN AUTO: 31.7 PG (ref 26–34)
MCHC RBC AUTO-ENTMCNC: 33.8 G/DL (ref 31–37)
MCV RBC AUTO: 93.8 FL (ref 80–100)
METHADONE UR QL: NEGATIVE
MONOCYTES NFR BLD: 0.7 K/UL (ref 0.1–1.3)
MONOCYTES NFR BLD: 8 % (ref 1–7)
NEUTROPHILS NFR BLD: 69 % (ref 36–66)
NEUTS SEG NFR BLD: 6.1 K/UL (ref 1.3–9.1)
OPIATES UR QL SCN: NEGATIVE
OXYCODONE UR QL SCN: NEGATIVE
PCP UR QL SCN: NEGATIVE
PLATELET # BLD AUTO: 288 K/UL (ref 150–450)
PMV BLD AUTO: 7.4 FL (ref 6–12)
POTASSIUM SERPL-SCNC: 4.3 MMOL/L (ref 3.7–5.3)
PROT SERPL-MCNC: 7.3 G/DL (ref 6.6–8.7)
RBC # BLD AUTO: 4.26 M/UL (ref 4–5.2)
SALICYLATES SERPL-MCNC: <1 MG/DL (ref 0–10)
SODIUM SERPL-SCNC: 138 MMOL/L (ref 136–145)
TEST INFORMATION: ABNORMAL
WBC OTHER # BLD: 8.8 K/UL (ref 3.5–11)

## 2025-01-14 PROCEDURE — 80053 COMPREHEN METABOLIC PANEL: CPT

## 2025-01-14 PROCEDURE — 6370000000 HC RX 637 (ALT 250 FOR IP)

## 2025-01-14 PROCEDURE — 80179 DRUG ASSAY SALICYLATE: CPT

## 2025-01-14 PROCEDURE — 6370000000 HC RX 637 (ALT 250 FOR IP): Performed by: NURSE PRACTITIONER

## 2025-01-14 PROCEDURE — 84703 CHORIONIC GONADOTROPIN ASSAY: CPT

## 2025-01-14 PROCEDURE — 83735 ASSAY OF MAGNESIUM: CPT

## 2025-01-14 PROCEDURE — 80307 DRUG TEST PRSMV CHEM ANLYZR: CPT

## 2025-01-14 PROCEDURE — 36415 COLL VENOUS BLD VENIPUNCTURE: CPT

## 2025-01-14 PROCEDURE — 99223 1ST HOSP IP/OBS HIGH 75: CPT

## 2025-01-14 PROCEDURE — 80143 DRUG ASSAY ACETAMINOPHEN: CPT

## 2025-01-14 PROCEDURE — 2040000000 HC PSYCH ICU R&B

## 2025-01-14 PROCEDURE — 85025 COMPLETE CBC W/AUTO DIFF WBC: CPT

## 2025-01-14 PROCEDURE — G0480 DRUG TEST DEF 1-7 CLASSES: HCPCS

## 2025-01-14 PROCEDURE — 99285 EMERGENCY DEPT VISIT HI MDM: CPT

## 2025-01-14 RX ORDER — HALOPERIDOL 5 MG/ML
5 INJECTION INTRAMUSCULAR EVERY 6 HOURS PRN
Status: DISCONTINUED | OUTPATIENT
Start: 2025-01-14 | End: 2025-01-20 | Stop reason: HOSPADM

## 2025-01-14 RX ORDER — POLYETHYLENE GLYCOL 3350 17 G/17G
17 POWDER, FOR SOLUTION ORAL DAILY PRN
Status: DISCONTINUED | OUTPATIENT
Start: 2025-01-14 | End: 2025-01-20 | Stop reason: HOSPADM

## 2025-01-14 RX ORDER — HALOPERIDOL 5 MG/1
5 TABLET ORAL EVERY 6 HOURS PRN
Status: DISCONTINUED | OUTPATIENT
Start: 2025-01-14 | End: 2025-01-20 | Stop reason: HOSPADM

## 2025-01-14 RX ORDER — QUETIAPINE FUMARATE 200 MG/1
200 TABLET, FILM COATED ORAL NIGHTLY
Status: DISCONTINUED | OUTPATIENT
Start: 2025-01-14 | End: 2025-01-15

## 2025-01-14 RX ORDER — ACETAMINOPHEN 325 MG/1
650 TABLET ORAL EVERY 4 HOURS PRN
Status: DISCONTINUED | OUTPATIENT
Start: 2025-01-14 | End: 2025-01-20 | Stop reason: HOSPADM

## 2025-01-14 RX ORDER — HYDROXYZINE HYDROCHLORIDE 50 MG/1
50 TABLET, FILM COATED ORAL 3 TIMES DAILY PRN
Status: DISCONTINUED | OUTPATIENT
Start: 2025-01-14 | End: 2025-01-20 | Stop reason: HOSPADM

## 2025-01-14 RX ORDER — DIPHENHYDRAMINE HYDROCHLORIDE 50 MG/ML
50 INJECTION INTRAMUSCULAR; INTRAVENOUS EVERY 6 HOURS PRN
Status: DISCONTINUED | OUTPATIENT
Start: 2025-01-14 | End: 2025-01-20 | Stop reason: HOSPADM

## 2025-01-14 RX ORDER — POLYETHYLENE GLYCOL 3350 17 G
2 POWDER IN PACKET (EA) ORAL
Status: DISCONTINUED | OUTPATIENT
Start: 2025-01-14 | End: 2025-01-20 | Stop reason: HOSPADM

## 2025-01-14 RX ORDER — MAGNESIUM HYDROXIDE/ALUMINUM HYDROXICE/SIMETHICONE 120; 1200; 1200 MG/30ML; MG/30ML; MG/30ML
30 SUSPENSION ORAL EVERY 6 HOURS PRN
Status: DISCONTINUED | OUTPATIENT
Start: 2025-01-14 | End: 2025-01-20 | Stop reason: HOSPADM

## 2025-01-14 RX ORDER — IBUPROFEN 400 MG/1
400 TABLET, FILM COATED ORAL EVERY 6 HOURS PRN
Status: DISCONTINUED | OUTPATIENT
Start: 2025-01-14 | End: 2025-01-20 | Stop reason: HOSPADM

## 2025-01-14 RX ADMIN — NICOTINE POLACRILEX 2 MG: 2 LOZENGE ORAL at 14:19

## 2025-01-14 RX ADMIN — NICOTINE POLACRILEX 2 MG: 2 LOZENGE ORAL at 12:42

## 2025-01-14 RX ADMIN — NICOTINE POLACRILEX 2 MG: 2 LOZENGE ORAL at 17:22

## 2025-01-14 RX ADMIN — Medication 3 MG: at 21:01

## 2025-01-14 RX ADMIN — QUETIAPINE FUMARATE 200 MG: 200 TABLET ORAL at 21:02

## 2025-01-14 RX ADMIN — NICOTINE POLACRILEX 2 MG: 2 LOZENGE ORAL at 19:56

## 2025-01-14 RX ADMIN — HYDROXYZINE HYDROCHLORIDE 50 MG: 50 TABLET ORAL at 21:01

## 2025-01-14 ASSESSMENT — SLEEP AND FATIGUE QUESTIONNAIRES
DO YOU USE A SLEEP AID: NO
AVERAGE NUMBER OF SLEEP HOURS: 4
DO YOU HAVE DIFFICULTY SLEEPING: NO

## 2025-01-14 ASSESSMENT — PAIN - FUNCTIONAL ASSESSMENT: PAIN_FUNCTIONAL_ASSESSMENT: NONE - DENIES PAIN

## 2025-01-14 ASSESSMENT — LIFESTYLE VARIABLES
HOW MANY STANDARD DRINKS CONTAINING ALCOHOL DO YOU HAVE ON A TYPICAL DAY: PATIENT DOES NOT DRINK
HOW OFTEN DO YOU HAVE A DRINK CONTAINING ALCOHOL: PATIENT DECLINED
HOW OFTEN DO YOU HAVE A DRINK CONTAINING ALCOHOL: NEVER
HOW OFTEN DO YOU HAVE A DRINK CONTAINING ALCOHOL: NEVER
HOW MANY STANDARD DRINKS CONTAINING ALCOHOL DO YOU HAVE ON A TYPICAL DAY: PATIENT DOES NOT DRINK
HOW MANY STANDARD DRINKS CONTAINING ALCOHOL DO YOU HAVE ON A TYPICAL DAY: PATIENT DECLINED

## 2025-01-14 ASSESSMENT — PATIENT HEALTH QUESTIONNAIRE - PHQ9
2. FEELING DOWN, DEPRESSED OR HOPELESS: NOT AT ALL
1. LITTLE INTEREST OR PLEASURE IN DOING THINGS: NOT AT ALL
SUM OF ALL RESPONSES TO PHQ QUESTIONS 1-9: 0
SUM OF ALL RESPONSES TO PHQ QUESTIONS 1-9: 0
SUM OF ALL RESPONSES TO PHQ9 QUESTIONS 1 & 2: 0
SUM OF ALL RESPONSES TO PHQ QUESTIONS 1-9: 0
SUM OF ALL RESPONSES TO PHQ QUESTIONS 1-9: 0

## 2025-01-14 NOTE — PROGRESS NOTES
Pharmacy Medication History Note      List of current medications patient is taking is complete.    Source of information: dispense report, [facility] discharge summary 12/23, [facility] ([phone])    Changes made to medication list:  Medications removed (include reason, ex. therapy complete or physician discontinued, noncompliance):  None     Medications flagged for provider review:  None     Medications added/doses adjusted:  Quetiapine dose adjusted to 300 mg nightly    Other notes (ex. Recent course of antibiotics, Coumadin dosing):  OARRS negative  Patient admitted to [facility] 12/18-12/23; quetiapine dose changed by provider outpatient since then      Current Home Medication List at Time of Admission:  Prior to Admission medications    Medication Sig Start Date End Date Taking? Authorizing Provider   QUEtiapine (SEROQUEL) 100 MG tablet Take 1 tablet by mouth 2 times daily  Patient taking differently: Take 3 tablets by mouth at bedtime 12/23/24   Sebastián Siu MD   melatonin 3 MG TABS tablet Take 1 tablet by mouth nightly 10/3/24   Aaron Glass MD         Please let me know if you have any questions about this encounter. Thank you!    Electronically signed by Albertina Meraz RPH on 1/14/2025 at 10:28 AM
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
## 2025-01-14 NOTE — ED NOTES
The following labs labeled with pt sticker and tubed to lab:     [] Blue     [x] Lavender   [] on ice  [x] Green/yellow  [] Green/black [] on ice  [x] Yellow  [] Red  [] Pink      [] COVID-19 swab    [] Rapid  [] PCR  [] Flu swab  [] Peds Viral Panel     [x] Urine Sample  [] Pelvic Cultures  [] Blood Cultures

## 2025-01-14 NOTE — ED PROVIDER NOTES
components:    Cannabinoid Scrn, Ur POSITIVE (*)     Fentanyl, Ur POSITIVE (*)     All other components within normal limits   COMPREHENSIVE METABOLIC PANEL   SALICYLATE LEVEL   ETHANOL   MAGNESIUM   HCG, SERUM, QUALITATIVE       Vitals Reviewed:    Vitals:    01/14/25 0706 01/14/25 1317   BP: 125/86 (!) 145/96   Pulse: (!) 112 (!) 110   Resp: 15 14   Temp: 98.2 °F (36.8 °C) 98.2 °F (36.8 °C)   TempSrc: Oral    SpO2: 100% 100%   Weight: 63.5 kg (140 lb) 63.5 kg (140 lb)   Height: 1.6 m (5' 2.99\") 1.6 m (5' 2.99\")     MEDICATIONS GIVEN TO PATIENT THIS ENCOUNTER:  Orders Placed This Encounter   Medications    acetaminophen (TYLENOL) tablet 650 mg    ibuprofen (ADVIL;MOTRIN) tablet 400 mg    polyethylene glycol (GLYCOLAX) packet 17 g    aluminum & magnesium hydroxide-simethicone (MAALOX) 200-200-20 MG/5ML suspension 30 mL    hydrOXYzine HCl (ATARAX) tablet 50 mg    melatonin tablet 3 mg    nicotine polacrilex (COMMIT) lozenge 2 mg    AND Linked Order Group     haloperidol lactate (HALDOL) injection 5 mg     diphenhydrAMINE (BENADRYL) injection 50 mg    haloperidol (HALDOL) tablet 5 mg    QUEtiapine (SEROQUEL) tablet 200 mg     DISCHARGE PRESCRIPTIONS:  Current Discharge Medication List        PHYSICIAN CONSULTS ORDERED THIS ENCOUNTER:  IP CONSULT TO INTERNAL MEDICINE  FINAL IMPRESSION      1. Acute psychosis (HCC)          DISPOSITION/PLAN   DISPOSITION Admitted 01/14/2025 10:12:53 AM               OUTPATIENT FOLLOW UP THE PATIENT:  No follow-up provider specified.    DO Erik Koch Nathan R, DO  01/14/25 1620

## 2025-01-14 NOTE — PROGRESS NOTES
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
## 2025-01-14 NOTE — ED NOTES
RN attempted to get blood work from patient. Patient reports that we can get test results from urine and that \"she is a witch and we can't take her blood\". RN and SW tried to explain process to patient but patient still unwilling to get blood taken.

## 2025-01-14 NOTE — BH NOTE
Patient given tobacco quitline number 31115749867 at this time, refusing to call at this time, states \" I just dont want to quit now\"- patient given information as to the dangers of long term tobacco use. Continue to reinforce the importance of tobacco cessation.

## 2025-01-14 NOTE — BH NOTE
[facility]  Admission Note     Admission Type:   Admission Type: Involuntary    Reason for admission:  Reason for Admission: Patient was acting irratic, delusional, not taking care of self. States that she is seeing \"bad spirits\" and that she is a witch. History of drug use.      Addictive Behavior:   Addictive Behavior  In the Past 3 Months, Have You Felt or Has Someone Told You That You Have a Problem With  : None    Medical Problems:   Past Medical History:   Diagnosis Date    ADHD (attention deficit hyperactivity disorder)     Anxiety     Depression     Schizo-affective schizophrenia (HCC)        Status EXAM:  Mental Status and Behavioral Exam  Normal: No  Level of Assistance: Independent/Self  Facial Expression: Flat  Affect: Blunt  Level of Consciousness: Alert  Frequency of Checks: 4 times per hour, close  Mood:Normal: No  Mood: Anxious, Suspicious  Motor Activity:Normal: No  Motor Activity: Decreased  Eye Contact: Fair  Observed Behavior: Preoccupied  Sexual Misconduct History: Past - no  Preception: [place] to person, [place] to time, [place] to place, [place] to situation  Attention:Normal: No  Attention: Unable to concentrate  Thought Processes: Blocking  Thought Content:Normal: No  Thought Content: Paranoia  Depression Symptoms: Impaired concentration  Anxiety Symptoms: Generalized  Linda Symptoms: No problems reported or observed.  Hallucinations: Unable to assess (Pt denies, but appears preoccupied)  Memory:Normal: No  Memory: Poor recent, Poor remote  Insight and Judgment: No  Insight and Judgment: Poor judgment, Poor insight    Tobacco Screening:  Practical Counseling, on admission, jonatan X, if applicable and completed (first 3 are required if patient doesn't refuse):            ( ) Recognizing danger situations (included triggers and roadblocks)                    ( ) Coping skills (new ways to manage stress,relaxation techniques, changing routine, distraction)

## 2025-01-15 PROCEDURE — 6370000000 HC RX 637 (ALT 250 FOR IP): Performed by: NURSE PRACTITIONER

## 2025-01-15 PROCEDURE — 6370000000 HC RX 637 (ALT 250 FOR IP): Performed by: PSYCHIATRY & NEUROLOGY

## 2025-01-15 PROCEDURE — 6370000000 HC RX 637 (ALT 250 FOR IP)

## 2025-01-15 PROCEDURE — APPSS30 APP SPLIT SHARED TIME 16-30 MINUTES

## 2025-01-15 PROCEDURE — 2040000000 HC PSYCH ICU R&B

## 2025-01-15 RX ORDER — RISPERIDONE 1 MG/1
1 TABLET, ORALLY DISINTEGRATING ORAL 2 TIMES DAILY
Status: DISCONTINUED | OUTPATIENT
Start: 2025-01-15 | End: 2025-01-16

## 2025-01-15 RX ADMIN — NICOTINE POLACRILEX 2 MG: 2 LOZENGE ORAL at 08:16

## 2025-01-15 RX ADMIN — NICOTINE POLACRILEX 2 MG: 2 LOZENGE ORAL at 18:06

## 2025-01-15 RX ADMIN — Medication 3 MG: at 20:58

## 2025-01-15 RX ADMIN — NICOTINE POLACRILEX 2 MG: 2 LOZENGE ORAL at 14:33

## 2025-01-15 RX ADMIN — HYDROXYZINE HYDROCHLORIDE 50 MG: 50 TABLET ORAL at 23:05

## 2025-01-15 RX ADMIN — NICOTINE POLACRILEX 2 MG: 2 LOZENGE ORAL at 11:55

## 2025-01-15 RX ADMIN — NICOTINE POLACRILEX 2 MG: 2 LOZENGE ORAL at 13:18

## 2025-01-15 RX ADMIN — NICOTINE POLACRILEX 2 MG: 2 LOZENGE ORAL at 15:44

## 2025-01-15 RX ADMIN — NICOTINE POLACRILEX 2 MG: 2 LOZENGE ORAL at 19:55

## 2025-01-15 RX ADMIN — NICOTINE POLACRILEX 2 MG: 2 LOZENGE ORAL at 09:59

## 2025-01-15 RX ADMIN — RISPERIDONE 1 MG: 1 TABLET, ORALLY DISINTEGRATING ORAL at 20:58

## 2025-01-15 RX ADMIN — NICOTINE POLACRILEX 2 MG: 2 LOZENGE ORAL at 22:28

## 2025-01-15 RX ADMIN — NICOTINE POLACRILEX 2 MG: 2 LOZENGE ORAL at 20:58

## 2025-01-15 ASSESSMENT — PAIN SCALES - GENERAL: PAINLEVEL_OUTOF10: 0

## 2025-01-15 NOTE — BH NOTE
Patient states she feels she is not getting enough protein or calories. She is asking for ensures to be added to her tray, and to speak with dietician. Patient informed we can try to do that tomorrow.

## 2025-01-15 NOTE — PROGRESS NOTES
Daily Progress Note  1/15/2025    Patient Name: Lisy Colin    CHIEF COMPLAINT:  Acute Psychosis          SUBJECTIVE:      Patient is seen today for a follow up assessment. Vitals and labs reviewed and patient's blood pressure and pulse do seem to fluctuate.  She however is likely withdrawing from Fentanyl.  Lisy was compliant with her scheduled Seroquel.  She has remained behavioral control and has not required emergency medications in the past 24 hours.  Lisy is seen on unit milieu today.  She is initially extremely paranoid and dismissive of this writer.  She states, \"I do not want to see my nurse practitioner, I want to see the doctor.\"  However she dismissed attending physician as well.  She is very focused on seeing other attending psychiatrist, Dr. Siu.  Attempted to explain to patient that she would not be seeing Dr. Siu and needed to either cooperate with this writer or Dr. Pena.  She states \"that Atarax almost killed me.\"  Did attempt to make a therapeutic alliance with patient and told her we can discontinue the Atarax.  She then states, \"I need something during the daytime to keep me awake.\"  Asked patient if she has ever taken anything before that was helpful and she states \"like what?\"  Asked patient if she has ever taken an SSRI before and she states, \"I'm not sure.\"  We did discuss how it could potentially help with anxiety however historically patient has always been psychotic and taken antipsychotic medications at past hospitalizations.  She appears to lack significant insight into her mental illness.  She remains paranoid with thought blocking.  At this time she continues to require inpatient hospitalization for her safety and stability.    Appetite:  [x] Normal/Adequate/Unchanged  [] Increased  [] Decreased      Sleep:       [] Normal/Adequate/Unchanged  [x] Fair  [] Poor      Group Attendance on Unit:   [] Yes  [] Selectively    [x] No    Medication Side Effects:  Patient

## 2025-01-15 NOTE — GROUP NOTE
Group Therapy Note    Date: 1/15/2025    Group Start Time: 1440  Group End Time: 1520  Group Topic: Recreational    ST[facility] ERICKA Nacho Fortune        Group Therapy Note    Attendees: 1/2       Patient's Goal:  Open recreation; Offered patients a variety of group, individual, and 1:1 activities including art, music, games, and talk time. Goals to increase self-expression; increase sense of community; Increase socialization; Demonstrate positive use of time; Increase rapport with staff     Notes:  Patient declined to engage in any activities. Shortly after asked if this writer had anything other than crayons to use and asked for markers or colored pencils. Brought patient markers. Patient declined for this writer to sit with her and work on art. This writer sat on periphery away from patient. Offered to play patients preferred music from [employer] and patient declined. Attempted to engage patient periodically and patient refused to engage often ignoring this writer. Throughout this time writer remained in day area and patient was not observed to utilize the markers she had requested at all. Patient at one point approached this writer and asked if detail about this writer was doing and did in this job. Patient asked this writers name and to see badge to confirm identity. Then stated the troy on the badge was not this writer. Validated to patient that it was this writer and was about 5 years old which Patient appeared accepting of though suspicious.    Status After Intervention:  Unchanged    Participation Level: Minimal    Participation Quality: Resistant      Speech:  normal      Thought Process/Content: Delusional/paranoid      Affective Functioning: Congruent      Mood: dysphoric      Level of consciousness:  Alert      Response to Learning: Resistant      Endings: None Reported    Modes of Intervention: Support, Socialization, Exploration,

## 2025-01-15 NOTE — PROGRESS NOTES
Behavioral Services  Medicare Certification Upon Admission    I certify that this patient's inpatient psychiatric hospital admission is medically necessary for:    [x] (1) Treatment which could reasonably be expected to improve this patient's condition,       [x] (2) Or for diagnostic study;     AND     [x](2) The inpatient psychiatric services are provided while the individual is under the care of a physician and are included in the individualized plan of care.    Estimated length of stay/service 4-7 days    Plan for post-hospital care home with outpatient [facility] f/u    Electronically signed by RAJESH LOREDO MD on 1/15/2025 at 6:58 AM

## 2025-01-15 NOTE — GROUP NOTE
Group Therapy Note    Date: 1/15/2025    Group Start Time: 1100  Group End Time: 1220  Group Topic: Recreational    [facility] MANUELI Nacho Fortune        Group Therapy Note    Attendees: 2/2       Patient's Goal:  Open recreation; Offered patients a variety of group, individual, and 1:1 activities including art, music, games, and talk time. Goals to increase self-expression; increase sense of community; Increase socialization; Demonstrate positive use of time; Increase rapport with staff    Notes:  Patient stated she wanted to sit in day room and work on art and listen to music. Sat with patient at a table and patient states \"wait why are you doing this?\" Stated to patient this writer was going to sit and do art with her while they listened to music. Patient expressed she was not comfortable with this and wished to sit alone. Krystian shared her preferred music. Did not wish to identify songs but asked this writer to put on a playlist. Asked the music to be turned up multiple times at which point the music became too loud for the milieu. Explained this to patient and offered to move a table near the music source (television). Patient expressed understanding and agreed to this. Did request a change in music type later and was appropriate. Patient was calmly working on art throughout this time.    Status After Intervention:  Improved    Participation Level: Minimal    Participation Quality: Resistant      Speech:  normal      Thought Process/Content: Logical/ potential paranoia      Affective Functioning: Congruent      Mood: euthymic      Level of consciousness:  Alert      Response to Learning: Progressing to goal      Endings: None Reported    Modes of Intervention: Support, Socialization, Exploration, Activity, Media, and Reality-testing      Discipline Responsible: Psychoeducational Specialist      Signature:  Nacho Bianchi

## 2025-01-15 NOTE — GROUP NOTE
Group Therapy Note    Date: 1/15/2025    Group Start Time: 1000  Group End Time: 1030  Group Topic: Psychoeducation    DELLA BHMarietta Martinez MSW, MARTITA        Group Therapy Note    Attendees: 0/3       Patient was offered group therapy today but declined to participate despite encouragement from staff.  1:1 was offered.       Signature:  GANGA Pope LSW

## 2025-01-15 NOTE — GROUP NOTE
Group Therapy Note    Date: 1/14/2025    Group Start Time: 2000  Group End Time: 2015  Group Topic: Wrap-Up    Renetta Saunders, RN        Group Therapy Note    Attendees: 1/2       patient refused to attend wrap up group at 2000 after encouragement from staff.  1:1 talk time provided as alternative to group session

## 2025-01-15 NOTE — PLAN OF CARE
Problem: Risk for Elopement  Goal: Patient will not exit the unit/facility without proper excort  Outcome: Progressing  No attempts or verbal requests to exit unit     Problem: Linda  Goal: Will exhibit normal sleep and speech and no impulsivity  Description: INTERVENTIONS:  1. Administer medication as ordered  2. Set limits on impulsive behavior  3. Make attempts to decrease external stimuli as possible  1/15/2025 0759 by Daniella Muse RN  Outcome: Progressing  Pt controlled thus far this morning     Problem: Anxiety  Goal: Will report anxiety at manageable levels  Description: INTERVENTIONS:  1. Administer medication as ordered  2. Teach and rehearse alternative coping skills  3. Provide emotional support with 1:1 interaction with staff  Outcome: Progressing  Pt states she is not feeling anxious at this time

## 2025-01-15 NOTE — PLAN OF CARE
[facility]  Initial Interdisciplinary Treatment Plan NO      Original treatment plan Date & Time: 0900 1/15/25    Admission Type:  Admission Type: Involuntary    Reason for admission:   Reason for Admission: Patient was acting irratic, delusional, not taking care of self. States that she is seeing \"bad spirits\" and that she is a witch. History of drug use.    Estimated Length of Stay:  5-7days  Estimated Discharge Date: to be determined by physician    PATIENT STRENGTHS:  Patient Strengths:   Patient Strengths and Limitations:Limitations: Multiple barriers to leisure interests, Difficulty problem solving/relies on others to help solve problems, Lacks leisure interests, Apathetic / unmotivated  Addictive Behavior: Addictive Behavior  In the Past 3 Months, Have You Felt or Has Someone Told You That You Have a Problem With  : None  Medical Problems:  Past Medical History:   Diagnosis Date    ADHD (attention deficit hyperactivity disorder)     Anxiety     Depression     Schizo-affective schizophrenia (HCC)      Status EXAM:Mental Status and Behavioral Exam  Normal: No  Level of Assistance: Independent/Self  Facial Expression: Flat  Affect: Blunt  Level of Consciousness: Alert  Frequency of Checks: 4 times per hour, close  Mood:Normal: No  Mood: Anxious, Suspicious  Motor Activity:Normal: No  Motor Activity: Increased  Eye Contact: Fair  Observed Behavior: Preoccupied, Guarded, Hypermobile  Sexual Misconduct History: Past - no  Preception: [place] to person, [place] to time, [place] to place  Attention:Normal: No  Attention: Unable to concentrate  Thought Processes: Blocking  Thought Content:Normal: No  Thought Content: Poverty of content, Paranoia  Depression Symptoms: Impaired concentration  Anxiety Symptoms: Generalized  Linda Symptoms: No problems reported or observed.  Hallucinations: Other (comment) (denies but appears preoccupied)  Delusions: Yes  Delusions: Paranoid  Memory:Normal: No  Memory: Poor

## 2025-01-15 NOTE — GROUP NOTE
Group Refusal Note:     Patient refused to attend goals group even after encouragement from staff.  will continue to monitor and support.

## 2025-01-16 PROCEDURE — 6370000000 HC RX 637 (ALT 250 FOR IP): Performed by: NURSE PRACTITIONER

## 2025-01-16 PROCEDURE — 6370000000 HC RX 637 (ALT 250 FOR IP): Performed by: PSYCHIATRY & NEUROLOGY

## 2025-01-16 PROCEDURE — APPSS30 APP SPLIT SHARED TIME 16-30 MINUTES

## 2025-01-16 PROCEDURE — 6370000000 HC RX 637 (ALT 250 FOR IP)

## 2025-01-16 PROCEDURE — 2040000000 HC PSYCH ICU R&B

## 2025-01-16 RX ORDER — ARIPIPRAZOLE 15 MG/1
15 TABLET ORAL NIGHTLY
Status: DISCONTINUED | OUTPATIENT
Start: 2025-01-16 | End: 2025-01-17

## 2025-01-16 RX ADMIN — NICOTINE POLACRILEX 2 MG: 2 LOZENGE ORAL at 15:38

## 2025-01-16 RX ADMIN — HYDROXYZINE HYDROCHLORIDE 50 MG: 50 TABLET ORAL at 14:07

## 2025-01-16 RX ADMIN — NICOTINE POLACRILEX 2 MG: 2 LOZENGE ORAL at 07:08

## 2025-01-16 RX ADMIN — HALOPERIDOL 5 MG: 5 TABLET ORAL at 14:07

## 2025-01-16 RX ADMIN — ARIPIPRAZOLE 15 MG: 15 TABLET ORAL at 20:40

## 2025-01-16 RX ADMIN — NICOTINE POLACRILEX 2 MG: 2 LOZENGE ORAL at 08:07

## 2025-01-16 RX ADMIN — NICOTINE POLACRILEX 2 MG: 2 LOZENGE ORAL at 09:03

## 2025-01-16 RX ADMIN — Medication 3 MG: at 20:40

## 2025-01-16 RX ADMIN — NICOTINE POLACRILEX 2 MG: 2 LOZENGE ORAL at 18:12

## 2025-01-16 RX ADMIN — NICOTINE POLACRILEX 2 MG: 2 LOZENGE ORAL at 14:07

## 2025-01-16 RX ADMIN — NICOTINE POLACRILEX 2 MG: 2 LOZENGE ORAL at 19:09

## 2025-01-16 RX ADMIN — NICOTINE POLACRILEX 2 MG: 2 LOZENGE ORAL at 17:12

## 2025-01-16 RX ADMIN — RISPERIDONE 1 MG: 1 TABLET, ORALLY DISINTEGRATING ORAL at 08:07

## 2025-01-16 NOTE — GROUP NOTE
Group Therapy Note    Date: 1/16/2025    Group Start Time: 1000  Group End Time: 1030  Group Topic: Psychotherapy    [facility]        Group Therapy Note    Attendees: 0/3       Patient was offered group therapy today but declined to participate despite encouragement from staff.  1:1 was offered.       Signature:  GANGA Pope, MARTITA

## 2025-01-16 NOTE — BH NOTE
Emergency PRN Medication Administration Note:      Patient is Anxious and Disruptive as evidence by sobbing uncontrollably and loudly in the milieu, unable to redirect. Staff attempted to find and relieve the distress by Talking to patient, Refocusing on new activity, Offering suggestions, Checking for undiagnosed pain, and Administer PRN medicationsPatient is currently continuing to escalate, accepted PRN medications. Medication Administered as prescribed: (Haldol 5mg Oral). Patient Tolerated medication administration.   Will continue to monitor, offer support, and reassess.      Writer heard sobbing coming from a patient room.  Staff on unit to investigate.  Patient emerged from room covering her face and sobbing loudly into her hands.  Staff attempted to inquire what was causing the patient distress, patient refused to answer or acknowledge staff.  Medications offered as prescribed for anxiety and agitation.  Staff attempted provide talk time and alternative activity to help engage the patient, she continued to decline and sob into her hands.   NP on unit during this time to observe.   Safety checks in place q15 min per protocol and at irregular intervals.  Patient encouraged to seek staff for assistance if needed.

## 2025-01-16 NOTE — GROUP NOTE
Group Therapy Note    Date: 1/16/2025    Group Start Time: 1030  Group End Time: 1120  Group Topic: Recreational    DELLA JACKSONNacho Thomas    Recreation Group Note        Date: 1/16/2025   Start Time: 1030  End Time: 1120      Number of Participants in Group & Unit Census:  0/2    Topic: Open recreation; Offered patients a variety of group, individual, and 1:1 activities including art, music, games, and talk time.     Goal of Group:Goals to increase self-expression; increase sense of community; Increase socialization; Demonstrate positive use of time; Increase rapport with staff       Comments:     Patient did not participate in Recreation group, despite staff encouragement and explanation of benefits.  Patient remain seclusive to self.  Q15 minute safety checks maintained for patient safety and will continue to encourage patient to attend unit programming.

## 2025-01-16 NOTE — PLAN OF CARE
Problem: Anxiety  Goal: Will report anxiety at manageable levels  Description: INTERVENTIONS:  1. Administer medication as ordered  2. Teach and rehearse alternative coping skills  3. Provide emotional support with 1:1 interaction with staff  Outcome: Progressing     Problem: Psychosis  Goal: Will report no hallucinations or delusions  Description: INTERVENTIONS:  1. Administer medication as  ordered  2. Assist with reality testing to support increasing orientation  3. Assess if patient's hallucinations or delusions are encouraging self harm or harm to others and intervene as appropriate  1/16/2025 0900 by Jean Marie Corbett, RN  Outcome: Progressing     Problem: Linda  Goal: Will exhibit normal sleep and speech and no impulsivity  Description: INTERVENTIONS:  1. Administer medication as ordered  2. Set limits on impulsive behavior  3. Make attempts to decrease external stimuli as possible  1/16/2025 0900 by Jean Marie Corbett, RN  Outcome: Progressing     Patient denies suicidal thoughts, thoughts to self harm, and has remained free from self injury thus far.  Patient denies all perceptual disturbances but appears preoccupied with internal stimuli and observed engaging in self talk.  Patient isolative and aloof.  Patient reports feeling some frustration with medication changes and she feels that the doctors and nurse practitioners \"keep giving me the run around.\"   Patient encouraged to share these frustrations with the psychiatry team.   Patient encouraged to seek staff for any needs or concerns that may arise.  Safe environment maintained.  Safety checks in place q15 min per protocol.

## 2025-01-16 NOTE — PLAN OF CARE
Problem: Linda  Goal: Will exhibit normal sleep and speech and no impulsivity  Description: INTERVENTIONS:  1. Administer medication as ordered  2. Set limits on impulsive behavior  3. Make attempts to decrease external stimuli as possible  Note: PT up and down most of the night only laying down for short intervals. PT more irritable this evening but has so far been verbally redirectable. PT remains disheveled.      Problem: Psychosis  Goal: Will report no hallucinations or delusions  Description: INTERVENTIONS:  1. Administer medication as  ordered  2. Assist with reality testing to support increasing orientation  3. Assess if patient's hallucinations or delusions are encouraging self harm or harm to others and intervene as appropriate  Note: PT denies any hallucinations but appears preoccupied and suspicious of staff and peers. PT upset this evening about medication change pt did take after she initially refused a few times. PT states she is feeling great but then will tell me she doesn't feel well and needs to be in the hospital and she is not ready for discharge. PT maintained on 15 min safety checks and rounds at irregular intervals.

## 2025-01-16 NOTE — BH NOTE
Emergency Medication Follow-Up Note:    PRN medication of (Haldol 5mg oral ) was effective as evidence by Patient regain behavioral control, absence of behavior warranting emergency medication, able to eat meal without issue. Patient denies medication side effects. Will continue to monitor and provide support as needed.

## 2025-01-16 NOTE — PROGRESS NOTES
Daily Progress Note  1/16/2025    Patient Name: Lisy Colin    CHIEF COMPLAINT:  Acute Psychosis          SUBJECTIVE:      Patient is seen today for a follow up assessment. Vitals and labs reviewed and appear within normal limits.  Lisy is compliant with scheduled medications. She remains behaviorally in control and has not required emergency medications in the past 24 hours.  Lisy is seen at bedside.  She is resting.  She states she did not sleep well last night so she continues to feel fatigued today.  Patient is likely catching up on sleep she had missed prior to hospitalization.  Thought blocking remains significant however patient seems less paranoid of writer today.  She does endorse both depression and anxiety.  She denies suicidal ideation. She denies homicidal ideation.  She does admit to \"some\" auditory hallucinations today.  She denies visual hallucinations.  She denies any side effects to medications.      Shortly after speaking with patient, she came out of her room uncontrollably sobbing, unable to talk.  She will not speak with staff to let us know what is wrong however she is accepting of emergency Haldol and Atarax at this time to calm her down.      Appetite:  [x] Normal/Adequate/Unchanged  [] Increased  [] Decreased      Sleep:       [] Normal/Adequate/Unchanged  [] Fair  [x] Poor      Group Attendance on Unit:   [] Yes  [] Selectively    [x] No    Medication Side Effects:  Patient denies any medication side effects at the time of assessment.         Mental Status Exam  Level of consciousness: Somnolent but responds to verbal stimuli  Appearance: Appropriate attire for setting, resting in bed, with poor grooming and hygiene.   Behavior/Motor: Psychomotor slowing  Attitude toward examiner: Cooperative, fair eye contact, seems internally stimulated  Speech: Latent, low volume  Mood:  Patient reports \"I'm a little depressed today\".   Affect: Constricted  Thought processes: Thought blocking

## 2025-01-17 PROCEDURE — 6370000000 HC RX 637 (ALT 250 FOR IP): Performed by: NURSE PRACTITIONER

## 2025-01-17 PROCEDURE — 99222 1ST HOSP IP/OBS MODERATE 55: CPT

## 2025-01-17 PROCEDURE — 93005 ELECTROCARDIOGRAM TRACING: CPT

## 2025-01-17 PROCEDURE — 6370000000 HC RX 637 (ALT 250 FOR IP): Performed by: PSYCHIATRY & NEUROLOGY

## 2025-01-17 PROCEDURE — 6370000000 HC RX 637 (ALT 250 FOR IP)

## 2025-01-17 PROCEDURE — APPSS30 APP SPLIT SHARED TIME 16-30 MINUTES: Performed by: NURSE PRACTITIONER

## 2025-01-17 PROCEDURE — 2040000000 HC PSYCH ICU R&B

## 2025-01-17 RX ORDER — QUETIAPINE FUMARATE 200 MG/1
200 TABLET, FILM COATED ORAL NIGHTLY
Status: DISCONTINUED | OUTPATIENT
Start: 2025-01-17 | End: 2025-01-20 | Stop reason: HOSPADM

## 2025-01-17 RX ORDER — RISPERIDONE 1 MG/1
2 TABLET, ORALLY DISINTEGRATING ORAL 2 TIMES DAILY
Status: DISCONTINUED | OUTPATIENT
Start: 2025-01-17 | End: 2025-01-20

## 2025-01-17 RX ADMIN — HALOPERIDOL 5 MG: 5 TABLET ORAL at 16:57

## 2025-01-17 RX ADMIN — NICOTINE POLACRILEX 2 MG: 2 LOZENGE ORAL at 06:25

## 2025-01-17 RX ADMIN — NICOTINE POLACRILEX 2 MG: 2 LOZENGE ORAL at 16:04

## 2025-01-17 RX ADMIN — NICOTINE POLACRILEX 2 MG: 2 LOZENGE ORAL at 18:14

## 2025-01-17 RX ADMIN — QUETIAPINE FUMARATE 200 MG: 200 TABLET ORAL at 22:31

## 2025-01-17 RX ADMIN — HYDROXYZINE HYDROCHLORIDE 50 MG: 50 TABLET ORAL at 16:57

## 2025-01-17 RX ADMIN — RISPERIDONE 2 MG: 1 TABLET, ORALLY DISINTEGRATING ORAL at 15:42

## 2025-01-17 RX ADMIN — NICOTINE POLACRILEX 2 MG: 2 LOZENGE ORAL at 08:42

## 2025-01-17 RX ADMIN — RISPERIDONE 2 MG: 1 TABLET, ORALLY DISINTEGRATING ORAL at 22:31

## 2025-01-17 RX ADMIN — NICOTINE POLACRILEX 2 MG: 2 LOZENGE ORAL at 16:57

## 2025-01-17 RX ADMIN — Medication 3 MG: at 22:31

## 2025-01-17 RX ADMIN — HALOPERIDOL 5 MG: 5 TABLET ORAL at 09:29

## 2025-01-17 RX ADMIN — HYDROXYZINE HYDROCHLORIDE 50 MG: 50 TABLET ORAL at 09:29

## 2025-01-17 RX ADMIN — NICOTINE POLACRILEX 2 MG: 2 LOZENGE ORAL at 07:47

## 2025-01-17 RX ADMIN — NICOTINE POLACRILEX 2 MG: 2 LOZENGE ORAL at 12:08

## 2025-01-17 RX ADMIN — NICOTINE POLACRILEX 2 MG: 2 LOZENGE ORAL at 09:42

## 2025-01-17 RX ADMIN — NICOTINE POLACRILEX 2 MG: 2 LOZENGE ORAL at 10:45

## 2025-01-17 ASSESSMENT — PAIN SCALES - GENERAL: PAINLEVEL_OUTOF10: 0

## 2025-01-17 NOTE — GROUP NOTE
Group Therapy Note    Date: 1/17/2025    Group Start Time: 0910  Group End Time: 0920  Group Topic: Orientation Group    [facility] Nacho Fortune        Group Therapy Note    Attendees: 2/3       Patient's Goal:  Identify interests for group/1:1 activities for the day; [place] to unit; Increase rapport with staff    Notes:  Patinet engaged 1:1 with this writer. Oriented to place and date. Patient is hesitant and delayed in responses. Patient endorses interests in art activities for group today. Denies any questions or needs at this time. Goals is to talk to Dr about discharge planning.     Status After Intervention:  Improved    Participation Level: Interactive    Participation Quality: Appropriate      Speech:  hesitant      Thought Process/Content: Logical      Affective Functioning: Congruent      Mood: dysphoric      Level of consciousness:  Alert and Attentive      Response to Learning: Able to verbalize current knowledge/experience and Progressing to goal      Endings: None Reported    Modes of Intervention: Support, Socialization, Exploration, Activity, Media, and Reality-testing      Discipline Responsible: Psychoeducational Specialist      Signature:  Nacho Bianchi

## 2025-01-17 NOTE — BH NOTE
[facility]  Day 3 Interdisciplinary Treatment Plan NOTE    Review Date & Time: 0847 1/17/24    Admission Type:   Admission Type: Involuntary    Reason for admission:  Reason for Admission: Patient was acting irratic, delusional, not taking care of self. States that she is seeing \"bad spirits\" and that she is a witch. History of drug use.  Estimated Length of Stay:  5-7 days  Estimated Discharge Date Update:  to be determined by physician    PATIENT STRENGTHS:  Patient Strengths:   Patient Strengths and Limitations:Limitations: Multiple barriers to leisure interests, Difficulty problem solving/relies on others to help solve problems, Lacks leisure interests, Apathetic / unmotivated  Addictive Behavior:Addictive Behavior  In the Past 3 Months, Have You Felt or Has Someone Told You That You Have a Problem With  : None  Medical Problems:  Past Medical History:   Diagnosis Date    ADHD (attention deficit hyperactivity disorder)     Anxiety     Depression     Schizo-affective schizophrenia (HCC)        Risk:  Fall Risk   Alex Scale Alex Scale Score: 23  BVC    Change in scores:  No Changes to plan of Care:  No    Status EXAM:   Mental Status and Behavioral Exam  Normal: No  Level of Assistance: Independent/Self  Facial Expression: Flat  Affect: Unstable, Inappropriate  Level of Consciousness: Alert  Frequency of Checks: 4 times per hour, close  Mood:Normal: No  Mood: Depressed, Anxious, Labile, Suspicious, Helpless  Motor Activity:Normal: Yes  Motor Activity: Increased  Eye Contact: Fair  Observed Behavior: Guarded, Preoccupied, Cooperative  Sexual Misconduct History: Current - no  Preception: [place] to person, [place] to time, [place] to place, [place] to situation  Attention:Normal: No  Attention: Unable to concentrate  Thought Processes: Blocking  Thought Content:Normal: No  Thought Content: Paranoia, Preoccupations  Depression Symptoms: Impaired concentration, Isolative  Anxiety Symptoms:

## 2025-01-17 NOTE — BH NOTE
Patient continues to cry in day room, no tears in eyes, crying uncontrollably. Staff intervened, patient ignores staff, will not state reason for crying, offered patient therapeutic activities/interventions to relieve anxiety. Patient refuses shower and coloring. Recreational Therapist offered suggestions to relieve anxiety. Patient is currently listening to music while pacing and crying. Will continue to monitor and ensure safety.

## 2025-01-17 NOTE — PLAN OF CARE
Problem: Safety - Adult  Goal: Free from fall injury  Outcome: Progressing     Problem: Psychosis  Goal: Will report no hallucinations or delusions  Description: INTERVENTIONS:  1. Administer medication as  ordered  2. Assist with reality testing to support increasing orientation  3. Assess if patient's hallucinations or delusions are encouraging self harm or harm to others and intervene as appropriate  Outcome: Progressing     Problem: Linda  Goal: Will exhibit normal sleep and speech and no impulsivity  Description: INTERVENTIONS:  1. Administer medication as ordered  2. Set limits on impulsive behavior  3. Make attempts to decrease external stimuli as possible  Outcome: Progressing   Patient said mood was \"good\". Patient denies having thoughts of harming self or others. Patient denies auditory and visual hallucinations. Patient said eating and sleeping \"good\". Patient denies anxiety and depression. Patient isolative, flat and guarded. Patient remained in room most of shift and slept all night only came out for night time meds. Patient compliant with assessment and medication administration. Patient free of fall and injury this shift. Will continue to monitor every 15 minutes with environmental safety checks.

## 2025-01-17 NOTE — GROUP NOTE
Group Therapy Note    Date: 1/17/2025    Group Start Time: 1330  Group End Time: 1400  Group Topic: Group Documentation    [facility] Ronny Horn LPN    Health/Wellness Group Note        Date: January 17, 2025 Start Time: 1:30pm  End Time:  1400      Number of Participants in Group & Unit Census:  0/3    Topic: Coping Skills    Goal of Group:Focused on identifying healthy and positive coping skills during everyday life stressers      Comments:     Patient did not participate in Health/Wellness group, despite staff encouragement and explanation of benefits.  Patient remain seclusive to self.  Q15 minute safety checks maintained for patient safety and will continue to encourage patient to attend unit programming.

## 2025-01-17 NOTE — GROUP NOTE
Group Therapy Note    Date: 1/17/2025    Group Start Time: 0800  Group End Time: 0830  Group Topic: Group Documentation    [facility]    Ronny Ford LPN    Community Meeting Group Note        Date: January 17, 2025 Start Time:  0800   End Time:  0830      Number of Participants in Group & Unit Census:  0/3    Topic: Daily Goals Group    Goal of Group:Short term goals      Comments:     Patient did not participate in Community Meeting group, despite staff encouragement and explanation of benefits.  Patient remain seclusive to self.  Q15 minute safety checks maintained for patient safety and will continue to encourage patient to attend unit programming.

## 2025-01-17 NOTE — PLAN OF CARE
Problem: Psychosis  Goal: Will report no hallucinations or delusions  Description: INTERVENTIONS:  1. Administer medication as  ordered  2. Assist with reality testing to support increasing orientation  3. Assess if patient's hallucinations or delusions are encouraging self harm or harm to others and intervene as appropriate  1/17/2025 0832 by Ronny Ford LPN  Outcome: Progressing  Patient denies all but is observed crying uncontrollably then laughing. Patient is allof of peers. Patient denies any thoughts to harm self or others.     Problem: Anxiety  Goal: Will report anxiety at manageable levels  Description: INTERVENTIONS:  1. Administer medication as ordered  2. Teach and rehearse alternative coping skills  3. Provide emotional support with 1:1 interaction with staff  Outcome: Progressing   Every 15 minute checks maintained for patient safety.

## 2025-01-17 NOTE — BH NOTE
Emergency PRN Medication Administration Note:      Patient is Agitated as evidence by increased gross motor activity, restlessness, and patient requesting medication. Staff attempted to find and relieve the distress by Talking to patient, Refocusing on new activity, Offering suggestions, Checking for undiagnosed pain, and Administer PRN medications Patient is currently requesting emergency PRN. Medication Administered as prescribed: Haldol 5mg PO, Atarax 50mg PO. Patient Tolerated medication administration.   Will continue to monitor, offer support, and reassess.

## 2025-01-17 NOTE — H&P
[facility] Internal Medicine  Moisés Zuluaga MD; Chun Robbins MD, Evangelist Saldana MD, Jagruti Leahy MD, Bridger Warner MD; Chaparro Banks MD    [facility] Internal Medicine   IN-PATIENT SERVICE   [facility]     HISTORY AND PHYSICAL EXAMINATION            Date:   1/17/2025  Patient name:  Lisy Colin  Date of admission:  1/14/2025  7:00 AM  MRN:   [id number]  Account:  [id number]  YOB: 2000  PCP:    No primary care provider on file.  Room:   [address] [place] [address]  Code Status:    Full Code      Chief Complaint:     Suicidal /Ac Psychosis    History Obtained From:     Patient/EMR/bedside RN     History of Present Illness:     24-year-old female with past medical history as mentioned below admitted for acute psychosis.    Past Medical History:     Past Medical History:   Diagnosis Date    ADHD (attention deficit hyperactivity disorder)     Anxiety     Depression     Schizo-affective schizophrenia (HCC)         Past Surgical History:     Past Surgical History:   Procedure Laterality Date    ADENOIDECTOMY      FOOT FRACTURE SURGERY Bilateral 12/16/2021    LEFT CALCANEOUS OPEN REDUCTION INTERNAL FIXATION AND RIGHT FOOT FRACTURES OPEN REDUCTION INTERNAL FIXATION performed by Abilio Watts MD at [facility] OR    FOOT SURGERY Left 5/31/2023    Left foot removal of hardware performed by Abilio Watts MD at [facility] OR    TONSILLECTOMY  2003        Medications Prior to Admission:     Prior to Admission medications    Medication Sig Start Date End Date Taking? Authorizing Provider   QUEtiapine (SEROQUEL) 100 MG tablet Take 1 tablet by mouth 2 times daily  Patient taking differently: Take 3 tablets by mouth at bedtime 12/23/24   Sebastián Siu MD   melatonin 3 MG TABS tablet Take 1 tablet by mouth nightly 10/3/24   Aaron Glass MD        Allergies:     Patient has no known allergies.    Social History:     Tobacco:    reports that she has been 
History:   History reviewed. No pertinent family history.    Psychiatric Family History  Patient endorses psychiatric family history.     Suicides in family: [] Yes [x] No    Substance use in family: [x] Yes [] No         PHYSICAL EXAM:  Vitals:  /86   Pulse (!) 112   Temp 98.2 °F (36.8 °C) (Oral)   Resp 15   Ht 1.6 m (5' 2.99\")   Wt 63.5 kg (140 lb)   SpO2 100%   BMI 24.81 kg/m²   Pain Level: 0    LABS:  Labs reviewed: [x] Yes  Metabolic Screening:  [x] Yes [] No (order HgBA1C/Lipid panel if not screened in past year)  Last EKG in EMR reviewed: [x] Yes   QTC: 457       Review of Systems   Constitutional: Negative for chills and weight loss.   HENT: Negative for ear pain and nosebleeds.    Eyes: Negative for blurred vision and photophobia.   Respiratory: Negative for cough, shortness of breath and wheezing.    Cardiovascular: Negative for chest pain and palpitations.   Gastrointestinal: Negative for abdominal pain, diarrhea and vomiting.   Genitourinary: Negative for dysuria and urgency.   Musculoskeletal: Negative for falls and joint pain.   Skin: Negative for itching and rash.   Neurological: Negative for tremors, seizures and weakness.   Endo/Heme/Allergies: Does not bruise/bleed easily.      Mental Status Examination:    Level of consciousness: Awake and alert  Appearance:  Appropriate attire, seated on bed, poor grooming   Behavior/Motor: Guarded, irritable  Attitude toward examiner:  Semi-cooperative, seems internally preoccupied, intense eye contact  Speech: Latent, low volume, suspicious tone  Mood: Constricted  Affect: Irritable  Thought processes:  Mostly linear and coherent, thought blocking noted, illogical  Thought content: Denies suicidal ideations              Denies homicidal ideations               Denies hallucinations however appears to be responding to internal stimuli  Delusions: Exhibits paranoid and bizarre delusions  Cognition:  Oriented to self, location, time,

## 2025-01-17 NOTE — PROGRESS NOTES
Daily Progress Note  1/17/2025    Patient Name: Lisy Colin    CHIEF COMPLAINT:  Acute Psychosis          SUBJECTIVE:    Patient was seen for follow-up assessment today.  Nursing staff report she has been restless and paranoid today.  Mood is labile.  She was observed in the day area watching TV standing and seem to be responding to internal stimuli.  She was guarded on approach and suspicious of writer.  She declined to sit with writer in privacy for assessment.  She is reporting auditory and visual hallucinations intermittently.  She did not want to discuss nature of perceptual disturbances.  She is denying suicidal and homicidal ideation.  She is reporting significant feelings of restlessness and anxiety.  She is endorsing ongoing paranoia and feels that people are out to get her or talking about her.  She abruptly cut off conversation and did not want to continue assessment.  She walked away from writer.  At this time patient has yet to demonstrate stability and warrants further hospitalization for safety and stabilization.    Appetite:  [x] Normal/Adequate/Unchanged  [] Increased  [] Decreased      Sleep:       [] Normal/Adequate/Unchanged  [] Fair  [x] Poor      Group Attendance on Unit:   [] Yes  [] Selectively    [x] No    Medication Side Effects:  Patient denies any medication side effects at the time of assessment.         Mental Status Exam  Level of consciousness: Awake and alert  Appearance: Appropriate attire for setting, standing, with poor grooming and hygiene.   Behavior/Motor: Restless  Attitude toward examiner: Guarded, minimally cooperative, intense eye contact, seems internally stimulated  Speech: Hesitant, normal rate and volume, minimal output  Mood: Anxious  Affect: Constricted  Thought processes: Thought blocking but linear  Thought content: Denies homicidal ideation.  Suicidal Ideation: Denies suicidal ideations  Delusions: Endorses paranoia  Perceptual Disturbance: Patient does appear

## 2025-01-17 NOTE — GROUP NOTE
Group Therapy Note    Date: 1/17/2025    Group Start Time: 1100  Group End Time: 1145  Group Topic: Recreational    DELLA BARNETT Nacho Fortune    Recreation Group Note        Date: 1/17/2025   Start Time: 1100  End Time: 1145      Number of Participants in Group & Unit Census:  1/2    Topic: Open recreation; Offered patients a variety of group, individual, and 1:1 activities including art, music, games, and talk time. This writer made self available to patient in the day room with a variety of activity available    Goal of Group: Goals to increase self-expression; increase sense of community; Increase socialization; Demonstrate positive use of time; Increase rapport with staff       Comments:     Patient did not participate in Recreation group, despite staff encouragement and explanation of benefits.  Krystian initially requested art group in morning orientation but when offered at this time declined to participate. Patient remain seclusive to self.  Q15 minute safety checks maintained for patient safety and will continue to encourage patient to attend unit programming.

## 2025-01-17 NOTE — BH NOTE
Emergency Medication Follow-Up Note:    PRN medication of Haldol 5 mg PO was effective as evidence by patient regaining behavioral control, using coping skills to manage anxiety, took shower, and is currently in day area listening to music.  Patient denies medication side effects. Will continue to monitor and provide support as needed.

## 2025-01-17 NOTE — BH NOTE
Emergency PRN Medication Administration Note:      Patient is Agitated and Disruptive as evidence by tearful, sobbing, crying uncontrollably, and increased pacing. Staff attempted to find and relieve the distress by Talking to patient, Refocusing on new activity, Offering suggestions, Checking for undiagnosed pain, and Administer PRN medications Patient is currently continuing to escalate. Medication Administered as prescribed: Haldol 5mg PO. Patient Tolerated medication administration.   Will continue to monitor, offer support, and reassess.

## 2025-01-17 NOTE — GROUP NOTE
Group Therapy Note    Date: 1/16/2025    Group Start Time: 2030  Group End Time: 2045  Group Topic: Wrap-Up    Celeste Rai        Group Therapy Note    Attendees: 2       Patient's Goal:  refused to answer    Notes:  Patient is cooperative but poverty of content when discussing goals.    Status After Intervention:  Unchanged    Participation Level: Minimal    Participation Quality: Inappropriate      Speech:  hesitant      Thought Process/Content: Delusional      Affective Functioning: Blunted and Flat      Mood: anxious      Level of consciousness:  Alert      Response to Learning: Resistant      Endings: None Reported    Modes of Intervention: Education, Support, Socialization, Problem-solving, and Limit-setting      Discipline Responsible: Registered Nurse      Signature:  Celeste Beaver

## 2025-01-18 LAB
EKG ATRIAL RATE: 115 BPM
EKG P AXIS: 51 DEGREES
EKG P-R INTERVAL: 130 MS
EKG Q-T INTERVAL: 326 MS
EKG QRS DURATION: 68 MS
EKG QTC CALCULATION (BAZETT): 450 MS
EKG R AXIS: 38 DEGREES
EKG T AXIS: 19 DEGREES
EKG VENTRICULAR RATE: 115 BPM

## 2025-01-18 PROCEDURE — 6370000000 HC RX 637 (ALT 250 FOR IP): Performed by: NURSE PRACTITIONER

## 2025-01-18 PROCEDURE — 6370000000 HC RX 637 (ALT 250 FOR IP): Performed by: PSYCHIATRY & NEUROLOGY

## 2025-01-18 PROCEDURE — 6370000000 HC RX 637 (ALT 250 FOR IP)

## 2025-01-18 PROCEDURE — 2040000000 HC PSYCH ICU R&B

## 2025-01-18 PROCEDURE — 93010 ELECTROCARDIOGRAM REPORT: CPT | Performed by: INTERNAL MEDICINE

## 2025-01-18 RX ADMIN — NICOTINE POLACRILEX 2 MG: 2 LOZENGE ORAL at 12:51

## 2025-01-18 RX ADMIN — NICOTINE POLACRILEX 2 MG: 2 LOZENGE ORAL at 17:37

## 2025-01-18 RX ADMIN — RISPERIDONE 2 MG: 1 TABLET, ORALLY DISINTEGRATING ORAL at 08:18

## 2025-01-18 RX ADMIN — QUETIAPINE FUMARATE 200 MG: 200 TABLET ORAL at 21:10

## 2025-01-18 RX ADMIN — NICOTINE POLACRILEX 2 MG: 2 LOZENGE ORAL at 11:02

## 2025-01-18 RX ADMIN — NICOTINE POLACRILEX 2 MG: 2 LOZENGE ORAL at 07:36

## 2025-01-18 RX ADMIN — Medication 3 MG: at 21:10

## 2025-01-18 RX ADMIN — NICOTINE POLACRILEX 2 MG: 2 LOZENGE ORAL at 15:11

## 2025-01-18 RX ADMIN — RISPERIDONE 2 MG: 1 TABLET, ORALLY DISINTEGRATING ORAL at 21:10

## 2025-01-18 NOTE — PROGRESS NOTES
Daily Progress Note  James Pena MD  1/18/2025  CHIEF COMPLAINT: Psychosis    Reviewed patient's current plan of care and vital signs with nursing staff.  Sleep:  several hours last night  Attending groups: No:     SUBJECTIVE:    Patient received emergency medications overnight for agitation and restlessness.  Patient did reports she tolerated Seroquel in combination with Risperdal and reports a good night sleep.  He is denying any side effects to medication at current time.  Discussed monitoring medication for couple days to see if it is helpful and patient is in agreement.  She seems was guarding today but unclear whether it is from emergency medications in addition to scheduled or whether patient will be able to demonstrate stability absent that.  She still demonstrates some thought blocking but less so than previous days.  Reports fair appetite.    Mental Status Exam  Level of consciousness:  Within normal limits  Appearance: Hospital attire, seated in chair, with poor grooming and hygiene   Behavior/Motor: Less psychomotor agitation than previous days but still present  Attitude toward examiner:  Cooperative, attentive, good eye contact  Speech: Patient is demonstrating less obstructed and broken speech than days past  Mood: \"Okay\"  Affect: Guarded, suspicious and withdrawn  Thought processes: Disconnected at the time  Thought content:  denies homicidal ideation  Suicidal Ideation: Denies suicidal ideation  Delusions:  no evidence of delusions elicited today  Perceptual Disturbance: Appears to respond to internal stimuli  Cognition:  Oriented to self, location, time, and situation  Memory: age appropriate  Insight & Judgement: improving  Medication side effects:  denies       Data   height is 1.6 m (5' 2.99\") and weight is 63.5 kg (140 lb). Her oral temperature is 98 °F (36.7 °C). Her blood pressure is 129/79 and her pulse is 102 (abnormal). Her respiration is 14 and oxygen saturation is 99%.   Labs:

## 2025-01-18 NOTE — PLAN OF CARE
Problem: Pain  Goal: Verbalizes/displays adequate comfort level or baseline comfort level  Outcome: Progressing   Patient denies being in any pain, no signs of discomfort noticed.  Problem: Safety - Adult  Goal: Free from fall injury  Outcome: Progressing   Patient free from falls and injury this shift.  Problem: Anxiety  Goal: Will report anxiety at manageable levels  Description: INTERVENTIONS:  1. Administer medication as ordered  2. Teach and rehearse alternative coping skills  3. Provide emotional support with 1:1 interaction with staff  Outcome: Progressing     Problem: Psychosis  Goal: Will report no hallucinations or delusions  Description: INTERVENTIONS:  1. Administer medication as  ordered  2. Assist with reality testing to support increasing orientation  3. Assess if patient's hallucinations or delusions are encouraging self harm or harm to others and intervene as appropriate  Outcome: Progressing   Patient denies feeling anxious or being depressed. Writer attempted to ask patient more questions and patient said \"I'm good, everything is good\". Patient flat, isolative to room, guarded and withdrawn. Patient remained in room all of the shift only out for needs and sleep through the night. Patient cooperative with medication administration. Will continue to monitor every 15 minutes with environmental safety checks.

## 2025-01-18 NOTE — PLAN OF CARE
Problem: Linda  Goal: Will exhibit normal sleep and speech and no impulsivity  Description: INTERVENTIONS:  1. Administer medication as ordered  2. Set limits on impulsive behavior  3. Make attempts to decrease external stimuli as possible  Outcome: Progressing  Note: Patient endorses general anxiety, reports feeling an improvement in mood and sleeping, looking forward to discharge.  Patient has remained in behavorial control while on unit and has not required emergency medications in the last 24 hours. Patient denies any suicidal/homicidal ideation at this time. Will continue to monitor, assess, and provide a safe environment through Q15 minute safety checks.      Problem: Psychosis  Goal: Will report no hallucinations or delusions  Description: INTERVENTIONS:  1. Administer medication as  ordered  2. Assist with reality testing to support increasing orientation  3. Assess if patient's hallucinations or delusions are encouraging self harm or harm to others and intervene as appropriate  1/18/2025 1740 by Tyesha Camarillo LPN  Outcome: Progressing  1/18/2025 0440 by Chuck Velazquez LPN  Outcome: Progressing     Problem: Anxiety  Goal: Will report anxiety at manageable levels  Description: INTERVENTIONS:  1. Administer medication as ordered  2. Teach and rehearse alternative coping skills  3. Provide emotional support with 1:1 interaction with staff  1/18/2025 1740 by Tyesha Camarillo LPN  Outcome: Progressing  1/18/2025 0440 by Chuck Velazquez LPN  Outcome: Progressing

## 2025-01-19 VITALS
TEMPERATURE: 97.2 F | HEART RATE: 127 BPM | OXYGEN SATURATION: 99 % | RESPIRATION RATE: 14 BRPM | BODY MASS INDEX: 24.8 KG/M2 | DIASTOLIC BLOOD PRESSURE: 102 MMHG | WEIGHT: 140 LBS | SYSTOLIC BLOOD PRESSURE: 140 MMHG | HEIGHT: 63 IN

## 2025-01-19 PROCEDURE — 6370000000 HC RX 637 (ALT 250 FOR IP): Performed by: NURSE PRACTITIONER

## 2025-01-19 PROCEDURE — 2040000000 HC PSYCH ICU R&B

## 2025-01-19 PROCEDURE — 6370000000 HC RX 637 (ALT 250 FOR IP): Performed by: PSYCHIATRY & NEUROLOGY

## 2025-01-19 PROCEDURE — APPSS30 APP SPLIT SHARED TIME 16-30 MINUTES

## 2025-01-19 PROCEDURE — 6360000002 HC RX W HCPCS: Performed by: PSYCHIATRY & NEUROLOGY

## 2025-01-19 PROCEDURE — 6370000000 HC RX 637 (ALT 250 FOR IP)

## 2025-01-19 PROCEDURE — 99231 SBSQ HOSP IP/OBS SF/LOW 25: CPT | Performed by: INTERNAL MEDICINE

## 2025-01-19 RX ADMIN — RISPERIDONE 200 MG: 200 INJECTION, SUSPENSION, EXTENDED RELEASE SUBCUTANEOUS at 17:52

## 2025-01-19 RX ADMIN — NICOTINE POLACRILEX 2 MG: 2 LOZENGE ORAL at 15:00

## 2025-01-19 RX ADMIN — Medication 3 MG: at 20:53

## 2025-01-19 RX ADMIN — NICOTINE POLACRILEX 2 MG: 2 LOZENGE ORAL at 21:17

## 2025-01-19 RX ADMIN — NICOTINE POLACRILEX 2 MG: 2 LOZENGE ORAL at 18:41

## 2025-01-19 RX ADMIN — QUETIAPINE FUMARATE 200 MG: 200 TABLET ORAL at 20:53

## 2025-01-19 RX ADMIN — NICOTINE POLACRILEX 2 MG: 2 LOZENGE ORAL at 09:30

## 2025-01-19 RX ADMIN — RISPERIDONE 2 MG: 1 TABLET, ORALLY DISINTEGRATING ORAL at 20:53

## 2025-01-19 RX ADMIN — NICOTINE POLACRILEX 2 MG: 2 LOZENGE ORAL at 07:30

## 2025-01-19 RX ADMIN — RISPERIDONE 2 MG: 1 TABLET, ORALLY DISINTEGRATING ORAL at 08:29

## 2025-01-19 NOTE — GROUP NOTE
Group Therapy Note    Date: 1/19/2025    Group Start Time: 1107  Group End Time: 1225  Group Topic: Cognitive Skills    [facility]    Oralia Lee CTRS        Group Therapy Note    Attendees: 3/6     Topic: To increase socialization, practice creative expression, and explore positive coping skills   through discussion.          Comments:   Patient did not participate in Cognitive Skills Group, at 1110, despite staff encouragement.   Pt was resting in room and seclusive to self when invited to group.      Q15 minute safety checks maintained for patient safety and will continue to encourage   patient to attend unit programming.       Discipline Responsible: Psychoeducational Specialist   Signature: ROLA MICHAUD

## 2025-01-19 NOTE — PLAN OF CARE
Problem: Linda  Goal: Will exhibit normal sleep and speech and no impulsivity  Description: INTERVENTIONS:  1. Administer medication as ordered  2. Set limits on impulsive behavior  3. Make attempts to decrease external stimuli as possible  1/19/2025 0917 by Lauryn Rucker LPN  Outcome: Progressing  Note: Patient presents with normal response, denies anxiety. Restless and pacing throughout shift. Patient agrees to take medications as scheduled     Problem: Psychosis  Goal: Will report no hallucinations or delusions  Description: INTERVENTIONS:  1. Administer medication as  ordered  2. Assist with reality testing to support increasing orientation  3. Assess if patient's hallucinations or delusions are encouraging self harm or harm to others and intervene as appropriate  Outcome: Progressing  Note: Patient denies thoughts of self harm, remains flat in affect cooperative with staff. 15 min safety checks continue.      Problem: Anxiety  Goal: Will report anxiety at manageable levels  Description: INTERVENTIONS:  1. Administer medication as ordered  2. Teach and rehearse alternative coping skills  3. Provide emotional support with 1:1 interaction with staff  Outcome: Progressing  Flowsheets (Taken 1/19/2025 0917)  Will report anxiety at manageable levels:   Teach and rehearse alternative coping skills   Provide emotional support with 1:1 interaction with staff  Note: Patient denies anxiety, restless and pacing noted      Problem: Safety - Adult  Goal: Free from fall injury  Outcome: Progressing     Problem: Pain  Goal: Verbalizes/displays adequate comfort level or baseline comfort level  1/19/2025 0917 by Lauryn Rucker LPN  Outcome: Progressing  Flowsheets (Taken 1/19/2025 0917)  Verbalizes/displays adequate comfort level or baseline comfort level:   Encourage patient to monitor pain and request assistance   Assess pain using appropriate pain scale  Note: Patient denies pain this shift

## 2025-01-19 NOTE — GROUP NOTE
Group Therapy Note    Date: 1/19/2025    Group Start Time: 1330  Group End Time: 1345  Group Topic: Healthy Living/Wellness    Shey Barone    Safety Group Therapy Note    Attendees: 6/6      Goal of Group:   Increased understanding/knowledge of personal safety and                                safety on the unit.        Note:   Patient participated in Safety group/room checks. No contraband found.                 Q15 minute safety checks maintained for patient safety.                 Will continue to encourage safety on the unit.         Status After Intervention: Improved       Participation Level: Active Listener      Participation Quality: Appropriate      Speech: normal       Thought Process/Content: Logical, Linear       Affective Functioning: Congruent       Mood: anxious       Level of consciousness: Oriented x4       Response to Learning:   Able to verbalize current knowledge/experience, Able to                                              verbalize/acknowledge new learning, Able to retain                                              information, and Capable of insight        Endings: None Reported       Modes of Intervention: Education, Support        Discipline Responsible: Behavorial Health Tech       Signature: Daniella Kumar

## 2025-01-19 NOTE — GROUP NOTE
Group Therapy Note    Date: 1/18/2025    Group Start Time: 1300  Group End Time: 1335  Group Topic: Healthy Living/Wellness    Shey Barone    Wellness Group Therapy Note    Attendees: 3/5     Patient's Goal: Increased understanding of Wellness.       Notes: Patient participated in discussion regarding Emotional Wellness.            Status After Intervention: Improved       Participation Level: Active Listener and Interactive       Participation Quality: Appropriate, Attentive, Sharing, and Supportive       Speech: normal       Thought Process/Content: Logical, Linear       Affective Functioning: Congruent       Mood: anxious       Level of consciousness: Oriented x4       Response to Learning: Able to verbalize current knowledge/experience, Able to                                         verbalize/acknowledge new learning, Able to retain                                         information, and Capable of insight        Endings: None Reported       Modes of Intervention: Education, Support, Socialization, and Problem-solving          Discipline Responsible: Behavorial Health Tech       Signature: Shey Ford

## 2025-01-19 NOTE — PROGRESS NOTES
Daily Progress Note  1/19/2025    Patient Name: Lisy Colin    CHIEF COMPLAINT: Acute psychosis         SUBJECTIVE:    Patient is seen today for a follow up assessment.  She is found in the milieu.  She is approachable but appears guarded. She agrees to conduct interview at dayroom table.  Denies need for privacy.  Her affect and speech are blunted.  She is monosyllabic. Describes mood as \"good\". She denies depression or anxiety.  Denies suicidal homicidal ideation.  Denies hallucinations or paranoia.  Denies concerns for safety or privacy on the unit.  She is compliant with taking scheduled psychiatric medications.  No adverse effects reported.  Expressed that she has found her medications to be helpful.  She is scheduled to receive risperidone  mg injection today.  She has been without behavioral disturbances or use of emergency medications in the past 24 hours.     Appetite:  [x] Adequate/Unchanged  [] Increased  [] Decreased      Sleep:       [x] Adequate/Unchanged  [] Fair  [] Poor      Group Attendance on Unit:   [x] Yes   [] Selectively    [] No    Compliant with scheduled medications: [x] Yes  [] No    Received emergency medications in past 24 hrs: [] Yes   [x] No    Medication Side Effects: Denies         Mental Status Exam  Level of consciousness: Alert and awake   Appearance: Appropriate attire for setting, seated in chair, with poor grooming and hygiene   Behavior/Motor: Approachable, engages with interviewer, no psychomotor abnormalities   Attitude toward examiner: Cooperative, attentive, good eye contact  Speech: Blunted, monosyllabic  Mood: \"Good\"  Affect: Blunted  Thought processes: linear and coherent   Thought content:  Denies homicidal ideation  Suicidal Ideation: Denies suicidal ideations, contracts for safety on the unit.   Delusions: No evidence of delusions.   Perceptual Disturbance: Denies any perceptual disturbances.  Patient does not to be responding to internal stimuli.

## 2025-01-19 NOTE — PLAN OF CARE
Problem: Linda  Goal: Will exhibit normal sleep and speech and no impulsivity  Description: INTERVENTIONS:  1. Administer medication as ordered  2. Set limits on impulsive behavior  3. Make attempts to decrease external stimuli as possible  1/18/2025 2049 by Manish Angulo RN  Outcome: Not Progressing  Note: Patient sleeping in room since start of shift. During 1 on 1 patient answered in 1 word answers and would not elaborate further.      Problem: Pain  Goal: Verbalizes/displays adequate comfort level or baseline comfort level  Outcome: Progressing  Note: Patient able to verbalize pain needs and denies pain at this time.

## 2025-01-19 NOTE — GROUP NOTE
Group Therapy Note    Date: 1/19/2025    Group Start Time: 0900  Group End Time: 0920  Group Topic: Community Meeting    Shey Barone    Morning Goals Group Note    Attendees: 3/6       Patient's Goal: Patient will verbalize today's goals. Patient will also offer                             supportive listening and interact with peers to clarify and                             understand clearly set goals.           Notes: Patient is making progress AEB participating in group discussion, actively                listening, and supporting other group members.                 Status After Intervention: Improved         Participation Level: Active Listener and Interactive         Participation Quality: Appropriate, Attentive, Sharing, and Supportive         Speech: normal         Thought Process/Content: Logical, Linear         Affective Functioning: Congruent         Mood: anxious         Level of consciousness: Oriented x4         Response to Learning: Able to verbalize current knowledge/experience, Able to                                          verbalize/acknowledge new learning, Able to retain                                          information, and Capable of insight          Endings: None Reported         Modes of Intervention: Education, Support, Socialization, and Problem-solving             Discipline Responsible: Behavorial Health Tech         Signature: Shey Kumar

## 2025-01-19 NOTE — GROUP NOTE
Group Therapy Note    Date: 1/19/2025    Group Start Time: 1000  Group End Time: 1040  Group Topic: Healthy Living/Wellness    Shey Barone    Wellness Group Therapy Note    Attendees: 4/6      Patient's Goal: Increased understanding of Wellness.       Notes: Patient participated in discussion regarding Emotional Wellness.            Status After Intervention: Improved       Participation Level: Active Listener and Interactive       Participation Quality: Appropriate, Attentive, Sharing, and Supportive       Speech: normal       Thought Process/Content: Logical, Linear       Affective Functioning: Congruent       Mood: anxious       Level of consciousness: Oriented x4       Response to Learning: Able to verbalize current knowledge/experience, Able to                                         verbalize/acknowledge new learning, Able to retain                                         information, and Capable of insight        Endings: None Reported       Modes of Intervention: Education, Support, Socialization, and Problem-solving          Discipline Responsible: Behavorial Health Tech       Signature: Shey Ford

## 2025-01-19 NOTE — GROUP NOTE
Group Therapy Note    Date: 1/18/2025    Group Start Time: 0900  Group End Time: 0915  Group Topic: Community Meeting    Shey Barone    Morning Goals Group Note    Attendees: 3/5       Patient's Goal: Patient will verbalize today's goals. Patient will also offer                             supportive listening and interact with peers to clarify and                             understand clearly set goals.           Notes: Patient is making progress AEB participating in group discussion, actively                listening, and supporting other group members.                 Status After Intervention: Improved         Participation Level: Active Listener and Interactive         Participation Quality: Appropriate, Attentive, Sharing, and Supportive         Speech: normal         Thought Process/Content: Logical, Linear         Affective Functioning: Congruent         Mood: anxious         Level of consciousness: Oriented x4         Response to Learning: Able to verbalize current knowledge/experience, Able to                                          verbalize/acknowledge new learning, Able to retain                                          information, and Capable of insight          Endings: None Reported         Modes of Intervention: Education, Support, Socialization, and Problem-solving             Discipline Responsible: Behavorial Health Tech         Signature: Shey Kumar

## 2025-01-19 NOTE — PROGRESS NOTES
[facility] Internal Medicine  Moisés Zuluaga MD; Chun Robbins MD, Evangelist Saldana MD, Jagruti Leahy MD, Bridger Warner MD; Chaparro Banks MD    [facility] Internal Medicine   IN-PATIENT SERVICE   [facility]     HISTORY AND PHYSICAL EXAMINATION            Date:   1/19/2025  Patient name:  Lisy Colin  Date of admission:  1/14/2025  7:00 AM  MRN:   [id number]  Account:  [id number]  YOB: 2000  PCP:    No primary care provider on file.  Room:   [address] [place] [address]  Code Status:    Full Code      Chief Complaint:     Suicidal /Ac Psychosis    History Obtained From:     Patient/EMR/bedside RN     History of Present Illness:     24-year-old female with past medical history as mentioned below admitted for acute psychosis.    Past Medical History:     Past Medical History:   Diagnosis Date    ADHD (attention deficit hyperactivity disorder)     Anxiety     Depression     Schizo-affective schizophrenia (HCC)         Past Surgical History:     Past Surgical History:   Procedure Laterality Date    ADENOIDECTOMY      FOOT FRACTURE SURGERY Bilateral 12/16/2021    LEFT CALCANEOUS OPEN REDUCTION INTERNAL FIXATION AND RIGHT FOOT FRACTURES OPEN REDUCTION INTERNAL FIXATION performed by Abilio Watts MD at [facility] OR    FOOT SURGERY Left 5/31/2023    Left foot removal of hardware performed by Abilio Watts MD at [facility] OR    TONSILLECTOMY  2003        Medications Prior to Admission:     Prior to Admission medications    Medication Sig Start Date End Date Taking? Authorizing Provider   QUEtiapine (SEROQUEL) 100 MG tablet Take 1 tablet by mouth 2 times daily  Patient taking differently: Take 3 tablets by mouth at bedtime 12/23/24   Sebastián Siu MD   melatonin 3 MG TABS tablet Take 1 tablet by mouth nightly 10/3/24   Aaron Glass MD        Allergies:     Patient has no known allergies.    Social History:     Tobacco:    reports that she has been

## 2025-01-20 PROCEDURE — 6370000000 HC RX 637 (ALT 250 FOR IP): Performed by: NURSE PRACTITIONER

## 2025-01-20 RX ORDER — QUETIAPINE FUMARATE 200 MG/1
200 TABLET, FILM COATED ORAL NIGHTLY
Qty: 30 TABLET | Refills: 0 | Status: SHIPPED | OUTPATIENT
Start: 2025-01-20

## 2025-01-20 RX ORDER — HYDROXYZINE HYDROCHLORIDE 50 MG/1
50 TABLET, FILM COATED ORAL 3 TIMES DAILY PRN
Qty: 30 TABLET | Refills: 0 | Status: SHIPPED | OUTPATIENT
Start: 2025-01-20 | End: 2025-01-30

## 2025-01-20 RX ORDER — POLYETHYLENE GLYCOL 3350 17 G
2 POWDER IN PACKET (EA) ORAL
Qty: 100 EACH | Refills: 0 | Status: SHIPPED | OUTPATIENT
Start: 2025-01-20

## 2025-01-20 RX ADMIN — NICOTINE POLACRILEX 2 MG: 2 LOZENGE ORAL at 08:38

## 2025-01-20 RX ADMIN — NICOTINE POLACRILEX 2 MG: 2 LOZENGE ORAL at 10:56

## 2025-01-20 NOTE — PLAN OF CARE
Problem: Psychosis  Goal: Will report no hallucinations or delusions  Description: INTERVENTIONS:  1. Administer medication as  ordered  2. Assist with reality testing to support increasing orientation  3. Assess if patient's hallucinations or delusions are encouraging self harm or harm to others and intervene as appropriate  1/19/2025 2047 by Manish Angulo RN  Outcome: Progressing  Note: Patient denies hallucinations and no responding observed.      Problem: Pain  Goal: Verbalizes/displays adequate comfort level or baseline comfort level  1/19/2025 2047 by Manish Angulo RN  Note: Patient denies pain at this time.

## 2025-01-20 NOTE — PROGRESS NOTES
CLINICAL PHARMACY NOTE: MEDS TO BEDS    Total # of Prescriptions Filled: 3   The following medications were delivered to the patient:  Nicotine Patches  Hydroxyzine HCL 50MG Tablets  Quetiapine Fumarate 200MG Tablets     Additional Documentation:  elivered to [facility] and signed for by Priscila at 11:20AM 1/20/25 - Melatonin too soon thru ins - placed on hold. Uzedy injection placed on hold.

## 2025-01-20 NOTE — CARE COORDINATION
Name: Lisy Colin    : 2000    Auth number: [id number]     Discharge Date: 2025    Destination: Private residence    Discharge Medications:      Medication List        START taking these medications      hydrOXYzine HCl 50 MG tablet  Commonly known as: ATARAX  Take 1 tablet by mouth 3 times daily as needed for Anxiety  Notes to patient: anxiety     nicotine polacrilex 2 MG lozenge  Commonly known as: COMMIT  Take 1 lozenge by mouth every hour as needed for Smoking cessation  Notes to patient: Smoking cessation     risperiDONE  MG/0.56ML Corrie subcutaneous injection  Commonly known as: UZEDY  Inject 0.56 mLs into the skin every 2 months  Start taking on: 2025  Notes to patient: Clear thoughts            CHANGE how you take these medications      QUEtiapine 200 MG tablet  Commonly known as: SEROQUEL  Take 1 tablet by mouth nightly  What changed:   medication strength  how much to take  when to take this  Notes to patient: Clear thoughts            CONTINUE taking these medications      melatonin 3 MG Tabs tablet  Take 1 tablet by mouth nightly  Notes to patient: insomnia               Where to Get Your Medications        These medications were sent to [id number] - [address] -  [phone] - F [phone]  [address] [place], [place] [id number]      Phone: [phone]   hydrOXYzine HCl 50 MG tablet  melatonin 3 MG Tabs tablet  nicotine polacrilex 2 MG lozenge  QUEtiapine 200 MG tablet  risperiDONE  MG/0.56ML Corrie subcutaneous injection         Follow Up Appointment: [facility]  [address] [place], [place] [address] [phone]    Follow up on 2025  You will be contacted on 25 with the details of your followup appointment.

## 2025-01-20 NOTE — BH NOTE
[facility]  Discharge Note    Pt discharged with followings belongings:   Dental Appliances: None  Vision - Corrective Lenses: None  Hearing Aid: None  Jewelry: None  Clothing: Pants, Shirt, Socks, Shorts, Jacket/Coat, Footwear   Valuables sent home with patient or returned to patient. Patient educated on aftercare instructions: Yes  Information faxed to Della by RN  at 1:14 PM .Patient verbalize understanding of AVS:  Yes.    Status EXAM upon discharge:  Mental Status and Behavioral Exam  Normal: Yes  Level of Assistance: Independent/Self  Facial Expression: Flat  Affect: Appropriate  Level of Consciousness: Alert  Frequency of Checks: 4 times per hour, close  Mood:Normal: No  Mood: Anxious  Motor Activity:Normal: Yes  Motor Activity: Increased  Eye Contact: Good  Observed Behavior: Withdrawn  Sexual Misconduct History: Current - no  Preception: [place] to person, [place] to time, [place] to place, [place] to situation  Attention:Normal: No  Attention: Unable to concentrate  Thought Processes: Unremarkable  Thought Content:Normal: No  Thought Content: Preoccupations  Depression Symptoms: Isolative  Anxiety Symptoms: Generalized  Linda Symptoms: No problems reported or observed.  Hallucinations: None (pt observed with self talk)  Delusions: Yes  Delusions: Paranoid  Memory:Normal: No  Memory: Poor remote  Insight and Judgment: No  Insight and Judgment: Poor judgment, Poor insight    Tobacco Screening:  Practical Counseling, on admission, jonatan X, if applicable and completed (first 3 are required if patient doesn't refuse):            ( ) Recognizing danger situations (included triggers and roadblocks)                    ( ) Coping skills (new ways to manage stress,relaxation techniques, changing routine, distraction)                                                           ( ) Basic information about quitting (benefits of quitting, techniques in how to quit, available resources  ( ) Referral for

## 2025-01-20 NOTE — GROUP NOTE
Group Therapy Note    Date: 1/20/2025    Group Start Time: 1105  Group End Time: 1200  Group Topic: Music Therapy    [facility]    Music Therapy Group Note        Date: 1/20/2025   Start Time: 1105  End Time: 1200      Number of Participants in Group & Unit Census:  2/6    Topic: Patients shared songs to dedicate to important people from their lives. Patients answered questions about these people and relationships as asked by this writer based on their music and sharing.     Goal of Group: Goals to reflect on relationships; Increase self-expression; Increase sense of community; Increase rapport with staff;       Comments:     Patient did not participate in Music Therapy group, despite staff encouragement and explanation of benefits.  Patient remain seclusive to self.  Q15 minute safety checks maintained for patient safety and will continue to encourage patient to attend unit programming.

## 2025-01-20 NOTE — BH NOTE
Patient given tobacco quitline number 37367057281 at this time, refusing to call at this time, states \" I just dont want to quit now\"- patient given information as to the dangers of long term tobacco use. Continue to reinforce the importance of tobacco cessation.
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
## 2025-01-20 NOTE — TRANSITION OF CARE
Immunization History   Administered Date(s) Administered    DTaP 2002, 2002, 2002, 07/24/2003, 04/11/2007    Hepatitis B (Recombivax HB) 2000, 2000, 2000    Hib PRP-OMP, PEDVAXHIB, (age 2m-6y, Adlt Risk), IM, 0.5mL 2002, 2002, 2002, 05/15/2003    MMR, PRIORIX, M-M-R II, (age 12m+), SC, 0.5mL 05/15/2003, 04/11/2007    Meningococcal ACWY, MENVEO (MenACWY-CRM), (age 2m-55y), IM, 0.5mL 02/28/2017    Pneumococcal, PCV-13, PREVNAR 13, (age 6w+), IM, 0.5mL 2002, 2002, 01/29/2003, 05/15/2003    Poliovirus, IPOL, (age 6w+), SC/IM, 0.5mL 2002, 2002, 2002, 04/11/2007    TDaP, ADACEL (age 10y-64y), BOOSTRIX (age 10y+), IM, 0.5mL 11/27/2013    Varicella, VARIVAX, (age 12m+), SC, 0.5mL 01/29/2003, 11/27/2013     Influenza Vaccination Status: None of the above/Not documented/Unable to determine from medical record documentation    Screening for Metabolic Disorders for Patients on Antipsychotic Medications  (Data obtained from the EMR)    Estimated Body Mass Index  Body mass index is 24.81 kg/m².      Vital Signs/Blood Pressure  BP (!) 140/102   Pulse (!) 127   Temp 97.2 °F (36.2 °C) (Oral)   Resp 14   Ht 1.6 m (5' 2.99\")   Wt 63.5 kg (140 lb)   SpO2 99%   BMI 24.81 kg/m²      Fasting Blood Glucose or Hemoglobin A1c  No results found for: \"GLU\", \"GLUCPOC\"    Hemoglobin A1C   Date Value Ref Range Status   08/06/2024 4.9 4.0 - 6.0 % Final       Discharge Diagnosis: Acute psychosis    Discharge Plan/Destination: Home    Discharge Medication List and Instructions:      Medication List        START taking these medications      hydrOXYzine HCl 50 MG tablet  Commonly known as: ATARAX  Take 1 tablet by mouth 3 times daily as needed for Anxiety  Notes to patient: anxiety     nicotine polacrilex 2 MG lozenge  Commonly known as: COMMIT  Take 1 lozenge by mouth every hour as needed for Smoking cessation  Notes to patient: Smoking cessation

## 2025-01-20 NOTE — GROUP NOTE
Group Therapy Note    Date: 1/20/2025    Group Start Time: 1000  Group End Time: 1030  Group Topic: Psychotherapy    [facility] Center[facility]ari[facility] Martinez MSW, MARTITA        Group Therapy Note    Attendees: 1/6       Patient was offered group therapy today but declined to participate despite encouragement from staff.  1:1 was offered.       Signature:  GANGA Pope, MARTITA

## 2025-01-20 NOTE — DISCHARGE INSTRUCTIONS
touching your eyes, nose, and mouth with unwashed hands.  Handwashing Tips   Wet your hands with clean, running water (warm or cold), turn off the tap, and apply soap.  Lather your hands by rubbing them together with the soap. Lather the backs of your hands, between your fingers, and under your nails.  Scrub your hands for at least 20 seconds. Need a timer? Hum the “Happy Birthday” song from beginning to end twice.  Rinse your hands well under clean, running water.  Dry your hands using a clean towel or air dry them.  Avoid sharing personal household items   Do not share: You should not share dishes, drinking glasses, cups, eating utensils, towels, or bedding with other people or pets in your home.   Wash thoroughly after use: After using these items, they should be washed thoroughly with soap and water.  Clean all “high-touch” surfaces everyday   Clean and disinfect: Practice routine cleaning of high touch surfaces.  High touch surfaces include counters, tabletops, doorknobs, bathroom fixtures, toilets, phones, keyboards, tablets, and bedside tables.  Disinfect areas with bodily fluids: Also, clean any surfaces that may have blood, stool, or body fluids on them.   Household : Use a household cleaning spray or wipe, according to the label instructions. Labels contain instructions for safe and effective use of the cleaning product including precautions you should take when applying the product, such as wearing gloves and making sure you have good ventilation during use of the product.    Monitor your symptoms   Seek medical attention: Seek prompt medical attention if your illness is worsening     (e.g., difficulty breathing).   Call your doctor: Before seeking care, call your healthcare provider and tell them that you have, or are being evaluated for, COVID-19.   Wear a facemask when sick: Put on a facemask before you enter the facility. These steps will help the healthcare provider's office to keep other

## 2025-01-20 NOTE — CARE COORDINATION
Discharge Arrangements:  [free text here if needed/wanted]    Guardian notified: n/a    Discharge destination/address: [address] [place] [address]    Transported by:  [facility]     Follow up appointment was not scheduled due to the agency's closure in observation of the Holiday. Patient was not accepting of substance abuse referral.      *[place] resources were offered to patient throughout admission and at time of discharge. This list of [facility] [place] providers was provided to patient:     [facility] [place] [facility]  [address] [facility] [address]   [address] [facility] [address]  Phone: [phone]    Phone: [phone]    [facility].  [place]   [address] [facility] [address]  [address] [facility] [address]  Phone: [phone]    Phone: [phone]    [facility]   [facility]  [address] [facility] [address]    [address] [facility] [facility] [address]  Phone: [phone]    Phone: [phone]    [facility]     [facility]   [address] [facility] [address]  [place] [address]      Phone: [phone]  Phone: [phone]        [facility]  [address] [facility] [address]  [address] [place] [address]  Phone: [phone]    Phone:  [phone]    [employer]     [facility]  [address] [facility] [address]  [address] [facility] [address]  Phone: [phone]    Phone: [phone]    [facility]  [address] [facility] [address]  [address] [facility] [address]  Phone: [phone]    Phone: [phone]    [facility]  [facility]  [address] [facility] [address] [address] [facility] [address]  Phone: [phone]    Phone: [phone]    [facility]     [facility] [address]

## 2025-01-21 NOTE — DISCHARGE INSTR - DIET

## 2025-02-14 ENCOUNTER — HOSPITAL ENCOUNTER (INPATIENT)
Age: 25
LOS: 7 days | Discharge: HOME | DRG: 761 | End: 2025-02-21
Attending: EMERGENCY MEDICINE | Admitting: PSYCHIATRY & NEUROLOGY
Payer: COMMERCIAL

## 2025-02-14 DIAGNOSIS — F25.9 SCHIZOAFFECTIVE DISORDER, UNSPECIFIED TYPE (HCC): Primary | ICD-10-CM

## 2025-02-14 LAB
ALBUMIN SERPL-MCNC: 4.9 G/DL (ref 3.5–5.2)
ALP SERPL-CCNC: 82 U/L (ref 35–104)
ALT SERPL-CCNC: 13 U/L (ref 10–35)
AMPHET UR QL SCN: NEGATIVE
ANION GAP SERPL CALCULATED.3IONS-SCNC: 14 MMOL/L (ref 9–16)
AST SERPL-CCNC: 16 U/L (ref 10–35)
BARBITURATES UR QL SCN: NEGATIVE
BASOPHILS # BLD: 0 K/UL (ref 0–0.2)
BASOPHILS NFR BLD: 1 % (ref 0–2)
BENZODIAZ UR QL: NEGATIVE
BILIRUB SERPL-MCNC: 0.5 MG/DL (ref 0–1.2)
BUN SERPL-MCNC: 8 MG/DL (ref 6–20)
CALCIUM SERPL-MCNC: 9.6 MG/DL (ref 8.6–10.4)
CANNABINOIDS UR QL SCN: POSITIVE
CHLORIDE SERPL-SCNC: 105 MMOL/L (ref 98–107)
CO2 SERPL-SCNC: 21 MMOL/L (ref 20–31)
COCAINE UR QL SCN: NEGATIVE
CREAT SERPL-MCNC: 0.6 MG/DL (ref 0.7–1.2)
EOSINOPHIL # BLD: 0 K/UL (ref 0–0.4)
EOSINOPHILS RELATIVE PERCENT: 0 % (ref 0–4)
ERYTHROCYTE [DISTWIDTH] IN BLOOD BY AUTOMATED COUNT: 12.7 % (ref 11.5–14.9)
ETHANOL PERCENT: NORMAL %
ETHANOLAMINE SERPL-MCNC: <10 MG/DL (ref 0–0.08)
FENTANYL UR QL: POSITIVE
GFR, ESTIMATED: >90 ML/MIN/1.73M2
GLUCOSE SERPL-MCNC: 130 MG/DL (ref 74–99)
HCG SERPL QL: NEGATIVE
HCT VFR BLD AUTO: 41.6 % (ref 36–46)
HGB BLD-MCNC: 14.2 G/DL (ref 12–16)
LYMPHOCYTES NFR BLD: 2.5 K/UL (ref 1–4.8)
LYMPHOCYTES RELATIVE PERCENT: 25 % (ref 24–44)
MCH RBC QN AUTO: 31.8 PG (ref 26–34)
MCHC RBC AUTO-ENTMCNC: 34.2 G/DL (ref 31–37)
MCV RBC AUTO: 92.9 FL (ref 80–100)
METHADONE UR QL: NEGATIVE
MONOCYTES NFR BLD: 0.5 K/UL (ref 0.1–1.3)
MONOCYTES NFR BLD: 5 % (ref 1–7)
NEUTROPHILS NFR BLD: 69 % (ref 36–66)
NEUTS SEG NFR BLD: 6.9 K/UL (ref 1.3–9.1)
OPIATES UR QL SCN: NEGATIVE
OXYCODONE UR QL SCN: NEGATIVE
PCP UR QL SCN: NEGATIVE
PLATELET # BLD AUTO: 320 K/UL (ref 150–450)
PMV BLD AUTO: 8 FL (ref 6–12)
POTASSIUM SERPL-SCNC: 3.6 MMOL/L (ref 3.7–5.3)
PROT SERPL-MCNC: 7.9 G/DL (ref 6.6–8.7)
RBC # BLD AUTO: 4.48 M/UL (ref 4–5.2)
SODIUM SERPL-SCNC: 140 MMOL/L (ref 136–145)
TEST INFORMATION: ABNORMAL
WBC OTHER # BLD: 10 K/UL (ref 3.5–11)

## 2025-02-14 PROCEDURE — 80307 DRUG TEST PRSMV CHEM ANLYZR: CPT

## 2025-02-14 PROCEDURE — 85025 COMPLETE CBC W/AUTO DIFF WBC: CPT

## 2025-02-14 PROCEDURE — 99285 EMERGENCY DEPT VISIT HI MDM: CPT

## 2025-02-14 PROCEDURE — 36415 COLL VENOUS BLD VENIPUNCTURE: CPT

## 2025-02-14 PROCEDURE — 80053 COMPREHEN METABOLIC PANEL: CPT

## 2025-02-14 PROCEDURE — 1240000000 HC EMOTIONAL WELLNESS R&B

## 2025-02-14 PROCEDURE — 6370000000 HC RX 637 (ALT 250 FOR IP): Performed by: PSYCHIATRY & NEUROLOGY

## 2025-02-14 PROCEDURE — G0480 DRUG TEST DEF 1-7 CLASSES: HCPCS

## 2025-02-14 PROCEDURE — 84703 CHORIONIC GONADOTROPIN ASSAY: CPT

## 2025-02-14 RX ORDER — ACETAMINOPHEN 325 MG/1
650 TABLET ORAL EVERY 6 HOURS PRN
Status: DISCONTINUED | OUTPATIENT
Start: 2025-02-14 | End: 2025-02-21 | Stop reason: HOSPADM

## 2025-02-14 RX ORDER — DIPHENHYDRAMINE HYDROCHLORIDE 50 MG/ML
50 INJECTION INTRAMUSCULAR; INTRAVENOUS EVERY 6 HOURS PRN
Status: DISCONTINUED | OUTPATIENT
Start: 2025-02-14 | End: 2025-02-21 | Stop reason: HOSPADM

## 2025-02-14 RX ORDER — HYDROXYZINE HYDROCHLORIDE 50 MG/1
50 TABLET, FILM COATED ORAL 3 TIMES DAILY PRN
Status: DISCONTINUED | OUTPATIENT
Start: 2025-02-14 | End: 2025-02-21 | Stop reason: HOSPADM

## 2025-02-14 RX ORDER — LORAZEPAM 2 MG/ML
2 INJECTION INTRAMUSCULAR EVERY 6 HOURS PRN
Status: DISCONTINUED | OUTPATIENT
Start: 2025-02-14 | End: 2025-02-21 | Stop reason: HOSPADM

## 2025-02-14 RX ORDER — MAGNESIUM HYDROXIDE/ALUMINUM HYDROXICE/SIMETHICONE 120; 1200; 1200 MG/30ML; MG/30ML; MG/30ML
30 SUSPENSION ORAL EVERY 6 HOURS PRN
Status: DISCONTINUED | OUTPATIENT
Start: 2025-02-14 | End: 2025-02-21 | Stop reason: HOSPADM

## 2025-02-14 RX ORDER — HALOPERIDOL 5 MG/1
5 TABLET ORAL EVERY 6 HOURS PRN
Status: DISCONTINUED | OUTPATIENT
Start: 2025-02-14 | End: 2025-02-21 | Stop reason: HOSPADM

## 2025-02-14 RX ORDER — IBUPROFEN 400 MG/1
400 TABLET, FILM COATED ORAL EVERY 6 HOURS PRN
Status: DISCONTINUED | OUTPATIENT
Start: 2025-02-14 | End: 2025-02-21 | Stop reason: HOSPADM

## 2025-02-14 RX ORDER — HALOPERIDOL 5 MG/ML
5 INJECTION INTRAMUSCULAR EVERY 6 HOURS PRN
Status: DISCONTINUED | OUTPATIENT
Start: 2025-02-14 | End: 2025-02-21 | Stop reason: HOSPADM

## 2025-02-14 RX ORDER — POLYETHYLENE GLYCOL 3350 17 G
2 POWDER IN PACKET (EA) ORAL
Status: DISCONTINUED | OUTPATIENT
Start: 2025-02-14 | End: 2025-02-21 | Stop reason: HOSPADM

## 2025-02-14 RX ORDER — POLYETHYLENE GLYCOL 3350 17 G/17G
17 POWDER, FOR SOLUTION ORAL DAILY PRN
Status: DISCONTINUED | OUTPATIENT
Start: 2025-02-14 | End: 2025-02-21 | Stop reason: HOSPADM

## 2025-02-14 RX ORDER — LORAZEPAM 1 MG/1
2 TABLET ORAL EVERY 6 HOURS PRN
Status: DISCONTINUED | OUTPATIENT
Start: 2025-02-14 | End: 2025-02-21 | Stop reason: HOSPADM

## 2025-02-14 RX ORDER — TRAZODONE HYDROCHLORIDE 50 MG/1
50 TABLET ORAL NIGHTLY PRN
Status: DISCONTINUED | OUTPATIENT
Start: 2025-02-14 | End: 2025-02-21 | Stop reason: HOSPADM

## 2025-02-14 RX ADMIN — NICOTINE POLACRILEX 2 MG: 2 LOZENGE ORAL at 18:24

## 2025-02-14 RX ADMIN — NICOTINE POLACRILEX 2 MG: 2 LOZENGE ORAL at 14:16

## 2025-02-14 RX ADMIN — HYDROXYZINE HYDROCHLORIDE 50 MG: 50 TABLET, FILM COATED ORAL at 14:16

## 2025-02-14 ASSESSMENT — PATIENT HEALTH QUESTIONNAIRE - PHQ9
2. FEELING DOWN, DEPRESSED OR HOPELESS: NOT AT ALL
1. LITTLE INTEREST OR PLEASURE IN DOING THINGS: NOT AT ALL
SUM OF ALL RESPONSES TO PHQ9 QUESTIONS 1 & 2: 0
SUM OF ALL RESPONSES TO PHQ QUESTIONS 1-9: 0

## 2025-02-14 ASSESSMENT — SLEEP AND FATIGUE QUESTIONNAIRES
AVERAGE NUMBER OF SLEEP HOURS: 4
DO YOU USE A SLEEP AID: NO
DO YOU HAVE DIFFICULTY SLEEPING: YES

## 2025-02-14 NOTE — BH NOTE
Patient arrived on unit transported by two staff members via wheelchair  with signed voluntary in hand. Patient ambulated independently was assessed, vitals obtained, wanded, changed into appropriate clothing and orientated to unit.

## 2025-02-14 NOTE — CARE COORDINATION
Writer attempted assessment, Lisy in room, lights out, sound asleep.   Rendering Text In Billing: The biopsy specimens were grossed and processed into slides.
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
## 2025-02-14 NOTE — BH NOTE
Patient given tobacco quitline number 55799993068 at this time, refusing to call at this time, states \" I just dont want to quit now\"- patient given information as to the dangers of long term tobacco use. Continue to reinforce the importance of tobacco cessation.
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
## 2025-02-15 PROCEDURE — APPSS60 APP SPLIT SHARED TIME 46-60 MINUTES

## 2025-02-15 PROCEDURE — 6370000000 HC RX 637 (ALT 250 FOR IP): Performed by: PSYCHIATRY & NEUROLOGY

## 2025-02-15 PROCEDURE — 1240000000 HC EMOTIONAL WELLNESS R&B

## 2025-02-15 PROCEDURE — 99222 1ST HOSP IP/OBS MODERATE 55: CPT | Performed by: PSYCHIATRY & NEUROLOGY

## 2025-02-15 RX ORDER — RISPERIDONE 1 MG/1
1 TABLET ORAL 2 TIMES DAILY
Status: DISCONTINUED | OUTPATIENT
Start: 2025-02-15 | End: 2025-02-16

## 2025-02-15 RX ADMIN — HYDROXYZINE HYDROCHLORIDE 50 MG: 50 TABLET, FILM COATED ORAL at 07:38

## 2025-02-15 RX ADMIN — TRAZODONE HYDROCHLORIDE 50 MG: 50 TABLET ORAL at 22:47

## 2025-02-15 RX ADMIN — NICOTINE POLACRILEX 2 MG: 2 LOZENGE ORAL at 10:05

## 2025-02-15 RX ADMIN — HYDROXYZINE HYDROCHLORIDE 50 MG: 50 TABLET, FILM COATED ORAL at 16:01

## 2025-02-15 RX ADMIN — HYDROXYZINE HYDROCHLORIDE 50 MG: 50 TABLET, FILM COATED ORAL at 22:47

## 2025-02-15 RX ADMIN — NICOTINE POLACRILEX 2 MG: 2 LOZENGE ORAL at 07:39

## 2025-02-15 RX ADMIN — RISPERIDONE 1 MG: 1 TABLET, FILM COATED ORAL at 22:47

## 2025-02-15 RX ADMIN — POLYETHYLENE GLYCOL 3350 17 G: 17 POWDER, FOR SOLUTION ORAL at 08:57

## 2025-02-15 RX ADMIN — NICOTINE POLACRILEX 2 MG: 2 LOZENGE ORAL at 15:07

## 2025-02-15 NOTE — GROUP NOTE
Group Therapy Note    Date: 2/14/2025    Group Start Time: 2000  Group End Time: 2020  Group Topic: Relaxation    Genie Lee, RN      Group Therapy Note    Attendees: 5/7      Patient's Goal:  To acquire coping skills by developing relaxation techniques    Notes:  Pt attended and actively participated in the Relaxation group this evening.    Status After Intervention:  Improved    Participation Level: Interactive    Participation Quality: Appropriate and Attentive    Speech:  normal    Thought Process/Content: Logical    Affective Functioning: Congruent    Mood: calm and attempting to relax    Level of consciousness:  Alert and Oriented x4    Response to Learning: Progressing to goal    Endings: None Reported    Modes of Intervention: Education, Support, and Exploration    Discipline Responsible: Registered Nurse        Signature:  Genie Ulrich, RN

## 2025-02-15 NOTE — BH NOTE
Patient refused attend morning goals group, denies pain, no distress or concerns noted. Patient refused 1:1 talk time.
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
## 2025-02-16 PROCEDURE — 1240000000 HC EMOTIONAL WELLNESS R&B

## 2025-02-16 PROCEDURE — 6370000000 HC RX 637 (ALT 250 FOR IP): Performed by: PSYCHIATRY & NEUROLOGY

## 2025-02-16 PROCEDURE — 99232 SBSQ HOSP IP/OBS MODERATE 35: CPT | Performed by: PSYCHIATRY & NEUROLOGY

## 2025-02-16 PROCEDURE — APPSS30 APP SPLIT SHARED TIME 16-30 MINUTES

## 2025-02-16 RX ORDER — RISPERIDONE 2 MG/1
2 TABLET ORAL 2 TIMES DAILY
Status: DISCONTINUED | OUTPATIENT
Start: 2025-02-16 | End: 2025-02-21 | Stop reason: HOSPADM

## 2025-02-16 RX ADMIN — NICOTINE POLACRILEX 2 MG: 2 LOZENGE ORAL at 15:53

## 2025-02-16 RX ADMIN — NICOTINE POLACRILEX 2 MG: 2 LOZENGE ORAL at 18:07

## 2025-02-16 RX ADMIN — NICOTINE POLACRILEX 2 MG: 2 LOZENGE ORAL at 11:42

## 2025-02-16 RX ADMIN — NICOTINE POLACRILEX 2 MG: 2 LOZENGE ORAL at 07:33

## 2025-02-16 RX ADMIN — RISPERIDONE 1 MG: 1 TABLET, FILM COATED ORAL at 07:33

## 2025-02-16 RX ADMIN — RISPERIDONE 2 MG: 2 TABLET, FILM COATED ORAL at 21:06

## 2025-02-16 RX ADMIN — NICOTINE POLACRILEX 2 MG: 2 LOZENGE ORAL at 09:39

## 2025-02-16 RX ADMIN — TRAZODONE HYDROCHLORIDE 50 MG: 50 TABLET ORAL at 21:06

## 2025-02-16 RX ADMIN — HYDROXYZINE HYDROCHLORIDE 50 MG: 50 TABLET, FILM COATED ORAL at 21:06

## 2025-02-16 NOTE — GROUP NOTE
Group Therapy Note    Date: 2/16/2025    Group Start Time: 1000  Group End Time: 1030  Group Topic: Psychoeducation    Marietta Tejeda MSW, MARTITA        Group Therapy Note    Attendees: 6/12       Patient was offered group therapy today but declined to participate despite encouragement from staff.  1:1 was offered.       Signature:  GANGA Pope, MARTITA

## 2025-02-17 PROCEDURE — 1240000000 HC EMOTIONAL WELLNESS R&B

## 2025-02-17 PROCEDURE — 6370000000 HC RX 637 (ALT 250 FOR IP): Performed by: PSYCHIATRY & NEUROLOGY

## 2025-02-17 PROCEDURE — 99232 SBSQ HOSP IP/OBS MODERATE 35: CPT | Performed by: PSYCHIATRY & NEUROLOGY

## 2025-02-17 PROCEDURE — 6370000000 HC RX 637 (ALT 250 FOR IP)

## 2025-02-17 PROCEDURE — 99222 1ST HOSP IP/OBS MODERATE 55: CPT

## 2025-02-17 RX ADMIN — RISPERIDONE 2 MG: 2 TABLET, FILM COATED ORAL at 07:48

## 2025-02-17 RX ADMIN — NICOTINE POLACRILEX 2 MG: 2 LOZENGE ORAL at 09:52

## 2025-02-17 RX ADMIN — NICOTINE POLACRILEX 2 MG: 2 LOZENGE ORAL at 12:45

## 2025-02-17 RX ADMIN — NICOTINE POLACRILEX 2 MG: 2 LOZENGE ORAL at 19:59

## 2025-02-17 RX ADMIN — NICOTINE POLACRILEX 2 MG: 2 LOZENGE ORAL at 14:40

## 2025-02-17 RX ADMIN — NICOTINE POLACRILEX 2 MG: 2 LOZENGE ORAL at 17:42

## 2025-02-17 RX ADMIN — RISPERIDONE 2 MG: 2 TABLET, FILM COATED ORAL at 20:52

## 2025-02-17 RX ADMIN — POTASSIUM BICARBONATE 20 MEQ: 782 TABLET, EFFERVESCENT ORAL at 14:40

## 2025-02-17 RX ADMIN — NICOTINE POLACRILEX 2 MG: 2 LOZENGE ORAL at 07:48

## 2025-02-17 ASSESSMENT — PAIN SCALES - GENERAL: PAINLEVEL_OUTOF10: 0
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
## 2025-02-17 NOTE — GROUP NOTE
Psych-Ed/Relapse Prevention Group Note        Date: February 17, 2025 Start Time: 2:30pm  End Time:  3:15pm      Number of Participants in Group & Unit Census:  6/15    Topic: Coping Skills    Goal of Group:Patient will identify benefits of leisure for coping and stress management      Comments:     Patient did not participate in Psych-Ed/Relapse Prevention group, despite staff encouragement and explanation of benefits.  Patient remain seclusive to self.  Q15 minute safety checks maintained for patient safety and will continue to encourage patient to attend unit programming.         Signature:  Suzie Huff CTRS

## 2025-02-17 NOTE — GROUP NOTE
Group Therapy Note    Date: 2/17/2025    Group Start Time: 1100  Group End Time: 1145  Group Topic: Psychoeducation    [facility]    Suzie Huff CTRS    Group Therapy Note    Attendees: 8/16     Patient's Goal:  Patient will identify benefits of leisure for coping and stress management    Notes:  Patient attended group and participated minimally.  Patient participates at times, but often refuses to engage with writer or peers.  Patient's affect is blunted and she does not respond to most cues.    Status After Intervention:  Unchanged    Participation Level: Active Listener and Minimal    Participation Quality: Appropriate, Attentive, and Resistant      Speech:  hesitant      Thought Process/Content: Patient appears to be thought blocking at times, unable to communicate verbally      Affective Functioning: Blunted      Mood: irritable      Level of consciousness:  Alert, Attentive, and Preoccupied      Response to Learning: Able to verbalize current knowledge/experience and Resistant      Endings: None Reported    Modes of Intervention: Education, Support, Socialization, and Exploration      Discipline Responsible: Psychoeducational Specialist      Signature:  ROLA Griffin

## 2025-02-17 NOTE — BH NOTE
Patient didn't participate  in the morning  orientation group despite staff invite to attend. Patient preferred to sleep.

## 2025-02-17 NOTE — GROUP NOTE
Psych-Ed/Relapse Prevention Group Note        Date: February 17, 2025 Start Time: 10am  End Time: 10:45am      Number of Participants in Group & Unit Census:  7/17    Topic: Socialization and Peer Support    Goal of Group:Patient will demonstrate improved interpersonal skills and offer peer support      Comments:     Patient did not participate in Psych-Ed/Relapse Prevention group, despite staff encouragement and explanation of benefits.  Patient remain seclusive to self.  Q15 minute safety checks maintained for patient safety and will continue to encourage patient to attend unit programming.         Signature:  CHELSEA GriffinS

## 2025-02-18 PROCEDURE — 1240000000 HC EMOTIONAL WELLNESS R&B

## 2025-02-18 PROCEDURE — 6370000000 HC RX 637 (ALT 250 FOR IP): Performed by: PSYCHIATRY & NEUROLOGY

## 2025-02-18 PROCEDURE — 99231 SBSQ HOSP IP/OBS SF/LOW 25: CPT

## 2025-02-18 PROCEDURE — APPSS30 APP SPLIT SHARED TIME 16-30 MINUTES

## 2025-02-18 PROCEDURE — 99232 SBSQ HOSP IP/OBS MODERATE 35: CPT | Performed by: PSYCHIATRY & NEUROLOGY

## 2025-02-18 RX ADMIN — RISPERIDONE 2 MG: 2 TABLET, FILM COATED ORAL at 20:42

## 2025-02-18 RX ADMIN — NICOTINE POLACRILEX 2 MG: 2 LOZENGE ORAL at 08:47

## 2025-02-18 RX ADMIN — NICOTINE POLACRILEX 2 MG: 2 LOZENGE ORAL at 18:13

## 2025-02-18 RX ADMIN — NICOTINE POLACRILEX 2 MG: 2 LOZENGE ORAL at 10:48

## 2025-02-18 RX ADMIN — NICOTINE POLACRILEX 2 MG: 2 LOZENGE ORAL at 19:03

## 2025-02-18 RX ADMIN — HYDROXYZINE HYDROCHLORIDE 50 MG: 50 TABLET, FILM COATED ORAL at 10:56

## 2025-02-18 RX ADMIN — NICOTINE POLACRILEX 2 MG: 2 LOZENGE ORAL at 07:00

## 2025-02-18 RX ADMIN — RISPERIDONE 2 MG: 2 TABLET, FILM COATED ORAL at 08:47

## 2025-02-18 RX ADMIN — NICOTINE POLACRILEX 2 MG: 2 LOZENGE ORAL at 13:03

## 2025-02-18 NOTE — GROUP NOTE
Group Therapy Note    Date: 2/18/2025    Group Start Time: 1000  Group End Time: 1045  Group Topic: Psychoeducation    Lauryn Alfonso, RICARDOW        Group Therapy Note    Attendees: 6/16       patient refused to attend psychoeducation group at 10a after encouragement from staff.  1:1 talk time provided as alternative to group session       Protopic Counseling: Patient may experience a mild burning sensation during topical application. Protopic is not approved in children less than 2 years of age. There have been case reports of hematologic and skin malignancies in patients using topical calcineurin inhibitors although causality is questionable.

## 2025-02-18 NOTE — GROUP NOTE
Group Therapy Note    Date: 2/18/2025    Group Start Time: 0927  Group End Time: 0942  Group Topic: Group Documentation    [facility] Ashely Simons LPN    Group Therapy Note         Patient did not participate in Goals  group, despite staff encouragement and explanation of benefits.  Patient remain seclusive to self.  Q15 minute safety checks maintained for patient safety and will continue to encourage patient to attend unit programming.      Signature:  Ashely Greco LPN

## 2025-02-18 NOTE — GROUP NOTE
Psych-Ed/Relapse Prevention Group Note        Date: February 18, 2025 Start Time: 11am  End Time: 11:45am      Number of Participants in Group & Unit Census:  7/18    Topic: Leisure skills    Goal of Group:Patient will identify benefits of leisure for coping and stress management.  Patient will demonstrate improved communication by participating in team activity.        Comments:     Patient did not participate in Psych-Ed/Relapse Prevention group, despite staff encouragement and explanation of benefits.  Patient remain seclusive to self.  Q15 minute safety checks maintained for patient safety and will continue to encourage patient to attend unit programming.         Signature:  CHELSEA GriffinS

## 2025-02-18 NOTE — BH NOTE
Patient approached writer asking what phone numbers were in her chart. Writer explained what numbers were in the chart and the patient requested that all of these numbers be deleted. Writer deleted all of patient's emergency contacts at this time at patient's request.

## 2025-02-18 NOTE — GROUP NOTE
Group Therapy Note    Date: 2/18/2025    Group Start Time: 1330  Group End Time: 1415  Group Topic: Psychoeducation    [facility]    Suzie Huff CTRS    Group Therapy Note    Attendees: 8/16     Patient's Goal:  Patient will identify benefits of creative expression for coping and stress management    Notes:  Patient attended group and participated    Status After Intervention:  Improved    Participation Level: Interactive    Participation Quality: Appropriate, Attentive      Speech:  normal      Thought Process/Content: Logical and Linear      Affective Functioning: Congruent      Mood: Euthymic      Level of consciousness:  Alert, Orientedx4 and Preoccupied      Response to Learning: Able to verbalize current knowledge/experience and Able to verbalize/acknowledge new learning      Endings: None Reported    Modes of Intervention: Education, Support, Socialization, and Exploration      Discipline Responsible: Psychoeducational Specialist      Signature:  ROLA Griffin

## 2025-02-19 PROCEDURE — 6370000000 HC RX 637 (ALT 250 FOR IP): Performed by: PSYCHIATRY & NEUROLOGY

## 2025-02-19 PROCEDURE — 99231 SBSQ HOSP IP/OBS SF/LOW 25: CPT

## 2025-02-19 PROCEDURE — 1240000000 HC EMOTIONAL WELLNESS R&B

## 2025-02-19 PROCEDURE — 99232 SBSQ HOSP IP/OBS MODERATE 35: CPT | Performed by: PSYCHIATRY & NEUROLOGY

## 2025-02-19 RX ADMIN — NICOTINE POLACRILEX 2 MG: 2 LOZENGE ORAL at 11:41

## 2025-02-19 RX ADMIN — NICOTINE POLACRILEX 2 MG: 2 LOZENGE ORAL at 08:26

## 2025-02-19 RX ADMIN — RISPERIDONE 2 MG: 2 TABLET, FILM COATED ORAL at 21:04

## 2025-02-19 RX ADMIN — RISPERIDONE 2 MG: 2 TABLET, FILM COATED ORAL at 08:23

## 2025-02-19 RX ADMIN — NICOTINE POLACRILEX 2 MG: 2 LOZENGE ORAL at 18:13

## 2025-02-19 RX ADMIN — HYDROXYZINE HYDROCHLORIDE 50 MG: 50 TABLET, FILM COATED ORAL at 22:19

## 2025-02-19 RX ADMIN — NICOTINE POLACRILEX 2 MG: 2 LOZENGE ORAL at 21:23

## 2025-02-19 ASSESSMENT — PAIN SCALES - GENERAL: PAINLEVEL_OUTOF10: 0

## 2025-02-19 NOTE — GROUP NOTE
Psych-Ed/Relapse Prevention Group Note        Date: February 19, 2025 Start Time: 11am  End Time: 11:30am      Number of Participants in Group & Unit Census:  4/17    Topic: Patient Advisory    Goal of Group:Patient will identify benefits of mental health services and hospital admission.  Patient will identify beneficial ways to advocate for care.      Comments:     Patient did not participate in Psych-Ed/Relapse Prevention group, despite staff encouragement and explanation of benefits.  Patient remain seclusive to self.  Q15 minute safety checks maintained for patient safety and will continue to encourage patient to attend unit programming.         Signature:  CHELSEA GriffinS

## 2025-02-19 NOTE — GROUP NOTE
Psych-Ed/Relapse Prevention Group Note        Date: February 19, 2025 Start Time: 1:30pm  End Time:  2:15pm      Number of Participants in Group & Unit Census:  7/15    Topic: Socialization    Goal of Group:Patient will demonstrate improved interpersonal skills and offer peer support.       Comments:     Patient did not participate in Psych-Ed/Relapse Prevention group, despite staff encouragement and explanation of benefits.  Patient remain seclusive to self.  Q15 minute safety checks maintained for patient safety and will continue to encourage patient to attend unit programming.         Signature:  Suzie Huff CTRS

## 2025-02-19 NOTE — GROUP NOTE
Group Therapy Note    Date: 2/19/2025    Group Start Time: 1000  Group End Time: 1045  Group Topic: Psychoeducation    [facility] StepLauryn Kapoor, LSW        Group Therapy Note    Attendees: 10/16       patient refused to attend psychoeducation group at 10a after encouragement from staff.  1:1 talk time provided as alternative to group session

## 2025-02-19 NOTE — GROUP NOTE
Group Therapy Note    Date: 2/18/2025    Group Start Time: 2000  Group End Time: 2015  Group Topic: Wrap-Up    [facility] StepKassy Prado        Group Therapy Note    Attendees:7/14       Patient's Goal:    Notes:      Status After Intervention:  Improved    Participation Level: Active Listener    Participation Quality: Appropriate and Attentive      Speech:  normal      Thought Process/Content: Logical      Affective Functioning: Congruent      Mood: anxious      Level of consciousness:  Alert      Response to Learning: Able to verbalize current knowledge/experience and Able to verbalize/acknowledge new learning      Endings: None Reported    Modes of Intervention: Education and Support      Discipline Responsible: Behavorial Health Tech      Signature:  Kassy Jensen

## 2025-02-19 NOTE — GROUP NOTE
Group Therapy Note    Date: 2/19/2025    Group Start Time: 0927  Group End Time: 0949  Group Topic: Group Documentation    [facility]    Ashely Greco LPN        Group Therapy Note         Patient did not participate in Goals  group, despite staff encouragement and explanation of benefits.  Patient remain seclusive to self.  Q15 minute safety checks maintained for patient safety and will continue to encourage patient to attend unit programming.         Signature:  Ashely Greco LPN

## 2025-02-19 NOTE — GROUP NOTE
Group Therapy Note    Date: 2/19/2025    Group Start Time: 1430  Group End Time: 1454  Group Topic: Nursing    [facility]    Remedios Elias LPN        Group Therapy Note    Attendees: 5/15   Patient did not participate in nursing  group, despite staff encouragement and explanation of benefits.  Patient remain seclusive to self.  Q15 minute safety checks maintained for patient safety and will continue to encourage patient to attend unit programming.      Discipline Responsible: Licensed Practical Nurse      Signature:  Remedios Elias LPN

## 2025-02-20 PROCEDURE — 6370000000 HC RX 637 (ALT 250 FOR IP): Performed by: PSYCHIATRY & NEUROLOGY

## 2025-02-20 PROCEDURE — 1240000000 HC EMOTIONAL WELLNESS R&B

## 2025-02-20 PROCEDURE — 99231 SBSQ HOSP IP/OBS SF/LOW 25: CPT

## 2025-02-20 RX ORDER — HYDROXYZINE HYDROCHLORIDE 50 MG/1
50 TABLET, FILM COATED ORAL 3 TIMES DAILY PRN
Qty: 30 TABLET | Refills: 0 | Status: SHIPPED | OUTPATIENT
Start: 2025-02-20 | End: 2025-03-02

## 2025-02-20 RX ORDER — POLYETHYLENE GLYCOL 3350 17 G
2 POWDER IN PACKET (EA) ORAL
Qty: 100 EACH | Refills: 0 | Status: SHIPPED | OUTPATIENT
Start: 2025-02-20

## 2025-02-20 RX ORDER — RISPERIDONE 2 MG/1
2 TABLET ORAL 2 TIMES DAILY
Qty: 60 TABLET | Refills: 0 | Status: SHIPPED | OUTPATIENT
Start: 2025-02-20

## 2025-02-20 RX ADMIN — RISPERIDONE 2 MG: 2 TABLET, FILM COATED ORAL at 20:35

## 2025-02-20 RX ADMIN — NICOTINE POLACRILEX 2 MG: 2 LOZENGE ORAL at 17:36

## 2025-02-20 RX ADMIN — TRAZODONE HYDROCHLORIDE 50 MG: 50 TABLET ORAL at 20:35

## 2025-02-20 RX ADMIN — RISPERIDONE 2 MG: 2 TABLET, FILM COATED ORAL at 08:39

## 2025-02-20 RX ADMIN — NICOTINE POLACRILEX 2 MG: 2 LOZENGE ORAL at 09:04

## 2025-02-20 RX ADMIN — HYDROXYZINE HYDROCHLORIDE 50 MG: 50 TABLET, FILM COATED ORAL at 20:35

## 2025-02-20 RX ADMIN — NICOTINE POLACRILEX 2 MG: 2 LOZENGE ORAL at 07:21

## 2025-02-20 RX ADMIN — NICOTINE POLACRILEX 2 MG: 2 LOZENGE ORAL at 19:22

## 2025-02-20 RX ADMIN — NICOTINE POLACRILEX 2 MG: 2 LOZENGE ORAL at 15:27

## 2025-02-20 RX ADMIN — NICOTINE POLACRILEX 2 MG: 2 LOZENGE ORAL at 12:26

## 2025-02-20 ASSESSMENT — PAIN SCALES - GENERAL: PAINLEVEL_OUTOF10: 0

## 2025-02-20 NOTE — GROUP NOTE
Group Therapy Note    Date: 2/20/2025    Group Start Time: 0900  Group End Time: 0930  Group Topic: Community Meeting    Shey Teran    Morning Goals Group Note    Attendees: 7/16        Comments:     Patient did not participate in Morning Goals Group, despite staff encouragement and explanation of benefits.  Patient remains seclusive to self.  Q15 minute safety checks maintained for patient safety and will continue to encourage patient to attend unit programming.

## 2025-02-20 NOTE — GROUP NOTE
Psych-Ed/Relapse Prevention Group Note        Date: February 20, 2025 Start Time: 1:30pm  End Time:  2:15pm      Number of Participants in Group & Unit Census:  6/14    Topic: Coping skills    Goal of Group:Patient will identify healthy coping skills and offer peer support      Comments:     Patient did not participate in Psych-Ed/Relapse Prevention group, despite staff encouragement and explanation of benefits.  Patient remain seclusive to self.  Q15 minute safety checks maintained for patient safety and will continue to encourage patient to attend unit programming.         Signature:  Suzie Huff CTRS

## 2025-02-20 NOTE — GROUP NOTE
Group Therapy Note    Date: 2/19/2025    Group Start Time: 2000  Group End Time: 2045  Group Topic: Wrap-Up    [facility]    Gross, Clem        Group Therapy Note    Attendees: 5/16 did not attend        patient refused to attend wrap-up group at 2000 after encouragement from staff.  1:1 talk time provided as alternative to group session       Signature:  Clem Mckenna

## 2025-02-20 NOTE — GROUP NOTE
Group Therapy Note    Date: 2/20/2025    Group Start Time: 0930  Group End Time: 1000  Group Topic: Healthy Living/Wellness    [facility] StepShey Ahumada    Morning Stretches Group Note    Attendees: 5/16       Group Topic: Exercise/stretching      Patient's Goal: Increase blood flow and oxygen to muscles      Notes: Patient participated in exercise activity and discussion about using exercise                                            as a coping skill.         Status After Intervention: Improved       Participation Level: Active Listener and Interactive       Participation Quality: Appropriate, Attentive, Sharing, and Supportive       Speech: normal       Thought Process/Content: Logical, Linear       Affective Functioning: Congruent       Mood: anxious       Level of consciousness: Oriented x4       Response to Learning: Able to verbalize current knowledge/experience, Able to                                         verbalize/acknowledge new learning, Able to retain                                         information, and Capable of insight        Endings: None Reported       Modes of Intervention: Education, Movement, Socialization         Discipline Responsible: Be[employer] Health Tech       Signature: Shey Ford

## 2025-02-20 NOTE — GROUP NOTE
Psych-Ed/Relapse Prevention Group Note        Date: February 20, 2025 Start Time: 10am  End Time: 10:45am      Number of Participants in Group & Unit Census:  7/16    Topic: Communication skills    Goal of Group:Patient will demonstrate improved communication skills      Comments:     Patient did not participate in Psych-Ed/Relapse Prevention group, despite staff encouragement and explanation of benefits.  Patient remain seclusive to self.  Q15 minute safety checks maintained for patient safety and will continue to encourage patient to attend unit programming.         Signature:  CHELSEA GriffinS

## 2025-02-20 NOTE — GROUP NOTE
Group Therapy Note    Date: 2/20/2025    Group Start Time: 1100  Group End Time: 1200  Group Topic: Healthy Living/Wellness    [facility] Shey Royal    Group Therapy Note    Attendees: 9/16       Comments:    Patient did not participate in Coping Skills Group, despite staff encouragement and explanation of benefits.  Patient remains seclusive to self.  Q15 minute safety checks maintained for patient safety and will continue to encourage patient to attend unit programming.

## 2025-02-21 VITALS
SYSTOLIC BLOOD PRESSURE: 126 MMHG | BODY MASS INDEX: 25.76 KG/M2 | HEART RATE: 109 BPM | DIASTOLIC BLOOD PRESSURE: 84 MMHG | OXYGEN SATURATION: 100 % | HEIGHT: 62 IN | TEMPERATURE: 98 F | RESPIRATION RATE: 14 BRPM | WEIGHT: 140 LBS

## 2025-02-21 PROCEDURE — 6370000000 HC RX 637 (ALT 250 FOR IP): Performed by: PSYCHIATRY & NEUROLOGY

## 2025-02-21 RX ADMIN — NICOTINE POLACRILEX 2 MG: 2 LOZENGE ORAL at 15:04

## 2025-02-21 RX ADMIN — NICOTINE POLACRILEX 2 MG: 2 LOZENGE ORAL at 09:32

## 2025-02-21 RX ADMIN — NICOTINE POLACRILEX 2 MG: 2 LOZENGE ORAL at 08:46

## 2025-02-21 RX ADMIN — RISPERIDONE 2 MG: 2 TABLET, FILM COATED ORAL at 08:46

## 2025-02-21 RX ADMIN — NICOTINE POLACRILEX 2 MG: 2 LOZENGE ORAL at 13:11

## 2025-02-21 RX ADMIN — NICOTINE POLACRILEX 2 MG: 2 LOZENGE ORAL at 07:04

## 2025-02-21 RX ADMIN — NICOTINE POLACRILEX 2 MG: 2 LOZENGE ORAL at 10:29

## 2025-02-21 ASSESSMENT — PAIN SCALES - GENERAL: PAINLEVEL_OUTOF10: 0
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
## 2025-02-21 NOTE — GROUP NOTE
Group Therapy Note    Date: 2/20/2025    Group Start Time: 2000  Group End Time: 2030  Group Topic: Wrap-Up    [facility] StepSeema Sloan, RN        Group Therapy Note    Attendees: 6/12     Patient refused to participate in group despite encouraged participation.

## 2025-02-21 NOTE — GROUP NOTE
Group Therapy Note    Date: 2/21/2025    Group Start Time: 1100  Group End Time: 1150  Group Topic: Music Therapy    DELLA Gibbs    Nacho Bianchi    Music Therapy Group Note        Date: 2/21/2025 Start Time: 1100  End Time: 1150      Number of Participants in Group & Unit Census:  7/14    Topic: Patients shared songs, and asked to analyze lyrics for themes, sharing advice based on themes within music.     Goal of Group: Goals to engage in peer support; Increase self-expression; Increase sense of community; Increase rapport with with staff;      Comments:     Patient did not participate in Music Therapy group, despite staff encouragement and explanation of benefits.  Patient remain seclusive to self.  Q15 minute safety checks maintained for patient safety and will continue to encourage patient to attend unit programming.

## 2025-02-21 NOTE — BH NOTE
Patient didn't participate in the morning goal setting group despite staff invite to attend. Patient preferred to sleep.

## 2025-02-21 NOTE — GROUP NOTE
Patient was offered group therapy today but declined to participate despite encouragement from staff.  1:1 was offered.

## 2025-02-21 NOTE — PLAN OF CARE
Problem: Anxiety  Goal: Will report anxiety at manageable levels  Description: INTERVENTIONS:  1. Administer medication as ordered  2. Teach and rehearse alternative coping skills  3. Provide emotional support with 1:1 interaction with staff  2/18/2025 1102 by Driss Brown LPN  Outcome: Not Progressing     Problem: Behavior  Goal: Pt/Family maintain appropriate behavior and adhere to behavioral management agreement, if implemented  Description: INTERVENTIONS:  1. Assess patient/family's coping skills and  non-compliant behavior (including use of illegal substances)  2. Notify security of behavior or suspected illegal substances which indicate the need for search of the family and/or belongings  3. Encourage verbalization of thoughts and concerns in a socially appropriate manner  4. Utilize positive, consistent limit setting strategies supporting safety of patient, staff and others  5. Encourage participation in the decision making process about the behavioral management agreement  6. If a visitor's behavior poses a threat to safety call refer to organization policy.  7. Initiate consult with , Psychosocial CNS, Spiritual Care as appropriate  2/18/2025 1102 by Driss Brown LPN  Outcome: Progressing     Problem: Anxiety  Goal: Will report anxiety at manageable levels  Description: INTERVENTIONS:  1. Administer medication as ordered  2. Teach and rehearse alternative coping skills  3. Provide emotional support with 1:1 interaction with staff  2/18/2025 1102 by Driss Brown LPN  Outcome: Not Progressing  Note: Patient appears anxious and guarded with peers and staff.  Patient is isolative, withdrawn and not interactive. Patient encouraged to attend groups to learn coping skills. Patient did not voice understanding. Q 15 minute checks done on pt for safety      
  Problem: Anxiety  Goal: Will report anxiety at manageable levels  Description: INTERVENTIONS:  1. Administer medication as ordered  2. Teach and rehearse alternative coping skills  3. Provide emotional support with 1:1 interaction with staff  Note: Patient denies suicidal thoughts and hallucinations. She reports depression and anxiety have improved, although she was seen crying in the dayroom. When asked she did not want to discuss why she was upset. She did state she was being discharged tomorrow and feels ready to go. She has been out in the dayroom but isolative to self. Q15min safety checks continue     Problem: Psychosis  Goal: Will report no hallucinations or delusions  Description: INTERVENTIONS:  1. Administer medication as  ordered  2. Assist with reality testing to support increasing orientation  3. Assess if patient's hallucinations or delusions are encouraging self harm or harm to others and intervene as appropriate  Note: Patient denies hallucinations this evening. She has been isolative to self all evening but states readiness for discharge tomorrow. Q15min safety checks continue     
  Problem: Behavior  Goal: Pt/Family maintain appropriate behavior and adhere to behavioral management agreement, if implemented  Description: INTERVENTIONS:  1. Assess patient/family's coping skills and  non-compliant behavior (including use of illegal substances)  2. Notify security of behavior or suspected illegal substances which indicate the need for search of the family and/or belongings  3. Encourage verbalization of thoughts and concerns in a socially appropriate manner  4. Utilize positive, consistent limit setting strategies supporting safety of patient, staff and others  5. Encourage participation in the decision making process about the behavioral management agreement  6. If a visitor's behavior poses a threat to safety call refer to organization policy.  7. Initiate consult with , Psychosocial CNS, Spiritual Care as appropriate  2/16/2025 0122 by Rosa Isela Sena RN  Outcome: Progressing     Problem: Anxiety  Goal: Will report anxiety at manageable levels  Description: INTERVENTIONS:  1. Administer medication as ordered  2. Teach and rehearse alternative coping skills  3. Provide emotional support with 1:1 interaction with staff  2/16/2025 0122 by Rosa Isela Sena, KEITH  Outcome: Progressing     Problem: Psychosis  Goal: Will report no hallucinations or delusions  Description: INTERVENTIONS:  1. Administer medication as  ordered  2. Assist with reality testing to support increasing orientation  3. Assess if patient's hallucinations or delusions are encouraging self harm or harm to others and intervene as appropriate  2/16/2025 0122 by Rosa Isela Sena, KEITH  Outcome: Progressing    Patient endorses anxiety and depression at this time. Patient continues to be medication seeking requesting Seroquel. Patient has been isolative, irritable and guarded. Patient has continued to be medication compliant. Writer will continue to monitor patient status and offer emotional support. [id number] checks 
  Problem: Behavior  Goal: Pt/Family maintain appropriate behavior and adhere to behavioral management agreement, if implemented  Description: INTERVENTIONS:  1. Assess patient/family's coping skills and  non-compliant behavior (including use of illegal substances)  2. Notify security of behavior or suspected illegal substances which indicate the need for search of the family and/or belongings  3. Encourage verbalization of thoughts and concerns in a socially appropriate manner  4. Utilize positive, consistent limit setting strategies supporting safety of patient, staff and others  5. Encourage participation in the decision making process about the behavioral management agreement  6. If a visitor's behavior poses a threat to safety call refer to organization policy.  7. Initiate consult with , Psychosocial CNS, Spiritual Care as appropriate  2/16/2025 0907 by Daniella Muse RN  Outcome: Progressing   Demonstrating appropriate behavior  Problem: Anxiety  Goal: Will report anxiety at manageable levels  Description: INTERVENTIONS:  1. Administer medication as ordered  2. Teach and rehearse alternative coping skills  3. Provide emotional support with 1:1 interaction with staff  2/16/2025 0907 by Daniella Muse RN  Outcome: Progressing   4/10  Problem: Psychosis  Goal: Will report no hallucinations or delusions  Description: INTERVENTIONS:  1. Administer medication as  ordered  2. Assist with reality testing to support increasing orientation  3. Assess if patient's hallucinations or delusions are encouraging self harm or harm to others and intervene as appropriate  2/16/2025 0907 by Daniella Muse, RN  Outcome: Progressing   Took am meds, no reports of hallucinations  
  Problem: Behavior  Goal: Pt/Family maintain appropriate behavior and adhere to behavioral management agreement, if implemented  Description: INTERVENTIONS:  1. Assess patient/family's coping skills and  non-compliant behavior (including use of illegal substances)  2. Notify security of behavior or suspected illegal substances which indicate the need for search of the family and/or belongings  3. Encourage verbalization of thoughts and concerns in a socially appropriate manner  4. Utilize positive, consistent limit setting strategies supporting safety of patient, staff and others  5. Encourage participation in the decision making process about the behavioral management agreement  6. If a visitor's behavior poses a threat to safety call refer to organization policy.  7. Initiate consult with , Psychosocial CNS, Spiritual Care as appropriate  2/16/2025 2104 by Amee Sousa, RN  Outcome: Progressing  Note: Patient remains behavioral compliant at this time. Patient encouraged to notify staff if in emotional distress. Staff ensures safety by providing safety checks on the unit intermittently and every 15 minutes. Staff continues to provide a safe environment.      Problem: Anxiety  Goal: Will report anxiety at manageable levels  Description: INTERVENTIONS:  1. Administer medication as ordered  2. Teach and rehearse alternative coping skills  3. Provide emotional support with 1:1 interaction with staff  2/16/2025 2104 by Amee Sousa, RN  Outcome: Progressing  Note: Patient reports anxiety at this time. Effective coping behaviors explored with patient, emotional support and reassurance provided as needed. PRN anti-anxiety and sleep aid medications given to help patient relax and have a restful sleep.       
  Problem: Behavior  Goal: Pt/Family maintain appropriate behavior and adhere to behavioral management agreement, if implemented  Description: INTERVENTIONS:  1. Assess patient/family's coping skills and  non-compliant behavior (including use of illegal substances)  2. Notify security of behavior or suspected illegal substances which indicate the need for search of the family and/or belongings  3. Encourage verbalization of thoughts and concerns in a socially appropriate manner  4. Utilize positive, consistent limit setting strategies supporting safety of patient, staff and others  5. Encourage participation in the decision making process about the behavioral management agreement  6. If a visitor's behavior poses a threat to safety call refer to organization policy.  7. Initiate consult with , Psychosocial CNS, Spiritual Care as appropriate  2/17/2025 0954 by Alex Hyde, RN  Outcome: Progressing     Problem: Anxiety  Goal: Will report anxiety at manageable levels  Description: INTERVENTIONS:  1. Administer medication as ordered  2. Teach and rehearse alternative coping skills  3. Provide emotional support with 1:1 interaction with staff  2/17/2025 0954 by Alex Hyde, RN  Outcome: Progressing     Problem: Psychosis  Goal: Will report no hallucinations or delusions  Description: INTERVENTIONS:  1. Administer medication as  ordered  2. Assist with reality testing to support increasing orientation  3. Assess if patient's hallucinations or delusions are encouraging self harm or harm to others and intervene as appropriate  2/17/2025 0954 by Alex Hyde, RN  Outcome: Progressing   Patient currently denies thoughts of self harm or suicidal ideation. Denies any hallucinations or delusions. Patient has slept well . Is eating well patient attends some groups. Mood is anxious. Safety checks continue every 15 minutes. Patient has remained safe. Will continue to support and monitor.     
  Problem: Behavior  Goal: Pt/Family maintain appropriate behavior and adhere to behavioral management agreement, if implemented  Description: INTERVENTIONS:  1. Assess patient/family's coping skills and  non-compliant behavior (including use of illegal substances)  2. Notify security of behavior or suspected illegal substances which indicate the need for search of the family and/or belongings  3. Encourage verbalization of thoughts and concerns in a socially appropriate manner  4. Utilize positive, consistent limit setting strategies supporting safety of patient, staff and others  5. Encourage participation in the decision making process about the behavioral management agreement  6. If a visitor's behavior poses a threat to safety call refer to organization policy.  7. Initiate consult with , Psychosocial CNS, Spiritual Care as appropriate  2/20/2025 0018 by Luis Arnold RN  Outcome: Progressing  Note: Patient has been in behavior control this shift so far. Patient has been taking medications as prescribed. Q 15 minute checks done on pt for safety      Problem: Anxiety  Goal: Will report anxiety at manageable levels  Description: INTERVENTIONS:  1. Administer medication as ordered  2. Teach and rehearse alternative coping skills  3. Provide emotional support with 1:1 interaction with staff  Outcome: Progressing  Note: Patient reports increased anxiety related to being paranoid about another patient on unit. Patient was given 1:1 talk time, was moved further away from other patient on unit and given quiet time in room. Patient requested atarax be given to calm her enough to be able to sleep. Medication provided as ordered. Q 15 minute checks done on pt for safety      Problem: Psychosis  Goal: Will report no hallucinations or delusions  Description: INTERVENTIONS:  1. Administer medication as  ordered  2. Assist with reality testing to support increasing orientation  3. Assess if patient's 
  Problem: Behavior  Goal: Pt/Family maintain appropriate behavior and adhere to behavioral management agreement, if implemented  Description: INTERVENTIONS:  1. Assess patient/family's coping skills and  non-compliant behavior (including use of illegal substances)  2. Notify security of behavior or suspected illegal substances which indicate the need for search of the family and/or belongings  3. Encourage verbalization of thoughts and concerns in a socially appropriate manner  4. Utilize positive, consistent limit setting strategies supporting safety of patient, staff and others  5. Encourage participation in the decision making process about the behavioral management agreement  6. If a visitor's behavior poses a threat to safety call refer to organization policy.  7. Initiate consult with , Psychosocial CNS, Spiritual Care as appropriate  2/20/2025 1235 by Bev Peterson LPN  Outcome: Progressing     Problem: Pain  Goal: Verbalizes/displays adequate comfort level or baseline comfort level  2/20/2025 1235 by Bev Peterson LPN  Outcome: Progressing  Flowsheets (Taken 2/20/2025 0018 by Luis Arnold, RN)  Verbalizes/displays adequate comfort level or baseline comfort level:   Encourage patient to monitor pain and request assistance   Assess pain using appropriate pain scale   Administer analgesics based on type and severity of pain and evaluate response   Patient remains safe on the unit free from falls and self harm.  Patient denies pain at this time.  Patient remains medication compliant and behaviorally controlled.  
  Problem: Behavior  Goal: Pt/Family maintain appropriate behavior and adhere to behavioral management agreement, if implemented  Description: INTERVENTIONS:  1. Assess patient/family's coping skills and  non-compliant behavior (including use of illegal substances)  2. Notify security of behavior or suspected illegal substances which indicate the need for search of the family and/or belongings  3. Encourage verbalization of thoughts and concerns in a socially appropriate manner  4. Utilize positive, consistent limit setting strategies supporting safety of patient, staff and others  5. Encourage participation in the decision making process about the behavioral management agreement  6. If a visitor's behavior poses a threat to safety call refer to organization policy.  7. Initiate consult with , Psychosocial CNS, Spiritual Care as appropriate  Outcome: Progressing    Pt has been pleasant and cooperative with staff. Pt is social with peers throughout the shift. Pt denies having suicidal or homicidal ideations. Pt denies having hallucinations and has not been seen responding to internal stimulus.       Problem: Psychosis  Goal: Will report no hallucinations or delusions  Description: INTERVENTIONS:  1. Administer medication as  ordered  2. Assist with reality testing to support increasing orientation  3. Assess if patient's hallucinations or delusions are encouraging self harm or harm to others and intervene as appropriate  Outcome: Progressing    Pt has not been seen responding to internal stimulus.     
  Problem: Behavior  Goal: Pt/Family maintain appropriate behavior and adhere to behavioral management agreement, if implemented  Description: INTERVENTIONS:  1. Assess patient/family's coping skills and  non-compliant behavior (including use of illegal substances)  2. Notify security of behavior or suspected illegal substances which indicate the need for search of the family and/or belongings  3. Encourage verbalization of thoughts and concerns in a socially appropriate manner  4. Utilize positive, consistent limit setting strategies supporting safety of patient, staff and others  5. Encourage participation in the decision making process about the behavioral management agreement  6. If a visitor's behavior poses a threat to safety call refer to organization policy.  7. Initiate consult with , Psychosocial CNS, Spiritual Care as appropriate  Outcome: Progressing   Controled behavior on the unit  Problem: Anxiety  Goal: Will report anxiety at manageable levels  Description: INTERVENTIONS:  1. Administer medication as ordered  2. Teach and rehearse alternative coping skills  3. Provide emotional support with 1:1 interaction with staff  Outcome: Progressing  5/10   Problem: Psychosis  Goal: Will report no hallucinations or delusions  Description: INTERVENTIONS:  1. Administer medication as  ordered  2. Assist with reality testing to support increasing orientation  3. Assess if patient's hallucinations or delusions are encouraging self harm or harm to others and intervene as appropriate  Outcome: Progressing   No command AH  
  Problem: Behavior  Goal: Pt/Family maintain appropriate behavior and adhere to behavioral management agreement, if implemented  Description: INTERVENTIONS:  1. Assess patient/family's coping skills and  non-compliant behavior (including use of illegal substances)  2. Notify security of behavior or suspected illegal substances which indicate the need for search of the family and/or belongings  3. Encourage verbalization of thoughts and concerns in a socially appropriate manner  4. Utilize positive, consistent limit setting strategies supporting safety of patient, staff and others  5. Encourage participation in the decision making process about the behavioral management agreement  6. If a visitor's behavior poses a threat to safety call refer to organization policy.  7. Initiate consult with , Psychosocial CNS, Spiritual Care as appropriate  Outcome: Progressing  Flowsheets (Taken 2/14/2025 2307)  Patient/family maintains appropriate behavior and adheres to behavioral management agreement, if implemented:   Assess patient/family’s coping skills and  non-compliant behavior (including use of illegal substances)   Encourage verbalization of thoughts and concerns in a socially appropriate manner   Encourage participation in the decision making process about the behavioral management agreement  Note: Patient maintains appropriate behavior on the unit, compliant with prescribed medications.       Problem: Anxiety  Goal: Will report anxiety at manageable levels  Description: INTERVENTIONS:  1. Administer medication as ordered  2. Teach and rehearse alternative coping skills  3. Provide emotional support with 1:1 interaction with staff  Outcome: Progressing  Flowsheets (Taken 2/14/2025 2307)  Will report anxiety at manageable levels:   Teach and rehearse alternative coping skills   Provide emotional support with 1:1 interaction with staff  Note: Patient reports moderate level of anxiety, rates it at 6 on a 
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 Auditory and visual hallucinations. Recent admissions to this facility. Reports being off medications.  Estimated Length of Stay: 5-7 days  Estimated Discharge Date Update: to be determined by physician    PATIENT STRENGTHS:  Patient Strengths    Patient Strengths and Limitations:Limitations: Apathetic / unmotivated, Difficulty problem solving/relies on others to help solve problems, Multiple barriers to leisure interests  Addictive Behavior:Addictive Behavior  In the Past 3 Months, Have You Felt or Has Someone Told You That You Have a Problem With  : None  Medical Problems:  Past Medical History:   Diagnosis Date    ADHD (attention deficit hyperactivity disorder)     Anxiety     Depression     Schizo-affective schizophrenia (HCC)        Risk:  Fall Risk   Alex Scale Alex Scale Score: 22  BVC    Change in scores no Changes to plan of Care no    Status EXAM:   Mental Status and Behavioral Exam  Normal: No (anxious)  Level of Assistance: Independent/Self  Facial Expression: Flat  Affect: Congruent  Level of Consciousness: Alert  Frequency of Checks: 4 times per hour, close  Mood:Normal: No  Mood: Anxious  Motor Activity:Normal: No  Motor Activity: Decreased  Eye Contact: Fair  Observed Behavior: Cooperative, Guarded  Sexual Misconduct History: Current - no  Preception: [place] to person, [place] to time, [place] to place, [place] to situation  Attention:Normal: No  Attention: Distractible  Thought Processes: Circumstantial  Thought Content:Normal: No  Thought Content: Compulsions  Depression Symptoms: Isolative  Anxiety Symptoms: Generalized  Linda Symptoms: No problems reported or observed.  Hallucinations: None  Delusions: No  Delusions: 
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 Auditory and visual hallucinations. Recent admissions to this facility. Reports being off medications.    Estimated Length of Stay:  5-7days  Estimated Discharge Date: To be determined by physician.    PATIENT STRENGTHS:  Patient Strengths:   Patient Strengths and Limitations:Limitations: Apathetic / unmotivated, Difficulty problem solving/relies on others to help solve problems, Multiple barriers to leisure interests  Addictive Behavior: Addictive Behavior  In the Past 3 Months, Have You Felt or Has Someone Told You That You Have a Problem With  : None  Medical Problems:  Past Medical History:   Diagnosis Date    ADHD (attention deficit hyperactivity disorder)     Anxiety     Depression     Schizo-affective schizophrenia (HCC)      Status EXAM:Mental Status and Behavioral Exam  Normal: No  Level of Assistance: Independent/Self  Facial Expression: Flat, Worried  Affect: Blunt  Level of Consciousness: Alert  Frequency of Checks: 4 times per hour, close  Mood:Normal: No  Mood: Anxious  Motor Activity:Normal: Yes  Motor Activity: Decreased  Eye Contact: Good  Observed Behavior: Cooperative  Sexual Misconduct History: Current - no  Preception: [place] to person, [place] to time, [place] to place, [place] to situation  Attention:Normal: No  Attention: Distractible  Thought Processes: Blocking  Thought Content:Normal: No  Thought Content: Preoccupations  Depression Symptoms: Isolative  Anxiety Symptoms: Generalized  Linda Symptoms: No problems reported or observed.  Hallucinations: Auditory (comment) (states no commands)  Delusions: Yes  Delusions: Paranoid  Memory:Normal: No  Memory: Poor recent  Insight and Judgment: No  Insight and 
Problem: Psychosis  Goal: Will report no hallucinations or delusions  Description: INTERVENTIONS:  1. Administer medication as  ordered  2. Assist with reality testing to support increasing orientation  3. Assess if patient's hallucinations or delusions are encouraging self harm or harm to others and intervene as appropriate  Outcome: Progressing     Problem: Behavior  Goal: Pt/Family maintain appropriate behavior and adhere to behavioral management agreement, if implemented  Description: INTERVENTIONS:  1. Assess patient/family's coping skills and  non-compliant behavior (including use of illegal substances)  2. Notify security of behavior or suspected illegal substances which indicate the need for search of the family and/or belongings  3. Encourage verbalization of thoughts and concerns in a socially appropriate manner  4. Utilize positive, consistent limit setting strategies supporting safety of patient, staff and others  5. Encourage participation in the decision making process about the behavioral management agreement  6. If a visitor's behavior poses a threat to safety call refer to organization policy.  7. Initiate consult with , Psychosocial CNS, Spiritual Care as appropriate  2/18/2025 2229 by Alia Mitchell LPN  Outcome: Progressing    Problem: Anxiety  Goal: Will report anxiety at manageable levels  Description: INTERVENTIONS:  1. Administer medication as ordered  2. Teach and rehearse alternative coping skills  3. Provide emotional support with 1:1 interaction with staff  2/18/2025 2229 by Alia Mitchell LPN  Outcome: Progressing  Patient denies thoughts to harm self/others. Patient denies auditory and visual hallucinations. Patient appears anxious, preoccupied and suspicious. Patient is out in day area but isolative to self. Not interactive with peers. Patient reports no change in appetite and \"sleeping well here\". Patient is cooperative and compliant with medication. 1:1 emotional 
patient's hallucinations or delusions are encouraging self harm or harm to others and intervene as appropriate  2/17/2025 2215 by Luis Arnold RN  Outcome: Progressing  Note: Patient appears to be responding to internal stimuli despite denying any hallucinations. Patient appears paranoid and preoccupied. Patient is guarded with peers and staff. Patient has blocked thought pattern. Q 15 minute checks done on pt for safety      Problem: Pain  Goal: Verbalizes/displays adequate comfort level or baseline comfort level  Outcome: Progressing  Flowsheets (Taken 2/17/2025 2215)  Verbalizes/displays adequate comfort level or baseline comfort level:   Encourage patient to monitor pain and request assistance   Assess pain using appropriate pain scale   Administer analgesics based on type and severity of pain and evaluate response  Note: Patient denies any pain currently. Patient encouraged to inform staff if he develops any pain. Patient voiced understanding.  Q 15 minute checks done on pt for safety

## 2025-02-21 NOTE — PROGRESS NOTES
[facility] Internal Medicine  Moisés Zuluaga MD; Cuhn Robbins MD, Evangelist Saldana MD, Jagruti Leahy MD, Dr Kael Warner MD; Chaparro Banks MD    [facility] Internal Medicine   IN-PATIENT SERVICE   [facility]    Progress note            Date:   2/18/2025  Patient name:  Lisy Colin  Date of admission:  2/14/2025 11:39 AM  MRN:   [id number]  Account:  [id number]  YOB: 2000  PCP:    No primary care provider on file.  Room:   [address] [place] [address]  Code Status:    Full Code      Chief Complaint:     Suicidal /Ac Psychosis    History Obtained From:     Patient/EMR/bedside RN     History of Present Illness:     25-year-old female with past medical history as mentioned below, admitted for the management of acute psychosis.    Past Medical History:     Past Medical History:   Diagnosis Date    ADHD (attention deficit hyperactivity disorder)     Anxiety     Depression     Schizo-affective schizophrenia (HCC)         Past Surgical History:     Past Surgical History:   Procedure Laterality Date    ADENOIDECTOMY      FOOT FRACTURE SURGERY Bilateral 12/16/2021    LEFT CALCANEOUS OPEN REDUCTION INTERNAL FIXATION AND RIGHT FOOT FRACTURES OPEN REDUCTION INTERNAL FIXATION performed by Abilio Watts MD at [facility] OR    FOOT SURGERY Left 5/31/2023    Left foot removal of hardware performed by Abilio Watts MD at [facility] OR    TONSILLECTOMY  2003        Medications Prior to Admission:     Prior to Admission medications    Medication Sig Start Date End Date Taking? Authorizing Provider   melatonin 3 MG TABS tablet Take 1 tablet by mouth nightly 1/20/25   James Pena MD   nicotine polacrilex (COMMIT) 2 MG lozenge Take 1 lozenge by mouth every hour as needed for Smoking cessation 1/20/25   James Pena MD   QUEtiapine (SEROQUEL) 200 MG tablet Take 1 tablet by mouth nightly 1/20/25   James Pena MD   risperiDONE ER (UZEDY) 200 MG/0.56ML RADHA 
    [facility] Internal Medicine  Moisés Zuluaga MD; Chun Robbins MD, Evangelist Saldana MD, Jagruti Leahy MD, Dr Kael Warner MD; Chaparro Banks MD    [facility] Internal Medicine   IN-PATIENT SERVICE   [facility]    Progress note            Date:   2/19/2025  Patient name:  Lisy Colin  Date of admission:  2/14/2025 11:39 AM  MRN:   [id number]  Account:  [id number]  YOB: 2000  PCP:    No primary care provider on file.  Room:   [address] [place] [address]  Code Status:    Full Code      Chief Complaint:     Suicidal /Ac Psychosis    History Obtained From:     Patient/EMR/bedside RN     History of Present Illness:     25-year-old female with past medical history as mentioned below, admitted for the management of acute psychosis.    Past Medical History:     Past Medical History:   Diagnosis Date    ADHD (attention deficit hyperactivity disorder)     Anxiety     Depression     Schizo-affective schizophrenia (HCC)         Past Surgical History:     Past Surgical History:   Procedure Laterality Date    ADENOIDECTOMY      FOOT FRACTURE SURGERY Bilateral 12/16/2021    LEFT CALCANEOUS OPEN REDUCTION INTERNAL FIXATION AND RIGHT FOOT FRACTURES OPEN REDUCTION INTERNAL FIXATION performed by Abilio Watts MD at [facility] OR    FOOT SURGERY Left 5/31/2023    Left foot removal of hardware performed by Abilio Watts MD at [facility] OR    TONSILLECTOMY  2003        Medications Prior to Admission:     Prior to Admission medications    Medication Sig Start Date End Date Taking? Authorizing Provider   melatonin 3 MG TABS tablet Take 1 tablet by mouth nightly 1/20/25   James Pena MD   nicotine polacrilex (COMMIT) 2 MG lozenge Take 1 lozenge by mouth every hour as needed for Smoking cessation 1/20/25   James Pena MD   QUEtiapine (SEROQUEL) 200 MG tablet Take 1 tablet by mouth nightly 1/20/25   James Pena MD   risperiDONE ER (UZEDY) 200 MG/0.56ML RADHA 
   02/18/25 1514   Encounter Summary   Encounter Overview/Reason Behavioral Health   Service Provided For Patient   Last Encounter  02/18/25   Behavioral Health    Type  Spirituality Group   Assessment/Intervention/Outcome   Assessment Impaired resilience;Impaired social interaction;Passive   Intervention Explored Coping Skills/Resources;Prayer (assurance of)/[place];Sustaining Presence/Ministry of presence   Outcome Receptive       
  Daily Progress Note  2/16/2025    Patient Name: Lisy Colin    CHIEF COMPLAINT: Acute psychosis         SUBJECTIVE:    Patient is seen today for a follow up assessment.  He is found lying in bed.  She agrees to conduct interview in private room door open.  She continues flat with thought blocking.  She has limited speech output.  She describes mood is \"okay\".  She is endorsing low mood and anxiety.  She endorses auditory and visual hallucinations.  Does not present as preoccupied by internal stimuli.  She denies suicidal or homicidal ideation.  She reports adequate sleep overnight.  She is compliant with taking risperidone 1 mg twice daily.  No adverse effects reported.  She has been without behavioral disturbances or use of emergency medications on the unit.  She is reported as isolative.  She was offered group therapy but declined participation this morning.  Medication management per attending.      Appetite:  [x] Adequate/Unchanged  [] Increased  [] Decreased      Sleep:       [x] Adequate/Unchanged  [] Fair  [] Poor      Group Attendance on Unit:   [] Yes   [] Selectively    [x] No    Compliant with scheduled medications: [x] Yes  [] No    Received emergency medications in past 24 hrs: [] Yes   [x] No    Medication Side Effects: Denies         Mental Status Exam  Level of consciousness: Alert and awake   Appearance: Appropriate attire for setting, resting in bed, with fair  grooming and hygiene   Behavior/Motor: Approachable, engages with interviewer, no psychomotor abnormalities   Attitude toward examiner: Cooperative, attentive, fixed stare   Speech: Low volume, blunted, limited output  Mood: \"Okay\"  Affect: Flat  Thought processes: Thought blocking  Thought content:  Denies homicidal ideation  Suicidal Ideation: Denies suicidal ideations, contracts for safety on the unit.   Delusions: Patient presents with paranoia  Perceptual Disturbance: Endorses auditory and visual hallucinations.  Patient does not 
  Daily Progress Note  2/17/2025    Patient Name: Lisy Colin    CHIEF COMPLAINT: Acute psychosis         SUBJECTIVE:    Patient is seen today for a follow up assessment.  Patient was seen in the common room.  She has been walking around in the room.  Patient smiled at the writer and asked about discharge.  Patient still has significant thought blocking and affect is constricted but there is definite mild improvement since yesterday.  Patient has been responding to the medication plan to continue Risperdal to 2 mg p.o. twice daily.  Patient is discharge focused and got little upset to know that she is not going home. She is also aware that Dr. Pena will be seeing her tomorrow and decide when she is ready      Appetite:  [x] Adequate/Unchanged  [] Increased  [] Decreased      Sleep:       [x] Adequate/Unchanged  [] Fair  [] Poor      Group Attendance on Unit:   [] Yes   [] Selectively    [x] No    Compliant with scheduled medications: [x] Yes  [] No    Received emergency medications in past 24 hrs: [] Yes   [x] No    Medication Side Effects: Denies         Mental Status Exam  Level of consciousness: Alert and awake   Appearance: Appropriate attire for setting, resting in bed, with fair  grooming and hygiene   Behavior/Motor: Approachable, engages with interviewer, no psychomotor abnormalities   Attitude toward examiner: Cooperative, attentive, fixed stare   Speech: Low volume, blunted, limited output  Mood: \"Okay\"  Affect: Flat  Thought processes: Thought blocking  Thought content:  Denies homicidal ideation  Suicidal Ideation: Denies suicidal ideations, contracts for safety on the unit.   Delusions: Patient presents with paranoia  Perceptual Disturbance: Endorses auditory and visual hallucinations.  Patient does not appear to be responding to internal stimuli.   Cognition: Oriented to self, location, time, and situation  Memory: intact  Insight: poor   Judgement: poor       Data   height is 1.575 m (5' 2\") and 
  Daily Progress Note  2/18/2025    Patient Name: Lisy Colin    CHIEF COMPLAINT: Schizoaffective disorder         SUBJECTIVE:    Patient is seen today for a follow up assessment. She is found lying in bed awake.  Agrees to conduct interview in private room with door open.  She is constricted and continues with significant thought blocking.  Speech is blunted with limited output. She describes mood as \"okay\". She endorses improvement of depression.  Denies anxiety.  She denies suicidal or homicidal ideation.  She endorses quieting of auditory hallucinations, states \"they are almost gone\".  She endorses some paranoia. She  continues withdrawn, attended group this afternoon.  She is compliant with taking Risperdal 2 mg twice daily.  Denies adverse effects.  She has been without behavioral disturbances or use of emergency medications on the unit.  Continues to utilize hydroxyzine and trazodone as needed.    Appetite:  [x] Adequate/Unchanged  [] Increased  [] Decreased      Sleep:       [x] Adequate/Unchanged  [] Fair  [] Poor      Group Attendance on Unit:   [] Yes   [x] Selectively    [] No    Compliant with scheduled medications: [x] Yes  [] No    Received emergency medications in past 24 hrs: [] Yes   [x] No    Medication Side Effects: Denies         Mental Status Exam  Level of consciousness: Alert and awake   Appearance: Appropriate attire for setting, seated on bed, with fair  grooming and hygiene   Behavior/Motor: Approachable, engages with interviewer, no psychomotor abnormalities   Attitude toward examiner: Cooperative, attentive, fixed eye contact  Speech: Blunted, limited output, normal volume  Mood: \"Okay\"  Affect: Constricted  Thought processes: thought blocking   Thought content:  Denies homicidal ideation  Suicidal Ideation: Denies suicidal ideations, contracts for safety on the unit.   Delusions: Patient presents with paranoia.  Perceptual Disturbance: Endorses quieting of auditory hallucinations.  
CLINICAL PHARMACY NOTE: MEDS TO BEDS    Total # of Prescriptions Filled: 3   The following medications were delivered to the patient:  Nicotine 2MG Lozenges   Hydroxyzine HCL 50MG Tablets   Risperidone 2MG Tablets     Additional Documentation:  Delivered to [facility] and signed for by Abigail at 3:08PM 2/20/25  
Daily Progress Note  James Pena MD  2/19/2025  CHIEF COMPLAINT: Psychosis    Reviewed patient's current plan of care and vital signs with nursing staff.  Sleep:  several hours last night  Attending groups: Yes    SUBJECTIVE:    Patient indicated she did not want to talk to this provider.  She did deny side effects to medication.  She has minimal insight as to her need to be here or the events leading up to her hospitalization.  No emergency medications have been required.  Denies problems with appetite or sleep.  Reports her mood is fine.  Denies auditory or visual hallucinations.    Mental Status Exam  Level of consciousness:  Within normal limits  Appearance: Hospital attire, seated in chair, with good grooming and hygiene   Behavior/Motor: No abnormalities noted  Attitude toward examiner:  Cooperative, attentive, good eye contact  Speech:  spontaneous, normal rate, normal volume and well articulated  Mood: \"Fine\"  Affect: Somewhat withdrawn  Thought processes:  linear, goal directed and coherent  Thought content:  denies homicidal ideation  Suicidal Ideation: Denies suicidal ideation  Delusions:  no evidence of delusions  Perceptual Disturbance:  denies any perceptual disturbance  Cognition:  Oriented to self, location, time, and situation  Memory: age appropriate  Insight & Judgement: Limited but approaching baseline  Medication side effects:  denies       Data   height is 1.575 m (5' 2\") and weight is 63.5 kg (140 lb). Her temporal temperature is 97.2 °F (36.2 °C). Her blood pressure is 108/54 (abnormal) and her pulse is 108 (abnormal). Her respiration is 14 and oxygen saturation is 99%.   Labs:   Admission on 02/14/2025   Component Date Value Ref Range Status    WBC 02/14/2025 10.0  3.5 - 11.0 k/uL Final    RBC 02/14/2025 4.48  4.0 - 5.2 m/uL Final    Hemoglobin 02/14/2025 14.2  12.0 - 16.0 g/dL Final    Hematocrit 02/14/2025 41.6  36 - 46 % Final    MCV 02/14/2025 92.9  80 - 100 fL Final    MCH 02/14/2025 
Daily Progress Note  James Pena MD  2/20/2025  CHIEF COMPLAINT: Psychosis    Reviewed patient's current plan of care and vital signs with nursing staff.  Sleep:  several hours last night  Attending groups: Yes    SUBJECTIVE:    Patient reports she is tolerating medication well.  Reports good mood.  Denying auditory visual hallucinations.  Denying suicidal or homicidal ideation intent or plan.  Reports good sleep and good appetite..  No emergency medications have been required.  Spent time in supportive psychotherapy, attempting to draw connection to substance use and hospitalizations.    Mental Status Exam  Level of consciousness:  Within normal limits  Appearance: Hospital attire, seated in chair, with good grooming and hygiene   Behavior/Motor: No abnormalities noted  Attitude toward examiner:  Cooperative, attentive, good eye contact  Speech:  spontaneous, normal rate, normal volume and well articulated  Mood: \"Good\"  Affect: Fair  Thought processes:  linear, goal directed and coherent  Thought content:  denies homicidal ideation  Suicidal Ideation: Denies suicidal ideation  Delusions:  no evidence of delusions  Perceptual Disturbance:  denies any perceptual disturbance  Cognition:  Oriented to self, location, time, and situation  Memory: age appropriate  Insight & Judgement: Limited but approaching baseline  Medication side effects:  denies       Data   height is 1.575 m (5' 2\") and weight is 63.5 kg (140 lb). Her temporal temperature is 97 °F (36.1 °C). Her blood pressure is 142/101 (abnormal) and her pulse is 125 (abnormal). Her respiration is 16 and oxygen saturation is 100%.   Labs:   Admission on 02/14/2025   Component Date Value Ref Range Status    WBC 02/14/2025 10.0  3.5 - 11.0 k/uL Final    RBC 02/14/2025 4.48  4.0 - 5.2 m/uL Final    Hemoglobin 02/14/2025 14.2  12.0 - 16.0 g/dL Final    Hematocrit 02/14/2025 41.6  36 - 46 % Final    MCV 02/14/2025 92.9  80 - 100 fL Final    MCH 02/14/2025 31.8  26 
Pharmacy Medication History Note      List of current medications patient is taking is complete.    Patient was discharged from [facility] on 1/19/25.  No change to medication list from S. OARRS negative. Uzedy was given on 1/19/25 during admission.     Current Home Medication List at Time of Admission:  Prior to Admission medications    Medication Sig   melatonin 3 MG TABS tablet Take 1 tablet by mouth nightly   nicotine polacrilex (COMMIT) 2 MG lozenge Take 1 lozenge by mouth every hour as needed for Smoking cessation   QUEtiapine (SEROQUEL) 200 MG tablet Take 1 tablet by mouth nightly   risperiDONE ER (UZEDY) 200 MG/0.56ML RADHA subcutaneous injection Inject 0.56 mLs into the skin every 2 months  Last given on 1/19/25       Please let me know if you have any questions about this encounter. Thank you!    Electronically signed by Shara Sousa RPH on 2/14/2025 at 1:14 PM     
RT ASSESSMENT TREATMENT GOALS    [x]Pt Goal:  Pt will identify 1-2 positive coping skills by time of discharge.    []Pt Goal:  Pt will identify 1-2 positive aspects of self by time of discharge.    []Pt Goal:  Pt will remain on task/topic for 15-30 minutes during group by time of discharge.    [x]Pt Goal:  Pt will identify 1-2 aspects of relapse prevention plan by time of discharge.    []Pt Goal:  Pt will join in conversation with peers 1-2 times per group by time of discharge.    []Pt Goal:  Pt will identify 1-2 new leisure interests by time of discharge.    [x]Pt Goal:  Pt will not voice any delusional content by time of discharge.    
distress  Mental status: oriented to person, place, and time   Head:  normocephalic, atraumatic.  Neck: supple, no carotid bruits, thyroid not palpable  Lungs: Bilateral equal air entry, clear to ausculation, no wheezing, rales or rhonchi, normal effort  Cardiovascular: normal rate, regular rhythm, no murmur, gallop, rub.  Abdomen: Soft, nontender, nondistended, normal bowel sounds, no hepatomegaly or splenomegaly  Neurologic: CN II-XII intact , DTR normal, no new focal motor or sensory deficits, moving all extremities spontaneously.   Skin: No gross lesions, rashes, bruising or bleeding on exposed skin area  Extremities:  peripheral pulses palpable, no pedal edema or calf pain with palpation    Investigations:      Laboratory Testing:  No results found for this or any previous visit (from the past 24 hour(s)).    Imaging/Diagonstics:  No results found.    Assessment :      Hospital Problems             Last Modified POA    * (Principal) Schizoaffective disorder (HCC) 2/15/2025 Yes    Generalized anxiety disorder 2/15/2025 Yes    Acute psychosis ([facility]) 2/15/2025 Yes    Hypokalemia 2/15/2025 Yes    Polysubstance use disorder 2/15/2025 Yes       Plan:     Admitted to inpatient psych with acute psychosis.  Hypokalemia.  Replace, repeat potassium check.  Cannabinoid abuse.  Advised cessation.  Labs and medications reviewed.     DVT prophylaxis,  Pt mobile   Full code status    2/18.  Patient seen and examined.  Hypokalemia, replaced.  Repeat potassium check, patient refusing labs.  Labs, medication and vitals reviewed.    2/19.  Patient seen and examined.  Refusing labs.  His heart rate is slightly on the higher side.  Denies chest pain.  Palpitation.  Will get EKG, TSH.    2/20.  Patient seen and examined.  Heart rate on the higher side, denies chest pain, palpitations.  Refusing any kind of labs to be done patient states that she could see no and does not want to do any testing as she is fine.  Labs, medication and

## 2025-02-21 NOTE — BH NOTE
Patient given tobacco quitline number 49856480444 at this time, refusing to call at this time, states \" I just dont want to quit now\"- patient given information as to the dangers of long term tobacco use. Continue to reinforce the importance of tobacco cessation.

## 2025-02-21 NOTE — DISCHARGE INSTRUCTIONS
Information:  Medications:   Medication summary provided   I understand that I should take only the medications on my list.     -why and when I need to take each medicine.     -which side effects to watch for.     -that I should carry my medication information at all times in case of     Emergency situations.    I will take all of my medicines to follow up appointments.     -check with my physician or pharmacist before taking any new    Medication, over the counter product or drink alcohol.    -Ask about food, drug or dietary supplement interactions.    -discard old lists and update records with medication providers.    Notify Physician:  Notify physician if you notice:   Always call 911 if you feel your life is in danger  In case of an emergency call 911 immediately!  If 911 is not available call your local emergency medical system for help    Behavioral Health Follow Up:  Original Referral Source:ED  Discharge Diagnosis: Acute psychosis (HCC) [F23]  Schizoaffective disorder, unspecified type (HCC) [F25.9]  Recommendations for Level of Care: continue medications, attend follow up   Patient status at discharge: stable   My hospital  was: [facility]   Aftercare plan faxed: yes   -faxed by: staff   -date: 2/21/25   -time: 1700  Prescriptions: medications will go home with you today.     Smoking: Quit Smoking.   Call the [employer]'s smoking quitline at 7-458-14N-QUIT  Know the signs of a heart attack   If you have any of the following symptoms call 911 immediately, do not wait more    Than five minutes.    1. Pressure, fullness and/ or squeezing in the center of the chest spreading to    The jaw, neck or shoulder.    2. Chest discomfort with light headedness, fainting, sweating, nausea or    Shortness of breath.   3. Upper abdominal pressure or discomfort.   4. Lower chest pain, back pain, unusual fatigue, shortness of breath, nausea   Or dizziness.     General Information:   Questions regarding your bill: Call

## 2025-02-21 NOTE — CARE COORDINATION
Name: Lisy Colin    : 2000    Auth number: [id number]     Discharge Date: 25    Destination: home     Discharge Medications:      Medication List        START taking these medications      hydrOXYzine HCl 50 MG tablet  Commonly known as: ATARAX  Take 1 tablet by mouth 3 times daily as needed for Anxiety  Notes to patient: Anxiety             CHANGE how you take these medications      nicotine polacrilex 2 MG lozenge  Commonly known as: COMMIT  Take 1 lozenge by mouth every 2 hours as needed for Smoking cessation  What changed: when to take this  Notes to patient: Stop smoking      * risperiDONE 2 MG tablet  Commonly known as: RISPERDAL  Take 1 tablet by mouth 2 times daily  What changed: You were already taking a medication with the same name, and this prescription was added. Make sure you understand how and when to take each.  Notes to patient: Mood/thoughts      * risperiDONE  MG/0.56ML Corrie subcutaneous injection  Commonly known as: UZEDY  Inject 0.56 mLs into the skin every 2 months  Start taking on: 2025  What changed: Another medication with the same name was added. Make sure you understand how and when to take each.  Notes to patient: Long acting shot            * This list has 2 medication(s) that are the same as other medications prescribed for you. Read the directions carefully, and ask your doctor or other care provider to review them with you.                STOP taking these medications      melatonin 3 MG Tabs tablet     QUEtiapine 200 MG tablet  Commonly known as: SEROQUEL               Where to Get Your Medications        These medications were sent to [id number] - [address] -  [phone] - F [phone]  [address] [place], [place] [address]      Phone: [phone]   hydrOXYzine HCl 50 MG tablet  nicotine polacrilex 2 MG lozenge  risperiDONE 2 MG tablet     Pharmacy Instructions:    Medications will go home with you

## 2025-02-21 NOTE — TRANSITION OF CARE
Behavioral Health Transition Record    Patient Name: Lisy Colin  YOB: 2000   Medical Record Number: [id number]  Date of Admission: 2/14/2025 11:39 AM   Date of Discharge: 2/21/25    Attending Provider: James Pena MD   Discharging Provider: Jean   To contact this individual call [phone] and ask the  to page.  If unavailable, ask to be transferred to Behavioral Health Provider on call.  A Behavioral Health Provider will be available on call 24/7 and during holidays.    Primary Care Provider: No primary care provider on file.    No Known Allergies    Reason for Admission: Command hallucinations directing harm to self or others; risk of the patient taking action  Acute disordered/bizarre behavior or psychomotor agitation or retardation;interferes with ADLs so that patient cannot function at a less intensive care level of care during evaluation and treatment   A mental disorder causing major disability in social, interpersonal, occupational, and/or educational functioning that is leading to dangerous or life-threatening functioning, and that can only be addressed in an acute inpatient setting   Failure of outpatient psychiatry treatment so that the beneficiary requires 24 hour professional observation and care  Concerns about patient's safety in the community  Past Mental Health Diagnosis: a history of  Schizoaffective Disorder, Prior suicide attempt, and Alcohol and/or Drug Use Disorder  Triggering event or precipitating factor: Alcohol/Drug Relapse and Psych Treatment Noncompliance  Length of time/duration of triggering event: Days  Legal Status: Voluntary    Admission Diagnosis: Acute psychosis (HCC) [F23]  Schizoaffective disorder, unspecified type (HCC) [F25.9]    * No surgery found *    Results for orders placed or performed during the hospital encounter of 02/14/25   CBC with Auto Differential   Result Value Ref Range    WBC 10.0 3.5 - 11.0 k/uL    RBC 4.48 4.0 - 5.2 m/uL

## 2025-02-21 NOTE — CARE COORDINATION
Discharge Arrangements:  [free text here if needed/wanted]    Guardian notified: n/a    Discharge destination/address: [address] [facility] [address]    Transported by:  [facility]     Follow up appointment is scheduled for DENISSE 2/24/2025 at 930a.  She was not accepting of a substance abuse referral.      *[place] resources were offered to patient throughout admission and at time of discharge. This list of [facility] [place] providers was provided to patient:     [facility]  [address] [facility] [address]   [address] [facility] [address]  Phone: [phone]     Phone: [phone]    [facility]  [place]   [address] [facility] [address]   [address] [facility] [address]  Phone: [phone]     Phone: [phone]    [facility]    [facility]  [address] [facility] [address]    [address] [facility] [address]  Phone: [phone]     Phone: [phone]    [facility]     [facility]   [address] [facility] [address]  [place] [address]      Phone: [phone]  Phone: [phone]        [facility]  [address] [facility] [address]   [address] [place] [address]  Phone: [phone]     Phone:  [phone]    [employer]      [facility]  [address] [facility] [address]   [address] [facility] [address]  Phone: [phone]     Phone: [phone]    [facility]  [address] [address]   [address] [facility] [address]  Phone: [phone]     Phone: [phone]    [facility]  [facility]  [address] [facility] [address] [address] [facility] [address]  Phone: [phone]     Phone: [phone]    [facility]     [facility]  [address] [place] [address] Dr. [place]

## 2025-02-21 NOTE — BH NOTE
Behavioral Health [place]  Discharge Note    Pt discharged with followings belongings:   Dental Appliances: None  Vision - Corrective Lenses: None  Hearing Aid: None  Jewelry: None  Body Piercings Removed: N/A  Clothing: Socks, Pants, Footwear, Sweater, Jacket/Coat  Other Valuables: Keys   Valuables sent home with or returned to patient. Patient educated on aftercare instructions: yes  Information faxed to follow up by staff  at 2:37 PM .Patient verbalize understanding of AVS:  yes.    Status EXAM upon discharge:  Mental Status and Behavioral Exam  Normal: No (anxious)  Level of Assistance: Independent/Self  Facial Expression: Brightened  Affect: Appropriate  Level of Consciousness: Alert  Frequency of Checks: 4 times per hour, close  Mood:Normal: Yes  Mood: Anxious  Motor Activity:Normal: Yes  Motor Activity: Decreased  Eye Contact: Good  Observed Behavior: Withdrawn  Sexual Misconduct History: Current - no  Preception: [place] to person, [place] to time, [place] to place, [place] to situation  Attention:Normal: No  Attention: Distractible  Thought Processes: Blocking  Thought Content:Normal: No  Thought Content: Preoccupations  Depression Symptoms: No problems reported or observed.  Anxiety Symptoms: Generalized  Linda Symptoms: No problems reported or observed.  Hallucinations: None  Delusions: No  Delusions: Paranoid  Memory:Normal: Yes  Memory: Poor recent  Insight and Judgment: No  Insight and Judgment: Poor judgment, Poor insight    Tobacco Screening:  Practical Counseling, on admission, jonatan X, if applicable and completed (first 3 are required if patient doesn't refuse):            ( ) Recognizing danger situations (included triggers and roadblocks)                    ( ) Coping skills (new ways to manage stress,relaxation techniques, changing routine, distraction)                                                           ( ) Basic information about quitting (benefits of quitting, techniques in how to quit,

## 2025-02-28 ENCOUNTER — HOSPITAL ENCOUNTER (EMERGENCY)
Age: 25
Discharge: HOME | End: 2025-02-28
Attending: EMERGENCY MEDICINE
Payer: COMMERCIAL

## 2025-02-28 VITALS
SYSTOLIC BLOOD PRESSURE: 135 MMHG | TEMPERATURE: 97.3 F | OXYGEN SATURATION: 97 % | HEART RATE: 76 BPM | RESPIRATION RATE: 18 BRPM | DIASTOLIC BLOOD PRESSURE: 83 MMHG

## 2025-02-28 DIAGNOSIS — Z76.0 ENCOUNTER FOR MEDICATION REFILL: ICD-10-CM

## 2025-02-28 DIAGNOSIS — T73.0XXA HUNGRY, INITIAL ENCOUNTER: ICD-10-CM

## 2025-02-28 DIAGNOSIS — R45.851 VERBALIZES SUICIDAL THOUGHTS: Primary | ICD-10-CM

## 2025-02-28 PROCEDURE — 99282 EMERGENCY DEPT VISIT SF MDM: CPT

## 2025-03-01 NOTE — ED NOTES
Provisional Diagnosis:     Schizophrenia, Suicidal Thoughts, Auditory Hallucinations    Psychosocial and Contextual Factors:     Patient has history of substance abuse.    C-SSRS Summary:    Patient is wavering in her statements regarding suicidal thoughts.  She initially states she does not want to die and does not want to kill herself, but then will alter her statements saying she is suicidal and is having thoughts to hang herself.    Patient: X  Family:   Agency:     Substance Abuse  Patient only admits to THC use, but does have history of fentanyl use.    Present Suicidal Behavior:    Patient is wavering in her statements regarding suicidal thoughts.  She initially states she does not want to die and does not want to kill herself, but then will alter her statements saying she is suicidal and is having thoughts to hang herself.    Verbal: X    Attempt:    Past Suicidal Behavior:   Patient does report a history of attempting to end her life by hanging herself.    Verbal: X    Attempt: X    Self-Injurious/Self-Mutilation:  Patient reports \"thoughts of self harm\" but does not engage in any self mutilation.    Violence Current or Past   None documented or identified    Trauma Identified:    None reported to this writer    Protective Factors:    Patient has housing.  Patient has insurance.  Patient linked with [employer].  Patient reports she is taking her mental health medications as prescribed.    Risk Factors:    Patient does have history of substance abuse.  Patient has poor insight.  Patient has poor coping skills.  Patient has poor judgement.    Clinical Summary:    Patient is a 25 year old  female who presented to ED via boyfriend for a mental health evaluation.  Patient report she is \"having a Schizophrenia outbreak\".  Patient does initially indicate to triage nurse that she is suicidal, but reports to this writer that she does not want to die and does not want to kill herself.  Patient was questioned on

## 2025-03-01 NOTE — ED NOTES
SW assisted pt creating a safety plan. SW discussed with pt warning signs, coping skills, and people/agencies who pt can reach out for help when feeling suicidal. SW made pt aware of ways to make pt's environment safer in times of crisis. Pt provided with the National Suicide Prevention Line: 1-130.235.4951 or the text number 009780 and strongly encouraged to utilize this resource if needed.

## 2025-03-01 NOTE — ED TRIAGE NOTES
Pt states she is having suicidal thoughts and would like to be put on different medication. Pt is currently taking psych meds and states she does like them. Pt  does not have a plan. Pt states she did smoke weed today but denies any other drug/alcohol use. Pt states boyfriend dropped her off.
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
## 2025-03-02 ENCOUNTER — HOSPITAL ENCOUNTER (EMERGENCY)
Age: 25
Discharge: LEFT BEFORE DISCHARGE | End: 2025-03-02
Attending: EMERGENCY MEDICINE

## 2025-03-02 DIAGNOSIS — Z53.21 PATIENT LEFT WITHOUT BEING SEEN: Primary | ICD-10-CM

## 2025-06-18 ENCOUNTER — HOSPITAL ENCOUNTER (EMERGENCY)
Age: 25
Discharge: HOME | End: 2025-06-18
Attending: EMERGENCY MEDICINE
Payer: COMMERCIAL

## 2025-06-18 VITALS
RESPIRATION RATE: 19 BRPM | SYSTOLIC BLOOD PRESSURE: 114 MMHG | BODY MASS INDEX: 25.61 KG/M2 | OXYGEN SATURATION: 97 % | HEART RATE: 105 BPM | TEMPERATURE: 98.4 F | WEIGHT: 140 LBS | DIASTOLIC BLOOD PRESSURE: 75 MMHG

## 2025-06-18 DIAGNOSIS — F25.9 SCHIZOAFFECTIVE DISORDER, UNSPECIFIED TYPE (HCC): Primary | ICD-10-CM

## 2025-06-18 PROCEDURE — 99282 EMERGENCY DEPT VISIT SF MDM: CPT

## 2025-06-18 ASSESSMENT — PAIN - FUNCTIONAL ASSESSMENT: PAIN_FUNCTIONAL_ASSESSMENT: NONE - DENIES PAIN

## 2025-06-18 NOTE — ED PROVIDER NOTES
External ear normal.      Left Ear: External ear normal.      Nose: Nose normal. No congestion.      Mouth/Throat:      Mouth: Mucous membranes are moist.      Pharynx: Oropharynx is clear.   Eyes:      General:         Right eye: No discharge.         Left eye: No discharge.      Conjunctiva/sclera: Conjunctivae normal.   Neck:      Trachea: No tracheal deviation.   Cardiovascular:      Rate and Rhythm: Tachycardia present.   Pulmonary:      Effort: Pulmonary effort is normal. No respiratory distress.      Breath sounds: No wheezing or rales.   Abdominal:      Tenderness: There is no abdominal tenderness.   Musculoskeletal:         General: Normal range of motion.      Cervical back: Normal range of motion and neck supple.   Skin:     General: Skin is dry.      Findings: No erythema or rash.   Neurological:      Mental Status: She is alert and oriented to person, place, and time.      Coordination: Coordination normal.      Comments: GCS 15.  No weakness in her arms or legs.  Ambulates with a normal gait   Psychiatric:         Mood and Affect: Mood normal.         Behavior: Behavior normal.         Thought Content: Thought content normal.         Judgment: Judgment normal.      Comments: Flat affect but calm and cooperative.  Denies SI or HI.  She is not psychotic.  She has good insight into her illness.  No disorganized thinking.  No delusions or hallucinations         MEDICAL DECISION MAKING / ED COURSE:         1)  Number and Complexity of Problems Addressed at this Encounter  Problem List This Visit: Patient is here for mental health assessment.  History of schizoaffective disorder    Differential Diagnosis: Schizoaffective disorder    Diagnoses Considered but Do Not Suspect: Not currently psychotic.  No SI or HI    Pertinent Comorbid Conditions: Schizoaffective disorder    2)  Data Reviewed and Analyzed  (Lab and radiology tests/orders below in next section)    External Documents Reviewed: Past medical records

## 2025-06-18 NOTE — ED NOTES
Mode of arrival (squad #, walk in, police, etc) : walk in         Chief complaint(s): mental health problem         Arrival Note (brief scenario, treatment PTA, etc).: Pt denies being SI or HI. Pt states she wants to be seen for  mental health but will not tell the writer why.         C= \"Have you ever felt that you should Cut down on your drinking?\"  No  A= \"Have people Annoyed you by criticizing your drinking?\"  No  G= \"Have you ever felt bad or Guilty about your drinking?\"  No  E= \"Have you ever had a drink as an Eye-opener first thing in the morning to steady your nerves or to help a hangover?\"  No      Deferred []      Reason for deferring: N/A    *If yes to two or more: probable alcohol abuse.*

## 2025-06-18 NOTE — ED NOTES
Lisy Colin is a 25 y.o. female who presents at the ED due to wanting a change in her medications.     Patient denies SI/HI/AH/VH.    Patient is linked with [employer].    Patient is not answering questions properly. Patient is refusing care. Patient is requesting to leave ER. During evaluation with ED physician and SW patient stated \"I don't want to answer any questions, I want to leave.\" Patient got up and walked away from staff. Patient repeating \"I'm fine, I want to leave.\"     Patient presents with low affect and slow to respond.     Writer consulted with ED Physician, patient does not meet criteria for inpatient admission. Patient will be cabbed home. Patient agreeable to discharge plan. Patient escorted out of Emergency Room with no issues.      provided patient with brief patient prevention education interventions including follow-up outpatient treatment resources & recommendations, and lethal means counseling. Writer and patient discussed safety planning consisting of resources patient can use during or before a mental health crisis. Patient given local community information on homeless shelters, clothing sites, and hot meal sites.      The following service(s) is/are recommended for behavioral health follow-up:  Contact [facility] Crisis Care line [phone] any time for support.  Medications: Take Medications as prescribed. Let your physician know if you have any concerns.  Recovery Help line for assistance with linkage to mental health and substance use services. Call 211.  Return to Emergency Department if you have any further medical concerns.   Patient given National suicide prevention line at 1-194.133.9776

## 2025-07-16 NOTE — ADDENDUM NOTE
Health Maintenance       COVID-19 Vaccine (5 - Moderna risk 2024-25 season)  Overdue since 4/14/2025    Respiratory Syncytial Virus (RSV) Vaccine 60+ (1 - 1-dose 75+ series)  Never done    Medicare Advantage- Medicare Wellness Visit (Yearly - January to December)  Scheduled for 7/16/2025           Following review of the above:  Patient is not proceeding with: COVID-19 and Respiratory Syncytial Virus (RSV)    Note: Refer to final orders and clinician documentation.       Review Flowsheet  More data exists         7/16/2025   PHQ 2/9 Score   Adult PHQ 2 Score 0   Adult PHQ 2 Interpretation No further screening needed   Little interest or pleasure in activity? Not at all   Feeling down, depressed or hopeless? Not at all       Addended by: Fanny Verdin on: 1/10/2022 03:23 PM     Modules accepted: Orders

## (undated) DEVICE — 3M™ STERI-DRAPE™ INCISE DRAPE 1050 (60CM X 45CM): Brand: STERI-DRAPE™

## (undated) DEVICE — DRAPE,T,LIMB,BILATERAL,STERILE: Brand: MEDLINE

## (undated) DEVICE — SPLINT ORTH W5XL30IN PLSTR OF PARIS LO EXOTHERM SMOOTH

## (undated) DEVICE — STRIP WND CLSR W1 4XL4IN POLYAMIDE MACROPOROUS NONWOVEN H2O

## (undated) DEVICE — PADDING CAST W6INXL4YD COT LO LINTING WYTEX

## (undated) DEVICE — GLOVE SURG SZ 8 L12IN FNGR THK75MIL WHT LTX POLYMER BEAD

## (undated) DEVICE — CANNULATED DRILL: Brand: FIXOS

## (undated) DEVICE — Device

## (undated) DEVICE — SUTURE VCRL + SZ 2-0 L27IN ABSRB UD CT-2 L26MM 1/2 CIR TAPR VCP269H

## (undated) DEVICE — BANDAGE COMPR W4INXL5YD WHT BGE POLY COT M E WRP WV HK AND

## (undated) DEVICE — PADDING,UNDERCAST,COTTON, 4"X4YD STERILE: Brand: MEDLINE

## (undated) DEVICE — MERCY HEALTH ST CHARLES: Brand: MEDLINE INDUSTRIES, INC.

## (undated) DEVICE — GAUZE,SPONGE,FLUFF,6"X6.75",STRL,5/TRAY: Brand: MEDLINE

## (undated) DEVICE — INTENDED FOR TISSUE SEPARATION, AND OTHER PROCEDURES THAT REQUIRE A SHARP SURGICAL BLADE TO PUNCTURE OR CUT.: Brand: BARD-PARKER ® CARBON RIB-BACK BLADES

## (undated) DEVICE — C-ARM: Brand: UNBRANDED

## (undated) DEVICE — NDL CNTR 40CT FM MAG: Brand: MEDLINE INDUSTRIES, INC.

## (undated) DEVICE — PADDING CAST W2INXL4YD COT LO LINTING WYTEX

## (undated) DEVICE — ZIMMER® STERILE DISPOSABLE TOURNIQUET CUFF WITH PLC, DUAL PORT, SINGLE BLADDER, 34 IN. (86 CM)

## (undated) DEVICE — TOWEL,OR,DSP,ST,BLUE,DLX,XR,4/PK,20PK/CS: Brand: MEDLINE

## (undated) DEVICE — BLANKET WRM W29.9XL79.1IN UP BODY FORC AIR MISTRAL-AIR

## (undated) DEVICE — SUTURE VCRL + SZ 0 L27IN ABSRB WHT CT-2 1/2 CIR TAPERPOINT VCP270H

## (undated) DEVICE — SMARTGOWN SURGICAL GOWN, 3XL, LONG: Brand: CONVERTORS

## (undated) DEVICE — GARMENT,MEDLINE,DVT,INT,CALF,MED, GEN2: Brand: MEDLINE

## (undated) DEVICE — SELF-DRILLING HALF PIN: Brand: APEX

## (undated) DEVICE — BANDAGE COMPR W6INXL12FT SMOOTH FOR LIMB EXSANG ESMARCH

## (undated) DEVICE — SUTURE ETHLN SZ 3-0 L18IN NONABSORBABLE BLK PS-2 L19MM 3/8 1669H

## (undated) DEVICE — UNTHREADED GUIDE WIRE: Brand: FIXOS

## (undated) DEVICE — 3M™ STERI-DRAPE™ U-DRAPE 1015: Brand: STERI-DRAPE™

## (undated) DEVICE — SPONGE LAP W18XL18IN WHT COT 4 PLY FLD STRUNG RADPQ DISP ST

## (undated) DEVICE — DRAPE,U/ SHT,SPLIT,PLAS,STERIL: Brand: MEDLINE

## (undated) DEVICE — TOWEL,OR,DSP,ST,BLUE,STD,6/PK,12PK/CS: Brand: MEDLINE

## (undated) DEVICE — COVER LT HNDL BLU PLAS

## (undated) DEVICE — DRAPE,REIN 53X77,STERILE: Brand: MEDLINE

## (undated) DEVICE — DRESSING PETRO W3XL8IN OIL EMUL N ADH GZ KNIT IMPREG CELOS

## (undated) DEVICE — BANDAGE,ELASTIC,ESMARK,STERILE,6"X9',LF: Brand: MEDLINE

## (undated) DEVICE — SOLUTION IRRIG 1000ML STRL H2O USP PLAS POUR BTL

## (undated) DEVICE — PADDING UNDERCAST W2INXL4YD COT WYTEX

## (undated) DEVICE — GLOVE SURG SZ 85 L12IN FNGR THK79MIL GRN LTX FREE

## (undated) DEVICE — GOWN,SIRUS,POLYRNF,XLN/3XL,18/CS: Brand: MEDLINE

## (undated) DEVICE — SINGLE PORT MANIFOLD: Brand: NEPTUNE 2

## (undated) DEVICE — GLOVE SURG SZ 8 CRM LTX FREE POLYISOPRENE POLYMER BEAD ANTI

## (undated) DEVICE — GOWN,SIRUS,NONRNF,SETINSLV,XL,20/CS: Brand: MEDLINE

## (undated) DEVICE — APPLICATOR MEDICATED 26 CC SOLUTION HI LT ORNG CHLORAPREP

## (undated) DEVICE — SOLUTION IRRIG 1000ML 0.9% SOD CHL USP POUR PLAS BTL

## (undated) DEVICE — CONTAINER,SPECIMEN,OR STERILE,4OZ: Brand: MEDLINE

## (undated) DEVICE — PENCIL ES L3M BTTN SWCH HOLSTER W/ BLDE ELECTRD EDGE

## (undated) DEVICE — SUTURE VCRL SZ 2-0 L27IN ABSRB UD L26MM CT-2 1/2 CIR J269H

## (undated) DEVICE — Z DISCONTINUED BY MEDLINE USE 2711682 TRAY SKIN PREP DRY W/ PREM GLV

## (undated) DEVICE — TUBING, SUCTION, 3/16" X 10', STRAIGHT: Brand: MEDLINE

## (undated) DEVICE — SOLUTION SCRB 4OZ 4% CHG H2O AIDED FOR PREOPERATIVE SKIN

## (undated) DEVICE — BANDAGE COMPR W6INXL5YD WHT BGE POLY COT M E WRP WV HK AND

## (undated) DEVICE — SUTURE ETHLN SZ 3-0 L18IN NONABSORBABLE BLK FS-1 L24MM 3/8 663H

## (undated) DEVICE — SPONGE LAP W18XL18IN WHT COT 4 PLY FLD STRUNG RADPQ DISP ST 2 PER PACK

## (undated) DEVICE — SKIN PREP TRAY W/CHG: Brand: MEDLINE INDUSTRIES, INC.

## (undated) DEVICE — C-ARMOR C-ARM EQUIPMENT COVERS CLEAR STERILE UNIVERSAL FIT 12 PER CASE: Brand: C-ARMOR

## (undated) DEVICE — GOWN,AURORA,NONREINFORCED,LARGE: Brand: MEDLINE

## (undated) DEVICE — COVER,TABLE,60X90,STERILE: Brand: MEDLINE

## (undated) DEVICE — DRAPE,EXTREMITY,89X128,STERILE: Brand: MEDLINE

## (undated) DEVICE — CANNULATED DRILL